# Patient Record
Sex: FEMALE | Race: NATIVE HAWAIIAN OR OTHER PACIFIC ISLANDER | Employment: UNEMPLOYED | URBAN - METROPOLITAN AREA
[De-identification: names, ages, dates, MRNs, and addresses within clinical notes are randomized per-mention and may not be internally consistent; named-entity substitution may affect disease eponyms.]

---

## 2023-02-11 ENCOUNTER — TRANSFERRED RECORDS (OUTPATIENT)
Dept: HEALTH INFORMATION MANAGEMENT | Facility: CLINIC | Age: 28
End: 2023-02-11
Payer: COMMERCIAL

## 2023-03-03 ENCOUNTER — TELEPHONE (OUTPATIENT)
Dept: ADDICTION MEDICINE | Facility: CLINIC | Age: 28
End: 2023-03-03

## 2023-03-03 ENCOUNTER — HOSPITAL ENCOUNTER (OUTPATIENT)
Dept: BEHAVIORAL HEALTH | Facility: CLINIC | Age: 28
Discharge: HOME | End: 2023-03-03
Attending: FAMILY MEDICINE
Payer: COMMERCIAL

## 2023-03-03 VITALS
SYSTOLIC BLOOD PRESSURE: 127 MMHG | DIASTOLIC BLOOD PRESSURE: 90 MMHG | TEMPERATURE: 97.7 F | OXYGEN SATURATION: 98 % | HEART RATE: 113 BPM

## 2023-03-03 LAB — SARS-COV-2 RNA RESP QL NAA+PROBE: NEGATIVE

## 2023-03-03 PROCEDURE — U0005 INFEC AGEN DETEC AMPLI PROBE: HCPCS | Performed by: FAMILY MEDICINE

## 2023-03-03 PROCEDURE — H2035 A/D TX PROGRAM, PER HOUR: HCPCS

## 2023-03-03 PROCEDURE — 1002N00001 HC LODGING PLUS FACILITY CHARGE ADULT

## 2023-03-03 RX ORDER — QUETIAPINE FUMARATE 50 MG/1
100 TABLET, FILM COATED ORAL 2 TIMES DAILY
Status: ON HOLD | COMMUNITY
Start: 2023-03-02 | End: 2023-04-10

## 2023-03-03 RX ORDER — GUAIFENESIN 100 MG/5ML
SOLUTION ORAL EVERY 4 HOURS PRN
COMMUNITY
End: 2023-03-16

## 2023-03-03 RX ORDER — QUETIAPINE 300 MG/1
600 TABLET, FILM COATED, EXTENDED RELEASE ORAL
Status: ON HOLD | COMMUNITY
Start: 2023-03-02 | End: 2023-04-10

## 2023-03-03 RX ORDER — LANOLIN ALCOHOL/MO/W.PET/CERES
3 CREAM (GRAM) TOPICAL
COMMUNITY
End: 2023-03-16

## 2023-03-03 RX ORDER — ONDANSETRON 4 MG/1
4 TABLET, ORALLY DISINTEGRATING ORAL
Status: ON HOLD | COMMUNITY
Start: 2022-11-03 | End: 2023-04-10

## 2023-03-03 RX ORDER — ALBUTEROL SULFATE 90 UG/1
AEROSOL, METERED RESPIRATORY (INHALATION)
Status: ON HOLD | COMMUNITY
Start: 2022-09-12 | End: 2023-04-10

## 2023-03-03 RX ORDER — AMOXICILLIN 250 MG
2 CAPSULE ORAL DAILY PRN
COMMUNITY
End: 2023-03-16

## 2023-03-03 RX ORDER — FLUTICASONE PROPIONATE 50 MCG
SPRAY, SUSPENSION (ML) NASAL
COMMUNITY
Start: 2023-02-16

## 2023-03-03 RX ORDER — CETIRIZINE HYDROCHLORIDE 10 MG/1
10 TABLET ORAL
COMMUNITY
Start: 2022-09-16 | End: 2023-09-16

## 2023-03-03 RX ORDER — LORATADINE 10 MG/1
10 TABLET ORAL DAILY PRN
COMMUNITY
End: 2023-03-03

## 2023-03-03 RX ORDER — IBUPROFEN 200 MG
400 TABLET ORAL EVERY 6 HOURS PRN
COMMUNITY
End: 2023-03-16

## 2023-03-03 RX ORDER — ACETAMINOPHEN 325 MG/1
325-650 TABLET ORAL EVERY 4 HOURS PRN
COMMUNITY
End: 2023-03-16

## 2023-03-03 RX ORDER — MAGNESIUM HYDROXIDE/ALUMINUM HYDROXICE/SIMETHICONE 120; 1200; 1200 MG/30ML; MG/30ML; MG/30ML
30 SUSPENSION ORAL EVERY 6 HOURS PRN
COMMUNITY
End: 2023-03-16

## 2023-03-03 ASSESSMENT — COLUMBIA-SUICIDE SEVERITY RATING SCALE - C-SSRS
4. HAVE YOU HAD THESE THOUGHTS AND HAD SOME INTENTION OF ACTING ON THEM?: NO
2. HAVE YOU ACTUALLY HAD ANY THOUGHTS OF KILLING YOURSELF LIFETIME?: NO
3. HAVE YOU BEEN THINKING ABOUT HOW YOU MIGHT KILL YOURSELF?: NO
1. IN THE PAST MONTH, HAVE YOU WISHED YOU WERE DEAD OR WISHED YOU COULD GO TO SLEEP AND NOT WAKE UP?: NO
1. IN THE PAST MONTH, HAVE YOU WISHED YOU WERE DEAD OR WISHED YOU COULD GO TO SLEEP AND NOT WAKE UP?: NO
6. HAVE YOU EVER DONE ANYTHING, STARTED TO DO ANYTHING, OR PREPARED TO DO ANYTHING TO END YOUR LIFE?: NO
2. HAVE YOU ACTUALLY HAD ANY THOUGHTS OF KILLING YOURSELF IN THE PAST MONTH?: NO
5. HAVE YOU STARTED TO WORK OUT OR WORKED OUT THE DETAILS OF HOW TO KILL YOURSELF? DO YOU INTEND TO CARRY OUT THIS PLAN?: NO

## 2023-03-03 ASSESSMENT — ANXIETY QUESTIONNAIRES
7. FEELING AFRAID AS IF SOMETHING AWFUL MIGHT HAPPEN: NOT AT ALL
1. FEELING NERVOUS, ANXIOUS, OR ON EDGE: NOT AT ALL
2. NOT BEING ABLE TO STOP OR CONTROL WORRYING: NOT AT ALL
GAD7 TOTAL SCORE: 2
4. TROUBLE RELAXING: MORE THAN HALF THE DAYS
5. BEING SO RESTLESS THAT IT IS HARD TO SIT STILL: NOT AT ALL
3. WORRYING TOO MUCH ABOUT DIFFERENT THINGS: NOT AT ALL
6. BECOMING EASILY ANNOYED OR IRRITABLE: NOT AT ALL
GAD7 TOTAL SCORE: 2

## 2023-03-03 ASSESSMENT — PATIENT HEALTH QUESTIONNAIRE - PHQ9: SUM OF ALL RESPONSES TO PHQ QUESTIONS 1-9: 2
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
## 2023-03-03 NOTE — PROGRESS NOTES
Progress Note    This patient had a Comprehensive Substance Abuse assessment on 02/11/2023 completed by Rey Gerardo.  This patient was seen for a face to face update of the Comprehensive Substance Abuse assessment on 3/3/2023 by SISSY Chandler.  OUTSIDE: A paper copy of the Comprehensive Substance Abuse assessment will be placed in the patient's paper chart until being scanned into the patient's electronic medical record in Epic under the Media tab.    Alcohol/Drug use since the last CD evaluation (include date of last use):     No additional substances use since the last CD evaluation     Please note any other clinical changes since the last CD evaluation (such as medication changes, additional legal charges, detoxification admissions, overdoses, etc.)     No significant changes since the last CD evaluation- Stayed in [facility] from 2/3/23-3/3/23.        ASAM Dimensions Original scores Current Scores   I.) Intoxication and Withdrawal: 1 0   II.) Biomedical:  1 1   III.) Emotional and Behavioral:  2 2   IV.) Readiness to Change:  2 2   V.) Relapse Potential: 3 3   VI.) Recovery Environmental: 4 4     Please list clinical justifications for the above ASAM score changes since the original comprehensive assessment:     The patient's score on Dimension I changed from a 1 to a 0.  The patient's withdrawal symptoms had been addressed by her physicians while she had been on [facility]..         Current JENNIFFER: Current UA:     0.000     Positive for THC and negative for all other screened drugs.       PHQ-9, SHANTELLE-7   PHQ-9 on 3/3/2023 SHANTELLE-7 on 3/3/2023   The patient's PHQ-9 score was 2 out of 27, indicating minimal depression.   The patient's SHANTELLE-7 score was 2 out of 21, indicating minimal anxiety.       Swain-Suicide Severity Rating Scale Reassessment   Have you ever wished you were dead or that you could go to sleep and not wake up?  Past Month:  No     Have you actually had any  thoughts of killing yourself?  Past Month:  No     Have you been thinking about how you might do this?     Past Month:  No   Lifetime:  No   Have you had these thoughts and had some intention of acting on them?     Past Month:  No   Lifetime:  No   Have you started to work out the details of how to kill yourself?   Past Month:  No   Lifetime:  No   Do you intend to carry out this plan?   No     When you have the thoughts how long do they last?  The patient denied having any suicidal thoughts within the past month.     Are there things - anyone or anything (i.e. family, Muslim, pain of death) that stopped you from wanting to die or acting on thoughts of suicide?  Does not apply       2008  [employer] [facility] [employer]  Used with permission by Kim Concepcion, PhD.       Guide to C-SSRS Risk Ratings   NO IDEATION:  with no active thoughts IDEATION: with a wish to die. IDEATION: with active thoughts. Risk Ratings   If Yes No No 0 - Very Low Risk   If NA Yes No 1 - Low Risk   If NA Yes Yes 2 - Low/moderate risk   IDEATION: associated thoughts of methods without intent or plan INTENT: Intent to follow through on suicide PLAN: Plan to follow through on suicide Risk Ratings cont...   If Yes No No 3 - Moderate Risk   If Yes Yes No 4 - High Risk   If Yes Yes Yes 5 - High Risk   The patient's ADDITIONAL RISK FACTORS and lack of PROTECTIVE FACTORS may increase their overall suicide risk ratings.     Additional Risk Factors:    Significant history of having untreated or poorly treated mental health symptoms     Significant history of untreated or poorly treated chronic pain issues     Tendency to be socially isolated and/or cut off from the support of others     Significant history of trauma and/or abuse issues     History of impulsive or aggressive behaviors   Protective Factors:    An absence of chronic health problems or stable and well treated chronic health issues     Having restricted access to highly  "lethal means of suicide     Risk Status   1. - Low Risk: Evaluation Counselors:  Document in Epic / SBAR to counselor \"Low Risk\".      Treatment Counselors:  Reassess upon admission as applicable, assess weekly in progress notes under Dimension 3 and summarize in Discharge / Treatment summary under Dimension 3.     Additional information to support suicide risk rating: There was no additional information to provide at this time.       "
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
## 2023-03-03 NOTE — TELEPHONE ENCOUNTER
Pt reports anaphylactic response to dairy injestion on multiple occassions, pt has never seen anyone to diagnose a dairy allergy and reports drinking milk at the facility she just transferred from without experiencing an anaphylactic  reaction. No epi pen is listed in her med list in care everywhere, only benadryl and lactose. Writer will request dairy free menu for pt and pt will be instructed to ask PCP at upcoming appt on 3/7/23 for benadryl and epipen.

## 2023-03-03 NOTE — PROGRESS NOTES
Name: Asha Arrieta  Date: 3/3/2023  Medical Record: [id number]    Envelope Number: [id number]    List of Contents (List each item separately in new row):   (1) [employer] Cell Phone   (1) Cell Phone      Admission:  I am responsible for any personal items that are not sent to the safe or pharmacy.  [facility] is not responsible for loss, theft or damage of any property in my possession.      Patient Signature:  ___________________________________________       Date/Time:__________________________    Staff Signature: __________________________________       Date/Time:__________________________    2nd Staff person, if patient is unable/unwilling to sign:      __________________________________________________________       Date/Time: __________________________      Discharge:  [facility] has returned all of my personal belongings:    Patient Signature: ________________________________________     Date/Time: ____________________________________    Staff Signature: ______________________________________     Date/Time:_____________________________________

## 2023-03-03 NOTE — PROGRESS NOTES
Substance Use History Update:           X = Primary Drug Used   Age of First Use Most Recent Pattern of Use and Duration   Need enough information to show pattern (both frequency and amounts) and to show tolerance for each chemical that has a diagnosis   Date of last use and time, if needed   Withdrawal Potential? Requiring special care Method of use  (oral, smoked, snort, IV, etc)      Alcohol     8 [facility]: don't use   2 x in last two years no oral      Marijuana/  Hashish   16 When I got out of [facility] I smoked 8 blunts 1/28/2023 no Smoke, oral      Cocaine/Crack     27 Tried once 1/25/2023 no snort      Meth/  Amphetamines   16 I relapsed 4 x    [facility]: I can make a 0.25 last all week 1/28/2023 no smoke      Heroin     18 4 x used in lifetime 3 years ago no Smoke, snort      Other Opiates/  Synthetics   27 Used a Percocet 1/28/2023 no smoke      Inhalants     17 1 whippet in lifetime Age 17 no inhale      Benzodiazepines     24 Was prescribed Klonopin but no longer prescribed.  Tried Xanax twice in lifetime 3 years ago no oral      Hallucinogens     No use          Barbiturates/  Sedatives/  Hypnotics No use          Over-the-Counter Drugs   No use          Other     No use          Nicotine     17 0.5 ppd 03/03/2023 yes smoke     Hiren Girard MA, [facility], LADC

## 2023-03-03 NOTE — PROGRESS NOTES
Name: Asha Arrieta  Date: 3/3/2023  Medical Record: [id number]    Envelope Number: [id number]    List of Contents (List each item separately in new row):   1 bottle Cefadroxil 500mg caps  1 bottle Quetiapine 300mg tabs    Admission:  I am responsible for any personal items that are not sent to the safe or pharmacy.  [facility] is not responsible for loss, theft or damage of any property in my possession.    Patient Signature:  ___________________________________________       Date/Time:__________________________    Staff Signature: __________________________________       Date/Time:__________________________    2nd Staff person, if patient is unable/unwilling to sign:      __________________________________________________________       Date/Time: __________________________    Discharge:  [facility] has returned all of my personal belongings:    Patient Signature: ________________________________________     Date/Time: ____________________________________    Staff Signature: ______________________________________     Date/Time:_____________________________________

## 2023-03-03 NOTE — PROGRESS NOTES
Initial Services Plan    Before your first treatment group, please do the following    Immediate health & safety concerns: Look for sober housing and a supportive social network.  Look for a support network (such as AA, NA, DBT group, a Gnosticist group, etc.)    Suggestions for client during the time between intake & completion of treatment plan:  Tour your treatment center (unit or outpatient clinic).  Introduce yourself to the treatment group.  Spend time getting to know your peers.  Complete the problem list for your treatment plan.  Start drug and alcohol use history.  Review your patient or client handbook.    Client issues to be addressed in the first treatment sessions:  Identify motivations(s) for coming to treatment, i.e. legal, family, job, self  Identify concerns about coming into treatment, i.e. fear of failing again, sharing a room in treatment  Identify concerns about going to group, i.e. fear of talking in group  Identify outside concerns that may interfere with treatment, i.e. bills not getting paid, homesick for children    Hiren Girard, [facility]  3/3/2023

## 2023-03-04 ENCOUNTER — HOSPITAL ENCOUNTER (OUTPATIENT)
Dept: BEHAVIORAL HEALTH | Facility: CLINIC | Age: 28
Discharge: HOME | End: 2023-03-04
Attending: FAMILY MEDICINE
Payer: COMMERCIAL

## 2023-03-04 PROCEDURE — H2035 A/D TX PROGRAM, PER HOUR: HCPCS | Mod: HQ

## 2023-03-04 PROCEDURE — 1002N00001 HC LODGING PLUS FACILITY CHARGE ADULT

## 2023-03-04 NOTE — GROUP NOTE
Group Therapy Documentation    PATIENT'S NAME: Asha Arrieta  MRN:   [id number]  :   1995  ACCT. NUMBER: [id number]  DATE OF SERVICE: 3/04/23  START TIME:  9:00 AM  END TIME: 11:00 AM  FACILITATOR(S): Jamel Romero LADC; Shoshana Sharma; Maddie Ring LADC  TOPIC: BEH Group Therapy  Number of patients attending the group:  10  Group Length:  2 Hours    Group Therapy Type: Recovery strategies    Summary of Group / Topics Discussed:    Recovery Principles and Relapse prevention      Group Attendance:  Attended group session    Patient's response to the group topic/interactions:  cooperative with task    Patient appeared to be Attentive and Engaged.        Client specific details:  The patient participated in the morning lecture on personalities and relapse.

## 2023-03-04 NOTE — PROGRESS NOTES
Comprehensive Summary Update and Review  Counselor met with patient on 3/4/2023 and reviewed the Comprehensive Assessment. Safety plan complete sent for scanning to pt's EHR.

## 2023-03-04 NOTE — PROGRESS NOTES
"Pt approached writer asking if she can keep her zofran on her person. Writer told her that zofran would need to remain in the med room and she can request it as needed. Pt said \"well I found some in my belongings and took one last night and this am.\"  Writer explained to pt that is not allowed at [facility] and if she finds any more medications in her belongings she needs to bring them to staff immediately. Pt verbalized understanding.   "
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
## 2023-03-04 NOTE — PROGRESS NOTES
"During 12:30 - 2:30 PM group lecture and skills workshop, patient required multiple reminders to keep her mask up. During one redirection, patient pushed back on staff, stating, \"I just drank something and that's why it's down,\" even though patient had had her mask off for approximately 10 minutes with no beverage prior to staff intervention. Patient also refused to participate in group activity despite encouragements and prompts from clinical staff and peers. Patient required redirection to turn around and face the lecturer as she kept her back to the lecturer, coloring instead of engaging in the activity. Patient pushed back on writer when redirected to face forward and complete the activity, stating, \"Look, I already did it, stop bugging me.\" Writer told patient that her disrespectful tone and behavior is not tolerated in this program. Patient did eventually turn around to face the lecturer, but continued to refuse to engage in the activity by not verbally participating or demonstrating any active listening. Writer to continue to monitor patient's behavior and consult with clinical staff regarding program compliance.  "

## 2023-03-04 NOTE — PROGRESS NOTES
"Writer was informed by another patient that patient had \"locked herself in the shower and was refusing to unlock the door.\" Writer informed psych associates on the floor who affirmed they were also aware of the situation. Psych associates also noted that patient spent an hour in the shower the previous evening, pushing back on coming out for programming but patient did not lock herself in at that time. Due to patient's behavior and previous encounter this morning with  nursing staff where she stated that she had medications on her person, writer requested to psych associates that patient submit a urinalysis within the next 24 hours. Writer to update the SBAR and follow up with patient and clinical staff regarding patient behavior.  "

## 2023-03-04 NOTE — GROUP NOTE
Group Therapy Documentation    PATIENT'S NAME: Asha Arrieta  MRN:   [id number]  :   1995  ACCT. NUMBER: [id number]  DATE OF SERVICE: 3/04/23  START TIME: 12:30 PM  END TIME:  2:30 PM  FACILITATOR(S): Maddie Ring LADC; Shoshana Sharma; Jamel Romero LADC  TOPIC: BEH Group Therapy  Number of patients attending the group:  24  Group Length:  2 Hours    Group Therapy Type: Recovery strategies    Summary of Group / Topics Discussed:    Sober coping skills, Disease of addiction, and Relapse prevention    Patients participated in a relapse prevention skills workshop, gaining knowledge on how their behaviors in active use can show up as thought patterns in early recovery. Patients learned how to identify their addictive thought patterns and practiced coping strategies to reframe these thoughts to avoid returning to use of substances. Each patient had an opportunity to process the information, ask questions of the facilitator, and provide constructive feedback to peers who shared.       Group Attendance:  Attended group session    Patient's response to the group topic/interactions:  did not share thoughts verbally and refused to participate.    Patient appeared to be Distracted and Disengaged     Client specific details:   Patient refused to participate in group activity and pushed back on clinical staff when redirected to turn around and face the lecturer. Patient eventually complied with facing forward, but also continued to refuse to participate when prompted and appeared disengaged throughout the session.

## 2023-03-05 ENCOUNTER — HOSPITAL ENCOUNTER (OUTPATIENT)
Dept: BEHAVIORAL HEALTH | Facility: CLINIC | Age: 28
Discharge: HOME | End: 2023-03-05
Attending: FAMILY MEDICINE
Payer: COMMERCIAL

## 2023-03-05 DIAGNOSIS — F19.20 CHEMICAL DEPENDENCY (H): ICD-10-CM

## 2023-03-05 DIAGNOSIS — F17.200 NICOTINE DEPENDENCE: Primary | ICD-10-CM

## 2023-03-05 LAB
FENTANYL UR QL: NORMAL
SARS-COV-2 RNA RESP QL NAA+PROBE: NEGATIVE

## 2023-03-05 PROCEDURE — 1002N00001 HC LODGING PLUS FACILITY CHARGE ADULT

## 2023-03-05 PROCEDURE — 80307 DRUG TEST PRSMV CHEM ANLYZR: CPT

## 2023-03-05 PROCEDURE — H2035 A/D TX PROGRAM, PER HOUR: HCPCS | Mod: HQ

## 2023-03-05 PROCEDURE — U0005 INFEC AGEN DETEC AMPLI PROBE: HCPCS

## 2023-03-05 NOTE — PROGRESS NOTES
Integrative Therapies: Essential Oils    Patient requesting essential oil inhaler to manage (Mood/Mental Health/Physical/Spiritual symptoms).     Discussed appropriate use of essential oil inhalers and instructed patient not to leave labeled product out on unit.     Patient was screened for kidney disease, asthma/reactive airway disease and rashes and wounds or 1st trimester of pregnancy    List Essential Oils requested by pt ashly Fischer decrave    Patient verbalized and demonstrated understanding of how to use essential oil inhaler correctly and will notify LP RN with any concerns or side effects. Patient agrees not to share their essential oil inhaler with other clients.  Continue to support the patient in safely utilizing integrative therapies as able to manage symptoms during treatment.

## 2023-03-06 ENCOUNTER — HOSPITAL ENCOUNTER (OUTPATIENT)
Dept: BEHAVIORAL HEALTH | Facility: CLINIC | Age: 28
Discharge: HOME | End: 2023-03-06
Attending: FAMILY MEDICINE
Payer: COMMERCIAL

## 2023-03-06 DIAGNOSIS — F19.20 CHEMICAL DEPENDENCY (H): ICD-10-CM

## 2023-03-06 PROCEDURE — 1002N00001 HC LODGING PLUS FACILITY CHARGE ADULT

## 2023-03-06 PROCEDURE — H2035 A/D TX PROGRAM, PER HOUR: HCPCS | Mod: HQ

## 2023-03-06 NOTE — GROUP NOTE
Group Therapy Documentation    PATIENT'S NAME: Asha Arrieta  MRN:   [id number]  :   1995  ACCT. NUMBER: [id number]  DATE OF SERVICE: 3/06/23  START TIME:  9:00 AM  END TIME: 11:00 AM  FACILITATOR(S): Lydia Gonzales LADC  TOPIC: BEH Group Therapy  Number of patients attending the group:  10  Group Length:  2 Hours    Group Therapy Type: Recovery strategies and Emotion processing    Summary of Group / Topics Discussed:    Recovery Principles, Sober coping skills, Mindfulness/Relaxation, and Emotions/expression  Patients started the group with a guided mediation and tapping exercise. Patient shared their present goals and future goals for sobriety. Two of the patients presented their assignments to the group and received appropriate feedbacks. Ended the session with the serenity prayer.       Group Attendance:  Attended group session    Patient's response to the group topic/interactions:  cooperative with task    Patient appeared to be Engaged.        Client specific details:  Asha participated during check-in and during the group activity. She shared appropriate feedback.

## 2023-03-06 NOTE — GROUP NOTE
Group Therapy Documentation    PATIENT'S NAME: Asha Arrieta  MRN:   [id number]  :   1995  ACCT. NUMBER: [id number]  DATE OF SERVICE: 3/06/23  START TIME: 12:30 PM  END TIME:  2:30 PM  FACILITATOR(S): Maddie Ring LADC  TOPIC: BEH Group Therapy  Number of patients attending the group:  10  Group Length:  2 Hours    Group Therapy Type: Recovery strategies    Summary of Group / Topics Discussed:    Recovery Principles, Co-occurring illnesses symptom management, and Coping/DBT informed care    Facilitator led group discussion on how core beliefs impact negative thought patterns and can lead to maladaptive behaviors including return to use of substances. Patients learned how to identify their unhealthy core beliefs through their negative self-statements and how their unhealthy beliefs can led to self-sabotaging behaviors. Patients then practiced DBT skills of checking the facts and examining the evidence to reframe their distorted thinking patterns to boost their confidence and self-esteem. Each patient had an opportunity to process and provide constructive feedback to peers who shared.      Group Attendance:  Attended group session    Patient's response to the group topic/interactions:  did not discuss personal experience, did not share thoughts verbally and verbalizations were off topic    Patient appeared to be Distracted and Passively engaged.        Client specific details:  Patient struggled to engage throughout group session. Patient did not participate with the group activity on identifying unhealthy core beliefs. Patient appeared to have trouble tracking the group discussion and topic, but also declined to verbally participate.

## 2023-03-06 NOTE — PROGRESS NOTES
Writer left voicemail message for patient's  Tyesha Early [phone] (HIWOT on file) regarding coordination of patient care.     UPDATE @ 9647: Writer received call back and spoke with  Tyesha Early regarding patient's appropriateness for [facility]. Case manger reported that she was led to believe by [facility] that [facility] addresses mental health in addition to chemical dependency. Writer verified that psychotherapy is offered at [facility] in addition to referrals to psychiatric services, but that we are not a psychiatric hospitalization treatment and that patient's unstable mental health cannot be [facility].     Writer also expressed disappointment and frustration with [facility] minimizing patient's mental health and failure to disclose patient's recent psychiatric hospitalizations. Writer requested that  look for placement for patient as [facility] cannot effectively treat her in her current mental state. Patient is unable to comprehend programming, follow program rules and expectations, and due to her unstable mental health, is unable to obtain any therapeutic benefit from chemical dependency counseling.  reports understanding and stated she is going to consult with clinical staff and follow up with writer regarding next steps.  also notes we can speak with [place] at [phone] who is patient's primary  at [facility]. Writer to update clinical staff.

## 2023-03-06 NOTE — PROGRESS NOTES
"Writer spoke with pt this am after pt mentioned she was taking zofran for \"morning sickness\" Writer noted no recent pregnancy test in pt's chart so writer asked pt about having a test completed. Pt appeared upset and said \"I am pregnant, but not telling anyone, it is nobodies business.\"  Writer discussed the importance of pt's medical team and LP team knowing if she was pregnant as this changed her care plan. Pt became more agreeable to the idea of having a pregnancy test done. Pt told writer that even if she is pregnant she doesn't think the test will show it, pt reports this has happened with previous pregnancies. Plan is for pt to discuss concerns with her PCP in her visit tomorrow     Pt also approached writer asking for a stronger nicotine gum order, pt was informed that she was given the highest strength possible. Pt then asked writer to order cinnamon gum because \" it was stronger\" writer explained that the flavor might be stronger but the mg amt was the same. Pt continued to disagree with writer. Writer explained that a new flavor of gum can be ordered after she is finished with her current supply. Pt agreed with this plan.        "

## 2023-03-07 ENCOUNTER — HOSPITAL ENCOUNTER (OUTPATIENT)
Dept: BEHAVIORAL HEALTH | Facility: CLINIC | Age: 28
Discharge: HOME | End: 2023-03-07
Attending: FAMILY MEDICINE
Payer: COMMERCIAL

## 2023-03-07 PROCEDURE — H2035 A/D TX PROGRAM, PER HOUR: HCPCS | Mod: HQ

## 2023-03-07 PROCEDURE — 1002N00001 HC LODGING PLUS FACILITY CHARGE ADULT

## 2023-03-07 NOTE — GROUP NOTE
Group Therapy Documentation    PATIENT'S NAME: Asha Arrieta  MRN:   [id number]  :   1995  ACCT. NUMBER: [id number]  DATE OF SERVICE: 3/07/23  START TIME: 12:30 PM  END TIME:  2:30 PM  FACILITATOR(S): Maddie Ring LADC  TOPIC: BEH Group Therapy  Number of patients attending the group:  10  Group Length:  2 Hours    Group Therapy Type: Recovery strategies    Summary of Group / Topics Discussed:    Recovery Principles, Spiritual Care, and Balanced lifestyle    Spiritual Group Therapy consisted of Spiritual Care and addressing Principles that are important in recovery, and wellness of self. Patients and facilitators (Jose Elias & SISSY)  reviewed topics related to sense of self, identity, and relations to others within spirituality/relision/nonspiritual concepts.       Group Attendance:  Attended group session    Patient's response to the group topic/interactions:  cooperative with task    Patient appeared to be Distracted and Passively engaged.        Client specific details:  Patient was present but did not verbally participate, appearing distracted and not tracking the discussion.

## 2023-03-07 NOTE — PROGRESS NOTES
"Pt came to  to start her 11am visit with her PCP that was set up as part of her discharge from [facility]. Pt was concerned about when she would eat lunch, writer explained that she could still eat lunch either after or during her appt. Pt repeatedly said she would \"just reschedule\" her appt. Writer reminded her that this was an important appt to address her multiple medical concerns. Pitor explained that staff RN would not be able to address these concerns without her medical providers orders and reccommendations.  Pt refused to continue the conversation and walked out of the RN office. Pitor has called pt's psychiatrist to coordinate an appt for this week and requested time during the meeting for RN and LP counselors to speak with him concerning the mentally health complexities of this pt. Waiting for call back from psychiatry office.   "

## 2023-03-07 NOTE — GROUP NOTE
Psychoeducation Group Documentation    PATIENT'S NAME: Asha Arrieta  MRN:   [id number]  :   1995  ACCT. NUMBER: [id number]  DATE OF SERVICE: 3/07/23  START TIME:  3:00 PM  END TIME:  4:00 PM  FACILITATOR(S): Lydia Gonzales LADC; Shoshana Sharma; Cecilia Salazar LADC  TOPIC: BEH Pyschoeducation  Number of patients attending the group:  12  Group Length:  1 Hours    Skills Group Therapy Type: Daily living/independence skills    Summary of Group / Topics Discussed:    Relationship/social skills  Speaker talked about remembering to be grateful daily and with sobriety. Patients did a group activity/worksheet in relation to the topic.          Group Attendance:  Attended group session    Patient's response to the group topic/interactions:  cooperative with task    Patient appeared to be Engaged.         Client specific details:  Asha was actively listening and participated in group.

## 2023-03-07 NOTE — PROGRESS NOTES
Pt met with LP RN and requested face masks with window which are available on the unit for [facility] staff or patients. LP RN provided pt with three, however as there is a shortage of these type of masks currently- RN  encouraged pt to utilize paper masks. Pt verbalizes understanding. Sakina Ralph, RN

## 2023-03-08 ENCOUNTER — HOSPITAL ENCOUNTER (OUTPATIENT)
Dept: BEHAVIORAL HEALTH | Facility: CLINIC | Age: 28
Discharge: HOME | End: 2023-03-08
Attending: FAMILY MEDICINE
Payer: COMMERCIAL

## 2023-03-08 PROCEDURE — H2035 A/D TX PROGRAM, PER HOUR: HCPCS | Mod: HQ

## 2023-03-08 PROCEDURE — 1002N00001 HC LODGING PLUS FACILITY CHARGE ADULT

## 2023-03-08 NOTE — GROUP NOTE
Group Therapy Documentation    PATIENT'S NAME: Asha Arrieta  MRN:   [id number]  :   1995  ACCT. NUMBER: [id number]  DATE OF SERVICE: 3/08/23  START TIME:  9:45 AM  END TIME: 11:00 AM  FACILITATOR(S): Prosper Mansfield LADC  TOPIC: BEH Group Therapy  Number of patients attending the group:  8  Group Length:  2 Hours    Group Therapy Type: Emotion processing and Health and wellbeing     Summary of Group / Topics Discussed:    Sober coping skills and Emotions/expression      Group Attendance:  Attended group session    Patient's response to the group topic/interactions:  listened actively    Patient appeared to be Engaged.        Client specific details: Walter, checked in to primary group by describing current emotions, thoughts, and spiritual wellbeing. Gave positive feedback to her peer that presented a assignment.Patient appeared uncertain and distracted during group.

## 2023-03-08 NOTE — PROGRESS NOTES
Writer spoke with patient's [facility] case management team regarding coordination of patient transfer.  indicated they have several facilities that could take patient, but it may take 1-2 weeks for the transfer.  requested + nursing staff and writer assist him with releases for these facilities, specifically, Shamika Jones who requests medical information on the patient. Writer to follow up with LP+ nursing staff and patient regarding coordination of ROIs.

## 2023-03-08 NOTE — GROUP NOTE
Group Therapy Documentation    PATIENT'S NAME: Asha Arrieta  MRN:   [id number]  :   1995  ACCT. NUMBER: [id number]  DATE OF SERVICE: 3/08/23  START TIME: 12:30 PM  END TIME:  2:30 PM  FACILITATOR(S): Shoshana Sharma  TOPIC: BEH Group Therapy  Number of patients attending the group:  8    Group Length:  2 Hours    Group Therapy Type: Recovery strategies, Emotion processing, and Daily living/independence skills    Summary of Group / Topics Discussed:    Recovery Principles, Sober coping skills, Relationship/socialization, and Relapse prevention      Group Attendance:  Attended group session    Patient's response to the group topic/interactions:  cooperative with task    Patient appeared to be Actively participating, Attentive and Engaged.        Client specific details:  Patient attended group session and was attentive and participative.

## 2023-03-08 NOTE — GROUP NOTE
Psychoeducation Group Documentation    PATIENT'S NAME: Asha Arrieta  MRN:   [id number]  :   1995  ACCT. NUMBER: [id number]  DATE OF SERVICE: 3/08/23  START TIME:  8:30 AM  END TIME:  9:30 AM  FACILITATOR(S): Nixon De Los Santos LADC; Arturo Lynn LADC  TOPIC: BEH Pyschoeducation  Number of patients attending the group:  9  Group Length:  1 Hours    Skills Group Therapy Type: Daily living/independence skills    Summary of Group / Topics Discussed:    Balanced lifestyle skills and Symptom management skills          Group Attendance:  Attended group session    Patient's response to the group topic/interactions:  cooperative with task and listened actively    Patient appeared to be Attentive.         Client specific details:  Asha gave appropriate feedback..

## 2023-03-09 ENCOUNTER — HOSPITAL ENCOUNTER (OUTPATIENT)
Dept: BEHAVIORAL HEALTH | Facility: CLINIC | Age: 28
Discharge: HOME | End: 2023-03-09
Attending: FAMILY MEDICINE
Payer: COMMERCIAL

## 2023-03-09 DIAGNOSIS — F19.20 CHEMICAL DEPENDENCY (H): ICD-10-CM

## 2023-03-09 LAB — SARS-COV-2 RNA RESP QL NAA+PROBE: NEGATIVE

## 2023-03-09 PROCEDURE — H2035 A/D TX PROGRAM, PER HOUR: HCPCS

## 2023-03-09 PROCEDURE — U0005 INFEC AGEN DETEC AMPLI PROBE: HCPCS

## 2023-03-09 PROCEDURE — H2035 A/D TX PROGRAM, PER HOUR: HCPCS | Mod: HQ

## 2023-03-09 PROCEDURE — 1002N00001 HC LODGING PLUS FACILITY CHARGE ADULT

## 2023-03-09 NOTE — PROGRESS NOTES
"Pt was getting ready to do her video visit and became frustrated that she could not log in. Writer was trying to help by asking questions about how she has logged in in the past (if she has used a phone or computer) pt said to writer \"no shit\" and continued  to swear in frustration and then stopped responding to the conversation that writer was trying to have with pt to help her log in. Pt spoke with her provider and then came to speak with writer. Pt acknowledged that she was frustrated by the situation and appeared calmer. Pt asked writer to call her provider and ask for an order to take her as needed \"aiggiation\" medication \"6 times a day\" she reported that people here are \"fucking stupid and keep asking the same things over and over.\" Writer spoke with RN at pt's clinic and was notified that both her seroquel doses were increased.   "

## 2023-03-09 NOTE — PROGRESS NOTES
"Pt met with LP RN in office to complete routine COVID test. Pt appears irritable and states that her mood is, \"Pissed off.\" Pt states, \"There's a disgusting ugly man here. There's a man coming in my room trying to get me sick.\" Pt refused to discuss further and states, \"I just want to get this test done and get out of here.\" COVID test completed. LP RN also reminded pt of her scheduled virtual appointment with her psychiatrist later today; pt reports that she is aware. Pt then left LP RN office. LP RN updated counselor of this conversation. Per counselor pt has hx of active delusions including perception that people are coming into her room. LP RN will continue to monitor and provide care coordination as needed. Sakina Ralph, BESS  "

## 2023-03-09 NOTE — PROGRESS NOTES
Name: Asha Arrieta  Date: 3/9/2023  Medical Record: [id number]    Envelope Number: [id number]    List of Contents (List each item separately in new row):   Quetiapine Fumarate XR 400MG   Quetiapine 50MG    Admission:  I am responsible for any personal items that are not sent to the safe or pharmacy.  [facility] is not responsible for loss, theft or damage of any property in my possession.  Patient Signature:  ___________________________________________       Date/Time:__________________________    Staff Signature: __________________________________       Date/Time:__________________________    2nd Staff person, if patient is unable/unwilling to sign:      __________________________________________________________       Date/Time: __________________________  Discharge:  [facility] has returned all of my personal belongings:    Patient Signature: ________________________________________     Date/Time: ____________________________________    Staff Signature: ______________________________________     Date/Time:_____________________________________

## 2023-03-09 NOTE — PROGRESS NOTES
"Writer met with patient to review initial treatment plan.      While communicating regarding patients MICD Commitment and maintaining medications, patient appeared to get more agitated and was starting to raise her voice. Patient started making statements that did not make sense and appearing very defensive, \" why do you people keep coming at me about getting on more medications?\" Writer responded, \" I am unsure who you are referring to, I am unaware of anyone asking about medications outside of nursing staff?\" Patient continued avoidence of answering the questions and responding aggressively \"you don't need to know that, who do you think you are?\" Writer asked, how is Seroquel helpful for you? Does it help with the hallucinations you reported?\"   Patient, \" I don't need to talk about what I have or dont have with you or anyone. I don't have any of those things!\"    Writer inquired about patients MH dx, and areas she feels would be help to explore in treatment. \"My anxiety, depression, and anger.\"     Writer clarified being staff, and patients primary counselor, as the need for asking detailed questions, in hopes to help guide her while in treatment.  Patient responded abruptly, \" I don't know you, we have never talked 1:1 before today.\"   Writer again clarified speaking with patient previously, and introduced myself, and clarified a few program policies. Patient just stared at writer, with no response.     Writer then proceeded to address concerns with patients behavior towards staff on a few occasions over the past few days. Writer expressed concern with patients disrespectful behavior towards nursing staff and referring to staff/patients as \" fucking women/these bitches.\"     Patient became defensive , \" why do all these bitches keep trying to talk to me and ask me about my kids, and talking behind my back and saying shit about me.\"     Patient stated she feels triggered by anyone asking about her kids, and it " "continues to happen 3 times a day. Writer stated letting other staff know that her children are a trigger for her, and advised that it was okay for patient to respectfully verbalize not wanting to discuss the topic of children  .   Writer validating feeling triggered by specific topics, and urged patient to verbalize her feelings verses engaging disrespectfully.   Patient shook her head, stood up and was standing near/over writier and stated, \" FINE! I will just go groups and homework, and not talk to anyone and make friends.\"     Writer stated, \" No, that's not what I am trying to convey.  I think being here is a good time to connect with other sober women, and relate and work towards sharing experiences and work on your program, not isolate, and avoid connections. All of these things are your choices.\"     - - - -  - - - -   Throughout conversation patient appeared agitated, swearing and using profanity to describe staff/peers. Patient continues to make comments that do not make sense and appear to be of a delusion or distorted thinking pattern. Patient was unable to track conversation for it to be beneficial, and in attempt to respond back, information was already noticeably perceived and twisted into distortion and paranoia.   Writer explained the information that was initially given to [facility] for patient admission was inaccurate to patients actual current status , and her CM is working to find an appropriate placement for patient, due to her risk ratings and level of intensity needs.   Patients behavior with writer in this session, are cause for concern and awareness.    Patient was advised if her disrespectful behavior continues, she will be discharged from the program.             "

## 2023-03-09 NOTE — GROUP NOTE
Group Therapy Documentation    PATIENT'S NAME: Asha Arrieta  MRN:   [id number]  :   1995  ACCT. NUMBER: [id number]  DATE OF SERVICE: 3/09/23  START TIME:  9:00 AM  END TIME: 11:00 AM  FACILITATOR(S): Lydia Gonzales LADC  TOPIC: BEH Group Therapy  Number of patients attending the group:  7  Group Length:  2 Hours    Group Therapy Type: Recovery strategies    Summary of Group / Topics Discussed:    Patients started group with a guided mediation. Patients then checked in and introduce themselves to  their new peer. Two peers presented their assignments to the group and had a discussion. Patients in the second half of the session read the reading for the day and shared their thoughts. Patients then participated in a worksheet activity and shortly discussed F.A.S.T. skill.      Group Attendance:  Attended group session    Patient's response to the group topic/interactions:  cooperative with task    Patient appeared to be Distracted.        Client specific details:  Patient appeared to be in a distracted mood when coming in to the group room. Patient was not disruptive but was non-participative during the first half of session, latter participated in the group activity worksheet.

## 2023-03-09 NOTE — GROUP NOTE
"Group Therapy Documentation    PATIENT'S NAME: Asha Arrieta  MRN:   [id number]  :   1995  ACCT. NUMBER: [id number]  DATE OF SERVICE: 3/09/23  START TIME: 12:30 PM  END TIME:  2:30 PM  FACILITATOR(S): Maddie Ring LADC  TOPIC: BEH Group Therapy  Number of patients attending the group:  9  Group Length:  2 Hours    Group Therapy Type: Recovery strategies and Emotion processing    Summary of Group / Topics Discussed:    Recovery Principles, Sober coping skills, and Emotions/expression    Facilitator led process group where patients discussed feelings and concerns about maintaining abstinence following treatment at [facility]. Several patients processed emotions of fear and anxiety about relapse. Patients read the Author Unknown piece, \"Personal Bill of Rights\" and discussed what rights they struggle with and how they can empower themselves to make healthy choices in recovery. Each patient had an opportunity to process and provide constructive feedback to peers who shared.      Group Attendance:  Excused from group session    Patient's response to the group topic/interactions:    Patient was absent due to approved psychiatry appointment.      "

## 2023-03-09 NOTE — PROGRESS NOTES
Acknowledgement of Current Treatment Plan - Initial Treatment Plan     INITIAL TREATMENT PLAN:     1. I have participated in creating my treatment plan with my therapist / counselor on __________.     I agree with the plan as it is written in the electronic health record.    Name Signature/Date   Patient     Name of Therapist / Counselor   Signature/Date   Counselor      2. I have completed and reviewed my Safety Plan with my counselor and signed this on _________. I have been given the hard copy of this plan.    Patient signature/date:      _____________________________________________________________________________    3. Last Use Date: __________    Patient signature/date:     _____________________________________________________________________________

## 2023-03-09 NOTE — GROUP NOTE
Psychoeducation Group Documentation    PATIENT'S NAME: Asha Arrieta  MRN:   [id number]  :   1995  ACCT. NUMBER: [id number]  DATE OF SERVICE: 3/09/23  START TIME:  3:00 PM  END TIME:  4:00 PM  FACILITATOR(S): Jamel Romero LADC; Shoshana Sharma; Lydia Gonzales LADC  TOPIC: BEH Pyschoeducation  Number of patients attending the group:  7  Group Length:  1 Hours    Skills Group Therapy Type: Medication education    Summary of Group / Topics Discussed:    Medication management skills          Group Attendance:  Attended group session    Patient's response to the group topic/interactions:  cooperative with task    Patient appeared to be Attentive and Engaged.         Client specific details:  The patient participated in the afternoon skills group on Medications.

## 2023-03-10 ENCOUNTER — HOSPITAL ENCOUNTER (OUTPATIENT)
Dept: BEHAVIORAL HEALTH | Facility: CLINIC | Age: 28
Discharge: HOME | End: 2023-03-10
Attending: FAMILY MEDICINE
Payer: COMMERCIAL

## 2023-03-10 PROCEDURE — H2035 A/D TX PROGRAM, PER HOUR: HCPCS | Mod: HQ

## 2023-03-10 PROCEDURE — 1002N00001 HC LODGING PLUS FACILITY CHARGE ADULT

## 2023-03-10 ASSESSMENT — PATIENT HEALTH QUESTIONNAIRE - PHQ9: SUM OF ALL RESPONSES TO PHQ QUESTIONS 1-9: 2
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
## 2023-03-10 NOTE — GROUP NOTE
Group Therapy Documentation    PATIENT'S NAME: Asha Arrieta  MRN:   [id number]  :   1995  ACCT. NUMBER: [id number]  DATE OF SERVICE: 3/10/23  START TIME:  9:00 AM  END TIME: 11:00 AM  FACILITATOR(S): Lydia Gonzales LADC  TOPIC: BEH Group Therapy  Number of patients attending the group:  8  Group Length:  2 Hours    Group Therapy Type: Recovery strategies and Daily living/independence skills    Summary of Group / Topics Discussed:    Mindfulness/Relaxation, Self-care activities, and Grounding techniques    Patients practiced mindfulness through a guided meditation, checked in with the group and had a discussion on the daily reading. The group went outside and walked around the campus as a change of scenery and be with nature. Patients discussed what they appreciated during the walk. Patients then did an individual activity of writing a letter to themselves- hopes and goals for their sobriety journey; and to be opened after thirty days or when they complete treatment  Group Attendance:  Attended group session    Patient's response to the group topic/interactions:  cooperative with task, expressed reluctance to alter behavior, left the group on several occasions, refused to comply with staff direction and verbalizations were off topic    Patient appeared to be Distracted.        Client specific details:  Asha appeared distracted and often lose focus so facilitators have to bring bring her to attention. Patient was disruptive when she left group to take her medications. Patient steadied as the group goes on. Patient participated in the group activity.

## 2023-03-10 NOTE — GROUP NOTE
Group Therapy Documentation    PATIENT'S NAME: Asha Arrieta  MRN:   [id number]  :   1995  ACCT. NUMBER: [id number]  DATE OF SERVICE: 3/10/23  START TIME: 12:30 PM  END TIME:  2:30 PM  FACILITATOR(S): Lashanda Paz LADC; Maddie Ring LADC  TOPIC: BEH Group Therapy  Number of patients attending the group:  8  Group Length:  2 Hours    Group Therapy Type: Recovery strategies    Summary of Group / Topics Discussed:    Recovery Principles      Patients viewed an addiction/mental health based film. This film addressed: addiction as a disease, relationships and addiction, engagement in AA, and evaluating priorities when getting sober.   Patients engaged in a thorough discussion about the film, and shared personal experiences, and how the film helped them process their own life events.           Group Attendance:  Attended group session    Patient's response to the group topic/interactions:  cooperative with task    Patient appeared to be Actively participating.        Client specific details:  Asha attended PM group therapy. Patient engaged in discussion and participated in sharing feedback to his peers.

## 2023-03-10 NOTE — PROGRESS NOTES
"Late entry 3/9/23 1600  Pt asked to speak with writer privately, pt expressed frustration about the conversation she had just had with her primary counselor (pt was not able to identify Blanka and said to writer \"she is supposedly my primary counselor\"). Pt would apologize and then get upset again repeatedly. Pt reported that she didn't mean to be disrespectful but then would say negative comments about her peers and staff. Pt switched topic of conversation frequently and brought up concerns about people not believing her kids were her kids because they were \"white and famous.\" Pt also started talking about meeting her bio mom. Pt abruptly changed topics and said she needed to go outside to smoke and left the office. Pt and writer spoke later in the evening and pt was apologetic for her behavior and said she hoped the increase in her Seroquel would help her \"aggitation.\" Writer encouraged pt to come talk with nursing if she had concerns or side effects from her med change and that we can help coordinate a conversation with her psychiatric if needed  "

## 2023-03-11 ENCOUNTER — HOSPITAL ENCOUNTER (OUTPATIENT)
Dept: BEHAVIORAL HEALTH | Facility: CLINIC | Age: 28
Discharge: HOME | End: 2023-03-11
Attending: FAMILY MEDICINE
Payer: COMMERCIAL

## 2023-03-11 PROCEDURE — H2035 A/D TX PROGRAM, PER HOUR: HCPCS | Mod: HQ

## 2023-03-11 PROCEDURE — 1002N00001 HC LODGING PLUS FACILITY CHARGE ADULT

## 2023-03-11 NOTE — GROUP NOTE
"Group Therapy Documentation    PATIENT'S NAME: Asha Arrieta  MRN:   [id number]  :   1995  ACCT. NUMBER: [id number]  DATE OF SERVICE: 3/11/23  START TIME:  9:00 AM  END TIME: 11:00 AM  FACILITATOR(S): Lashanda Paz LADC  TOPIC: BEH Group Therapy  Number of patients attending the group:  8  Group Length:  2 Hours    Group Therapy Type: Recovery strategies    Summary of Group / Topics Discussed:    Recovery Principles    Primary Counselor Paxton De Los Santos gave Psychoeducational Lecture on Relapse Prevention Skills/Essential Parts of  [facility].   Lecture included: Breakdown of \"Substance Use Disorder, Reasoning/Effectiveness behind the 12 Steps of AA, Willingness to Surrender, Continuing to Progress.Heal In Recovery, Sponsorship, Cross Addiction, Honesty with Self/Others, and Living Life on Life's Terms in Recovery.\"       Group Attendance:  Attended group session    Patient's response to the group topic/interactions:  cooperative with task    Patient appeared to be Actively participating.        Client specific details:  Asha  attended  Psychoeducational Lecture on Relapse Prevention Skills. Patient participated and remained engaged throughout group.         "

## 2023-03-11 NOTE — GROUP NOTE
Group Therapy Documentation    PATIENT'S NAME: Asha Arrieta  MRN:   [id number]  :   1995  ACCT. NUMBER: [id number]  DATE OF SERVICE: 3/11/23  START TIME: 12:30 PM  END TIME:  2:30 PM  FACILITATOR(S): Nixon De Los Santos LADC; Lashanda Paz LADC; Cecilia Salazar LADC  TOPIC: BEH Group Therapy  Number of patients attending the group:  21  Group Length:  2 Hours    Group Therapy Type: Recovery strategies    Summary of Group / Topics Discussed:    Relationship/socialization and Relapse prevention    Group Attendance:  Attended group session    Patient's response to the group topic/interactions:  cooperative with task and expressed understanding of topic    Patient appeared to be Actively participating, Attentive and Engaged.        Client specific details: Pt listened to a presentation on communication styles/skills and took part in an activity practicing them.

## 2023-03-12 ENCOUNTER — HOSPITAL ENCOUNTER (OUTPATIENT)
Dept: BEHAVIORAL HEALTH | Facility: CLINIC | Age: 28
Discharge: HOME | End: 2023-03-12
Attending: FAMILY MEDICINE
Payer: COMMERCIAL

## 2023-03-12 PROCEDURE — 1002N00001 HC LODGING PLUS FACILITY CHARGE ADULT

## 2023-03-12 PROCEDURE — H2035 A/D TX PROGRAM, PER HOUR: HCPCS | Mod: HQ

## 2023-03-12 NOTE — PROGRESS NOTES
After writer lecture on TB and STD, pt s are given the option to fill out Discussion Sheet    Pt answered the following questions in written form:    Q - What was the most important thing that you learned from this lecture today?    Pt answer:  Tuberculosis, I don t have any and they are trying to give them to me!     Q - What suggestions and changes are you planning on adding to your daily/weekly routine?    Pt answer:  No equality for when multiple people are inside one person especially when they are clean bodies inside unclean bodies     Pt attentive during the lecture.  Pt demonstrates being polite and engaged in LP programming.  Does not appear to be at risk to herself or anyone else. Pt is taking Quetiapine as prescribed below (both PRN and scheduled)    Writer forwarded the above response to pt primary counselors and alerted/ LP weekend staff counselor Cecilia of pt responses.  LP Unit Coordinator notified as well.    Last Assessment/Plan from [place] Psychiatrist Jatin May from [facility] dated 3/9/2023:  Patient has a history of psychosis in the context of possible bipolar versus substance-induced psychosis versus schizoaffective disorder. It seems more likely that the diagnosis is clarifying to schizoaffective disorder given continued delusions and paranoia even when she is not currently using drugs. We will increase quetiapine to 600 mg at bedtime and increase her daytime doses to 100 mg twice daily as needed. Visit diagnosis Schizoaffective disorder bipolar type.       Will continue to support pt appropriately while at LP

## 2023-03-12 NOTE — GROUP NOTE
Psychoeducation Group Documentation    PATIENT'S NAME: Asha Arrieta  MRN:   [id number]  :   1995  ACCT. NUMBER: [id number]  DATE OF SERVICE: 3/12/23  START TIME: 12:30 PM  END TIME:  1:30 PM  FACILITATOR(S): Nixon De Los Santos LADC; Cecilia Saalzar LADC  TOPIC: BEH Pyschoeducation  Number of patients attending the group:  21  Group Length:  1 Hours    Skills Group Therapy Type: Relationship skills development    Summary of Group / Topics Discussed:    Relationship/social skills          Group Attendance:  Attended group session    Patient's response to the group topic/interactions:  cooperative with task    Patient appeared to be Attentive.         Client specific details:  Asha gave appropriate feedback..

## 2023-03-12 NOTE — GROUP NOTE
Psychoeducation Group Documentation    PATIENT'S NAME: Asha Arrieta  MRN:   [id number]  :   1995  ACCT. NUMBER: [id number]  DATE OF SERVICE: 3/12/23  START TIME:  8:40 AM  END TIME: 10:30 AM  FACILITATOR(S): Nixon De Los Santos LADC; Cecilia Salazar LADC; Li Haynes RN  TOPIC: BEH Pyschoeducation  Number of patients attending the group:  21  Group Length:  2 Hours    Skills Group Therapy Type: Healthy behaviors development    Summary of Group / Topics Discussed:    Relationship/social skills and Balanced lifestyle skills          Group Attendance:  Attended group session    Patient's response to the group topic/interactions:  cooperative with task    Patient appeared to be Attentive.         Client specific details:  Asha gave appropriate feedback..

## 2023-03-13 ENCOUNTER — HOSPITAL ENCOUNTER (OUTPATIENT)
Dept: BEHAVIORAL HEALTH | Facility: CLINIC | Age: 28
Discharge: HOME | End: 2023-03-13
Attending: FAMILY MEDICINE
Payer: COMMERCIAL

## 2023-03-13 PROCEDURE — H2035 A/D TX PROGRAM, PER HOUR: HCPCS | Mod: HQ

## 2023-03-13 PROCEDURE — 1002N00001 HC LODGING PLUS FACILITY CHARGE ADULT

## 2023-03-13 NOTE — PROGRESS NOTES
Writer faxed notes from LP nurses and counselor to outside psychiatrist Dr May, wanted to make sure he is aware of pt's delusions and paranoia.  Pt was given written copy of her weekly psychiatry appts

## 2023-03-13 NOTE — PROGRESS NOTES
"Writer spoke with patient's [facility] ([phone]) regarding coordination of patient transfer to a more appropriate treatment facility to manage her mental health. Case manger reports that they have not [facility]. Writer reported patient behavior concerns from the weekend, including refusal to follow masking rules, pushing back on staff when they redirect her to follow program rules, and continued delusional statements of \"people trying to give me tuberculosis/make me sick; b*tches keep asking about all of my kids; people trying to take my kids; people trying to take my baby [fetus]/I am pregnant.\" Writer stated that a transition plan needs to be in place because there is concern patient is going to be discharged for behavior without a transition in place.  stated he would consult with clinical team regarding crisis housing in the [facility] in order to expedite patient's admittance to an IRTS facility. Writer requested a transfer be arranged by the end of this week.  to follow up with writer either today or tomorrow regarding a transition plan. Writer to meet with clinical staff to update and discuss transition planning.  "

## 2023-03-13 NOTE — GROUP NOTE
"Group Therapy Documentation    PATIENT'S NAME: Asha Arrieta  MRN:   [id number]  :   1995  ACCT. NUMBER: [id number]  DATE OF SERVICE: 3/13/23  START TIME:  9:00 AM  END TIME: 11:00 AM  FACILITATOR(S): Maddie Ring LADC  TOPIC: BEH Group Therapy  Number of patients attending the group:  6  Group Length:  2 Hours    Group Therapy Type: Recovery strategies    Summary of Group / Topics Discussed:    Recovery Principles, Sober coping skills, and Self-care activities    Patients checked in to primary group, by describing current mood, stating an affirmation and a gratitude. Several patients processed current challenges they are experiencing. Facilitator led group discussion on the importance of self-care in maintaining abstinence from substances. Patients gained knowledge on the \"3 pillars of self-care:\" nutrition; exercise; sleep. Facilitator also discussed a \"4th pillar:\" recovery. Patient discussed self-care practices that support recovery, including attending sober support meetings, accessing mental health services, and joining sober fellowship and recovery activities. Each patient had an opportunity to process and provide constructive feedback to peers who shared.      Group Attendance:  Attended group session    Patient's response to the group topic/interactions:  cooperative with task    Patient appeared to be Inattentive and Passively engaged.        Client specific details:  Patient appeared to struggle with following the group discussion on self-care, making comments on having multiple children and getting them ready for school in the morning, when EHR indicates she has had only one child and does not have custody of that child. Patient followed redirection well, but when prompted by facilitator, responded with feedback that was not related to group discussion.      "

## 2023-03-13 NOTE — PROGRESS NOTES
"[facility] Weekly Treatment Plan Review      Weekly Treatment Plan Review  Date Span: 3/6/2023 to 3/12/2023  Treatment Plan initiated on: 3/9/2023.    Dimension1: Acute Intoxication/Withdrawal Potential -   Previous Dimension Ratin  Current Dimension Ratin  Date of Last Use: 23 (F15.20) ,23 (F12.10)  Any reports of withdrawal symptoms - No      Dimension 2: Biomedical Conditions & Complications -   Previous Dimension Ratin  Current Dimension Ratin  Medical Concerns:  Yes- did not specify  Current Medications & Medication Changes:  Current Outpatient Medications   Medication     acetaminophen (TYLENOL) 325 MG tablet     albuterol (PROAIR HFA/PROVENTIL HFA/VENTOLIN HFA) 108 (90 Base) MCG/ACT inhaler     alum & mag hydroxide-simethicone (MAALOX) 200-200-20 MG/5ML SUSP suspension     benzocaine-menthol (CEPACOL) 15-3.6 MG lozenge     cetirizine (ZYRTEC) 10 MG tablet     fluticasone (FLONASE) 50 MCG/ACT nasal spray     guaiFENesin 200 MG/10ML LIQD     ibuprofen (ADVIL/MOTRIN) 200 MG tablet     melatonin 3 MG tablet     nicotine (NICORETTE) 4 MG gum     omeprazole (PRILOSEC) 20 MG DR capsule     ondansetron (ZOFRAN ODT) 4 MG ODT tab     QUEtiapine (SEROQUEL) 50 MG tablet     QUEtiapine ER (SEROQUEL XR) 300 MG 24 hr tablet     senna-docusate (SENOKOT-S/PERICOLACE) 8.6-50 MG tablet     No current facility-administered medications for this encounter.     Facility-Administered Medications Ordered in Other Encounters   Medication     Self Administer Medications: Behavioral Services     Medication Prescriber:  Dr. May (see patient chart for more information)  Taking meds as prescribed? Yes  Medication side effects or concerns:  \"might need adderall or methadone\"  Outside medical appointments this week (list provider and reason for visit):  None reported    Narrative: Pt reports medical problems this past week but did not specify. Pt seems fully functioning and seeks medical attention as " "needed. Pt's temperature and oxygen level are measured as needed in accordance with COVID-19 precautions. She attended lecture given by LP nurse on 3/12/2023 on Hep C/Tuberculosis.       Dimension 3: Emotional/Behavioral Conditions & Complications -   Previous Dimension Ratin  Current Dimension Ratin  PHQ2:   PHQ-2 (  [employer]) 3/13/2023 3/10/2023   Q1: Little interest or pleasure in doing things 0 1   Q2: Feeling down, depressed or hopeless 1 1   PHQ-2 Score 1 2      GAD2:   SHANTELLE-2 3/10/2023 3/13/2023   Feeling nervous, anxious, or on edge 1 1   Not being able to stop or control worrying 1 0   SHANTELLE-2 Total Score 2 1     Mental health diagnosis: \"Patient reports historical mental health diagnoses of Schizoaffective Disorder, and Bipolar I, but also verbalized she does not agree with her mental health diagnoses\"   Date of last SIB:  patient reported when she was in middle school (cutting)  Date of  last SI:  denies  Date of last HI: denies  Behavioral Targets:  emotional, behavioral and cognitive stabilization, increase awareness on coping skills/refusal skills to relapse,   Risk factors:  Inconsistent mental health history diagnosis, increase mental health symptoms: impulsivity, paranoia and delusions  Protective factors:  spirituality, safe and stable environment, abstinence from substances, adherence with prescribed medication and structured day  Current MH Assignments:  none reported     Narrative: Current Mental Health symptoms include: depression. See below for suicide risk assessment. Pt does not endorse thoughts of self-harm or suicide ideation at this time. Pt's current CGI is 5/5.  They report that their stress level has increased this week; coping skills to manage stress used were \"music, coloring, movies\". Pt reported that their mood has significantly changed this past week and they are: \"aggitated\"    [place] Suicide Severity Rating Scale (Short Version)  [place] Suicide Severity Rating " "(Short Version) 3/3/2023 3/10/2023   Q1 Wished to be Dead (Past Month) no no   Q2 Suicidal Thoughts (Past Month) no no   Q3 Suicidal Thought Method no no   Q4 Suicidal Intent without Specific Plan no no   Q5 Suicide Intent with Specific Plan no no   Q6 Suicide Behavior (Lifetime) no no   Level of Risk per Screen low risk low risk         Dimension 4: Treatment Acceptance / Resistance -   Previous Dimension Ratin  Current Dimension Ratin  DARIN Diagnosis:  305.20 (F12.10) Cannabis Use Disorder Mild  .  Stimulant Use Disorder:  In a controlled environment, Specify current severity:  Severe  304.40 (F15.20) Severe, Amphetamine type substance  Commitment to tx process/Stage of change- contemplative  DARIN assignments - none reported    Narrative - Pt reports their motivation this week is \"todd\". Pt reports that their aftercare plans include \"not sure\". She report that they have gotten along \"not really but yes also\" with peers and staff this week. She engaged in the following recreational activities: none reported but has been seen coloring and watching tv with same gender peers      Dimension 5: Relapse / Continued Problem Potential -   Previous Dimension Rating:  3  Current Dimension Rating:  3  Relapses this week - None  Urges to use - YES, List \"asking same questions\"  UA results - negative for all on 3/8/2023    Narrative- Pt reports cravings this past week. Pt reports craving was not rated.She reported experiencing triggers to use due to people asking the same questions but used the following coping skill(s): \"tobacco\". Pt participated in the spirituality group, facilitated by Rosalinda Hercules and  counseling staff was present during group. She participated in weekend workshop on relapse prevention and completed all activities.     Dimension 6: Recovery Environment -   Previous Dimension Ratin  Current Dimension Ratin  Family Involvement - Yes  Summarize attendance at family groups and family " sessions - NA  Family supportive of treatment?  Yes  Recreational activities - none reported, but used coping skills: music, coloring and movies    Narrative - Pt is spending free time with same-gender peers and attending at least 3 virtual 12-step meetings weekly while in LP. She participated in weekend workshop on relationships and completed all activities.    Progress made on transition planning goals: initial progress not. Safety plan and client goals completed. Treatment plan completed.    Justification for Continued Treatment at this Level of Care: inefficient comprehensive assessment history from previous treatment, increase signs of mental health symptoms which includes paranoia and delusions and may have exacerbated to dual issues. Continued collaboration with primary care provider/therapist and  from [facility].  Treatment coordination activities this week:  coordination with  and coordination with outside therapist  Need for peer recovery support referral? No    Discharge Planning:  Target Discharge Date/Timeframe:  TBD  Med Mgmt Provider/Appt:  TBD  Ind therapy Provider/Appt:  TBD  Family therapy Provider/Appt:  TBD  Other referrals:  none at this time.    Has vulnerable adult status changed? No    Interdisciplinary Clinical Supervision including: SISSY, Mental health professional, Medical provider, RN and     Are Treatment Plan goals/objectives effective? Yes  *If no, list changes to treatment plan:    Are the current goals meeting client's needs? Yes  *If no, list the changes to treatment plan.    Client Input / Response: Patient responded to completing weekly progress sheet.      *Client agrees with any changes to the treatment plan: N/A  *Client received copy of changes: N/A  *Client is aware of right to access a treatment plan review: Yes     SISSY Lubin on 3/13/2023 at 4:05 PM

## 2023-03-13 NOTE — GROUP NOTE
"Group Therapy Documentation    PATIENT'S NAME: Asha Arrieta  MRN:   [id number]  :   1995  ACCT. NUMBER: [id number]  DATE OF SERVICE: 3/13/23  START TIME: 12:30 PM  END TIME:  2:30 PM  FACILITATOR(S): Lydia Gonzales LADC  TOPIC: BEH Group Therapy  Number of patients attending the group:  6  Group Length:  2 Hours    Group Therapy Type: Recovery strategies    Summary of Group / Topics Discussed:    Recovery Principles  Group started with a motivational speech video and discussed thoughts about it.  Each group member define what motivation is and what are their motivations, discussed as to what stage of change they are in, set their goals for the week, month, year and 5 years. Ended the session with a LOVELY talk title \"puzzle of motivation by Kris Scott\", and serenity prayer.      Group Attendance:  Attended group session    Patient's response to the group topic/interactions:  did not discuss personal experience, did not share thoughts verbally, expressed reluctance to alter behavior, left the group on several occasions and refused to participate.    Patient appeared to be Passively engaged.        Client specific details:  Patient refused to share during discussion. Patient was late in group which has been a trend and may become disruptive to peers. Patient completed worksheet.      "

## 2023-03-14 ENCOUNTER — HOSPITAL ENCOUNTER (OUTPATIENT)
Dept: BEHAVIORAL HEALTH | Facility: CLINIC | Age: 28
Discharge: HOME | End: 2023-03-14
Attending: FAMILY MEDICINE
Payer: COMMERCIAL

## 2023-03-14 PROCEDURE — 1002N00001 HC LODGING PLUS FACILITY CHARGE ADULT

## 2023-03-14 PROCEDURE — H2035 A/D TX PROGRAM, PER HOUR: HCPCS

## 2023-03-14 PROCEDURE — H2035 A/D TX PROGRAM, PER HOUR: HCPCS | Mod: HQ

## 2023-03-14 NOTE — PROGRESS NOTES
Writer emailed signed ROIs and current medication list to patient's [facility] ([email or web address]) for coordination of patient care.

## 2023-03-14 NOTE — GROUP NOTE
Group Therapy Documentation    PATIENT'S NAME: Asha Arrieta  MRN:   [id number]  :   1995  ACCT. NUMBER: [id number]  DATE OF SERVICE: 3/14/23  START TIME: 12:30 PM  END TIME:  2:30 PM  FACILITATOR(S): Maddie Ring LADC  TOPIC: BEH Group Therapy  Number of patients attending the group:  6  Group Length:  2 Hours    Group Therapy Type: Recovery strategies    Summary of Group / Topics Discussed:    Recovery Principles and Spiritual Care    Spiritual Group Therapy consisted of Spiritual Care and addressing Principles that are important in recovery, and wellness of self. Patients and facilitators (Jose Eilas & SISSY)  reviewed topics related to sense of self, identity, and relations to others within spirituality/relision/nonspiritual concepts.       Group Attendance:  Attended group session and Excused from group session    Patient's response to the group topic/interactions:  verbalizations were off topic    Patient appeared to be Distracted and Passively engaged.        Client specific details:  Patient appeared to struggle with tracking discussion, making comments that were not related to the topic. Patient was absent for part of group due to approved psychiatry appointment.
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
## 2023-03-14 NOTE — GROUP NOTE
"Group Therapy Documentation    PATIENT'S NAME: Asha Arrieta  MRN:   [id number]  :   1995  ACCT. NUMBER: [id number]  DATE OF SERVICE: 3/14/23  START TIME:  9:00 AM  END TIME: 11:00 AM  FACILITATOR(S): Lashanda Paz LADC  TOPIC: BEH Group Therapy  Number of patients attending the group: 6  Group Length:  2 Hours    Group Therapy Type: Recovery strategies    Summary of Group / Topics Discussed:    Recovery Principles     Patients completed daily check ins and the question of the day, \" What is one quality in relationships do you want to work on?\"     Patients discussed trust in self and others, communication, honesty, and the best way to communicate feelings/personal needs.     Patients engaged in reading and processing daily meditations.         Group Attendance:  Attended group session and Excused for therapy 9-10AM.     Patient's response to the group topic/interactions:  did not discuss personal experience, did not share thoughts verbally, left the group on several occasions and verbalizations were off topic    Patient appeared to be Actively participating.        Client specific details:  Asha  attended AM small group therapy. Patient struggled with engagement in group. Patient returned from therapy, and was fidgety and was disruptive of peers sharing in group on a few occasions in which group paused. When staff verbalized and asked if she was ok, due to her fidgety behavior. Patient responded, \" I am not doing anything wrong,\" in a paul voice.  Patient appears to not be benefiting from group engagement.       "

## 2023-03-14 NOTE — GROUP NOTE
Psychoeducation Group Documentation    PATIENT'S NAME: Asha Arrieta  MRN:   [id number]  :   1995  ACCT. NUMBER: [id number]  DATE OF SERVICE: 3/14/23  START TIME:  3:00 PM  END TIME:  4:00 PM  FACILITATOR(S): Shoshana Sharma LADC; Cecilia Salazar LADC  TOPIC: BEH Pyschoeducation  Number of patients attending the group: 15  Group Length:  1 Hours    Skills Group Therapy Type: Recovery skills    Summary of Group / Topics Discussed:    Balanced lifestyle skills and Relapse prevention skills    Group Attendance:  Attended group session    Patient's response to the group topic/interactions:  cooperative with task    Patient appeared to be Attentive and Engaged.         Client specific details: Pt watched a performance related to recovery and participated in subsequent discussion.

## 2023-03-15 ENCOUNTER — HOSPITAL ENCOUNTER (INPATIENT)
Facility: CLINIC | Age: 28
LOS: 21 days | Discharge: PSYCHIATRIC OR SUBSTANCE USE FACILITY | End: 2023-04-11
Attending: PSYCHIATRY & NEUROLOGY | Admitting: PSYCHIATRY & NEUROLOGY
Payer: COMMERCIAL

## 2023-03-15 ENCOUNTER — HOSPITAL ENCOUNTER (OUTPATIENT)
Dept: BEHAVIORAL HEALTH | Facility: CLINIC | Age: 28
Discharge: HOME | End: 2023-03-15
Attending: FAMILY MEDICINE
Payer: COMMERCIAL

## 2023-03-15 DIAGNOSIS — F15.29: ICD-10-CM

## 2023-03-15 DIAGNOSIS — F19.20 CHEMICAL DEPENDENCY (H): ICD-10-CM

## 2023-03-15 DIAGNOSIS — F25.9 SCHIZOAFFECTIVE DISORDER, UNSPECIFIED TYPE (H): ICD-10-CM

## 2023-03-15 DIAGNOSIS — Z20.822 CONTACT WITH AND (SUSPECTED) EXPOSURE TO COVID-19: ICD-10-CM

## 2023-03-15 LAB — SARS-COV-2 RNA RESP QL NAA+PROBE: NEGATIVE

## 2023-03-15 PROCEDURE — C9803 HOPD COVID-19 SPEC COLLECT: HCPCS | Performed by: PSYCHIATRY & NEUROLOGY

## 2023-03-15 PROCEDURE — 250N000013 HC RX MED GY IP 250 OP 250 PS 637: Performed by: PSYCHIATRY & NEUROLOGY

## 2023-03-15 PROCEDURE — H2035 A/D TX PROGRAM, PER HOUR: HCPCS | Mod: HQ

## 2023-03-15 PROCEDURE — 250N000013 HC RX MED GY IP 250 OP 250 PS 637: Performed by: EMERGENCY MEDICINE

## 2023-03-15 PROCEDURE — 99285 EMERGENCY DEPT VISIT HI MDM: CPT | Performed by: PSYCHIATRY & NEUROLOGY

## 2023-03-15 PROCEDURE — 99285 EMERGENCY DEPT VISIT HI MDM: CPT | Mod: 25 | Performed by: PSYCHIATRY & NEUROLOGY

## 2023-03-15 PROCEDURE — U0003 INFECTIOUS AGENT DETECTION BY NUCLEIC ACID (DNA OR RNA); SEVERE ACUTE RESPIRATORY SYNDROME CORONAVIRUS 2 (SARS-COV-2) (CORONAVIRUS DISEASE [COVID-19]), AMPLIFIED PROBE TECHNIQUE, MAKING USE OF HIGH THROUGHPUT TECHNOLOGIES AS DESCRIBED BY CMS-2020-01-R: HCPCS | Performed by: PSYCHIATRY & NEUROLOGY

## 2023-03-15 PROCEDURE — 90791 PSYCH DIAGNOSTIC EVALUATION: CPT

## 2023-03-15 RX ORDER — LANOLIN ALCOHOL/MO/W.PET/CERES
3 CREAM (GRAM) TOPICAL
Status: DISCONTINUED | OUTPATIENT
Start: 2023-03-15 | End: 2023-04-11 | Stop reason: HOSPADM

## 2023-03-15 RX ORDER — FLUTICASONE PROPIONATE 50 MCG
1 SPRAY, SUSPENSION (ML) NASAL DAILY
Status: DISCONTINUED | OUTPATIENT
Start: 2023-03-15 | End: 2023-03-22

## 2023-03-15 RX ORDER — BUSPIRONE HYDROCHLORIDE 7.5 MG/1
7.5 TABLET ORAL 3 TIMES DAILY
COMMUNITY
Start: 2023-03-14

## 2023-03-15 RX ORDER — QUETIAPINE 150 MG/1
300 TABLET, FILM COATED, EXTENDED RELEASE ORAL AT BEDTIME
Status: DISCONTINUED | OUTPATIENT
Start: 2023-03-16 | End: 2023-03-21

## 2023-03-15 RX ORDER — BUSPIRONE HYDROCHLORIDE 7.5 MG/1
7.5 TABLET ORAL 3 TIMES DAILY
Status: DISCONTINUED | OUTPATIENT
Start: 2023-03-15 | End: 2023-04-11 | Stop reason: HOSPADM

## 2023-03-15 RX ORDER — QUETIAPINE FUMARATE 100 MG/1
100 TABLET, FILM COATED ORAL 2 TIMES DAILY
Status: DISCONTINUED | OUTPATIENT
Start: 2023-03-15 | End: 2023-03-21

## 2023-03-15 RX ORDER — QUETIAPINE 300 MG/1
600 TABLET, FILM COATED, EXTENDED RELEASE ORAL AT BEDTIME
Status: DISCONTINUED | OUTPATIENT
Start: 2023-03-15 | End: 2023-03-15

## 2023-03-15 RX ORDER — BUSPIRONE HYDROCHLORIDE 7.5 MG/1
7.5 TABLET ORAL ONCE
Status: COMPLETED | OUTPATIENT
Start: 2023-03-15 | End: 2023-03-15

## 2023-03-15 RX ORDER — CETIRIZINE HYDROCHLORIDE 5 MG/1
10 TABLET ORAL DAILY
Status: DISCONTINUED | OUTPATIENT
Start: 2023-03-16 | End: 2023-04-11 | Stop reason: HOSPADM

## 2023-03-15 RX ORDER — IBUPROFEN 600 MG/1
600 TABLET, FILM COATED ORAL 3 TIMES DAILY PRN
Status: DISCONTINUED | OUTPATIENT
Start: 2023-03-15 | End: 2023-04-11 | Stop reason: HOSPADM

## 2023-03-15 RX ADMIN — QUETIAPINE FUMARATE 600 MG: 300 TABLET, EXTENDED RELEASE ORAL at 21:21

## 2023-03-15 RX ADMIN — NICOTINE POLACRILEX 2 MG: 2 GUM, CHEWING BUCCAL at 10:50

## 2023-03-15 RX ADMIN — FLUTICASONE PROPIONATE 1 SPRAY: 50 SPRAY, METERED NASAL at 22:43

## 2023-03-15 RX ADMIN — QUETIAPINE 100 MG: 100 TABLET ORAL at 21:21

## 2023-03-15 RX ADMIN — BUSPIRONE HYDROCHLORIDE 7.5 MG: 7.5 TABLET ORAL at 11:51

## 2023-03-15 RX ADMIN — MELATONIN TAB 3 MG 3 MG: 3 TAB at 21:22

## 2023-03-15 RX ADMIN — BUSPIRONE HYDROCHLORIDE 7.5 MG: 7.5 TABLET ORAL at 22:45

## 2023-03-15 RX ADMIN — IBUPROFEN 600 MG: 600 TABLET ORAL at 21:20

## 2023-03-15 ASSESSMENT — ACTIVITIES OF DAILY LIVING (ADL)
ADLS_ACUITY_SCORE: 35

## 2023-03-15 ASSESSMENT — COLUMBIA-SUICIDE SEVERITY RATING SCALE - C-SSRS
ATTEMPT LIFETIME: NO
1. HAVE YOU WISHED YOU WERE DEAD OR WISHED YOU COULD GO TO SLEEP AND NOT WAKE UP?: NO
2. HAVE YOU ACTUALLY HAD ANY THOUGHTS OF KILLING YOURSELF?: NO

## 2023-03-15 NOTE — DISCHARGE INSTRUCTIONS
Behavioral Discharge Planning and Instructions    Summary: You were admitted on 3/15/2023  due to Psychotic Symptomology.  You were treated by Dr. Katie Abrams and discharged on 4/11/2023 from [facility] to [facility]    Main Diagnosis: Schizoaffective disorder, depressed type vs bipolar type, with acute decompensation in psychosis symptoms and some mood instability (provisional)  Unspecified anxiety     Health Care Follow-up:     You are being transferred to the [facility] at:    [address] [place] [address]  [id number]  Visiting hours:  Weekdays: 4-8 p.m.  Weekends & Holidays: 2-8 p.m.    Attend all scheduled appointments with your outpatient providers. Call at least 24 hours in advance if you need to reschedule an appointment to ensure continued access to your outpatient providers.     Major Treatments, Procedures and Findings:  You were provided with: a psychiatric assessment, assessed for medical stability, medication evaluation and/or management, group therapy, milieu management, and medical interventions    Symptoms to Report: feeling more aggressive, increased confusion, losing more sleep, mood getting worse, or thoughts of suicide    Early warning signs can include: increased depression or anxiety sleep disturbances increased thoughts or behaviors of suicide or self-harm  increased unusual thinking, such as paranoia or hearing voices    Safety and Wellness:  Take all medicines as directed.  Make no changes unless your doctor suggests them.      Follow treatment recommendations.  Refrain from alcohol and non-prescribed drugs.  Ask your support system to help you reduce your access to items that could harm yourself or others. If there is a concern for safety, call 911.    Resources:   Crisis Intervention: 844.583.8068 or 087-789-6405 (TTY: 207.597.5577).  Call anytime for help.  National [place] on Mental Illness (www.[place].shell.org): 434.625.3320  "or 082-256-7141.  [place] Association for Children's Mental Health (www.mac.org): 558.221.6546.  Alcoholics Anonymous (www.alcoholics-anonymous.org): Check your phone book for your local chapter.  Suicide Awareness Voices of Education (SAVE) (www.save.org): 469-428-LLVU (9355)  National Suicide Prevention Line (www.mentalhealthmn.org): 482-569-GTBV (7388)  [facility] Crisis (COPE) Response - Adult 580 579-5658  Text 4 Life: txt \"LIFE\" to 94148 for immediate support and crisis intervention  Crisis text line: Text \"[place]\" to 937056. Free, confidential, 24/7.    General Medication Instructions:   See your medication sheet(s) for instructions.   Take all medicines as directed.  Make no changes unless your doctor suggests them.   Go to all your doctor visits.  Be sure to have all your required lab tests. This way, your medicines can be refilled on time.  Do not use any drugs not prescribed by your doctor.  Avoid alcohol.    Advance Directives:   Scanned document on file with [facility]? No scanned doc  Is document scanned? No. Copy Requested.  Honoring Choices Your Rights Handout: Informed and given  Was more information offered? Pt declined    The Treatment team has appreciated the opportunity to work with you. If you have any questions or concerns about your recent admission, you can contact the unit which can receive your call 24 hours a day, 7 days a week. They will be able to get in touch with a Provider if needed. The unit number is [phone].  "

## 2023-03-15 NOTE — PROGRESS NOTES
"Writer left voicemail messages for both [facility] case workers, Sanford and Tyesha, regarding patient concerns after care conference with patient (see Epic note). At this time, patient reported she \"feels safe with some things [here], but not with the rules and staff here.\" Patient declined to give further details about why she does not feel entirely safe at [facility] despite prompting by primary counselors. Patient reported concerns that other patients on the unit were \"trying to give me STDs and tuberculosis,\" \"my children and grandchildren were in detox with me and I now see them inside the other people here,\" and \"people here are being grown for their skin to help clean the insides of other people.\"    Update: @1108 Writer spoke with Sanford regarding concerns and need to discharge patient from the program. Sanford reported that he is currently working on revoking patient's stay of commitment, placing her on full commitment. Sanford reported that \"we can't come get her for at least a couple of days.\" Sanford further verbalized that they have an IRTS facility for patient, but additional information and coordination needs to happen and patient most likely cannot transfer to the facility until early next week. Sanford also stated that by revoking patient's commitment she can stay in a psychiatric hold until they can come up to transfer her to the [facility] in the next day or two. Writer provided Sanford with the [facility] Charge nurse phone number ([phone]).     Writer followed up with + nursing staff and clinical staff regarding situation. Writer to continue updating staff regarding coordination of patient transfer.  "

## 2023-03-15 NOTE — ED NOTES
(Lashanda) from [facility] that there is process in works that patient's commitment may be revoked. Pt cannot return to [facility].

## 2023-03-15 NOTE — CONSULTS
Diagnostic Evaluation Consultation  Crisis Assessment    Patient was assessed: In Person  Patient location: [facility]  Was a release of information signed: Yes, primary care, psychiatrist,and .      Referral Data and Chief Complaint  Patient is a 27 year old female presenting to the [facility] ED for the following concerns: [facility] inability to meet patient's needs.      Informed Consent and Assessment Methods     Patient is her own guardian. Writer met with patient and explained the crisis assessment process, including applicable information disclosures and limits to confidentiality, assessed understanding of the process, and obtained consent to proceed with the assessment. Patient was observed to be able to participate in the assessment as evidenced by verbal consent. Assessment methods included conducting a formal interview with patient, review of medical records, collaboration with medical staff, and obtaining relevant collateral information from family and community providers when available.    Over the course of this crisis assessment provided reassurance, offered validation, engaged patient in problem solving and disposition planning, worked with patient on safety and aftercare planning, assisted in processing patient's thoughts and feeling relating to presenting concerns, and provided psychoeducation. Patient's response to interventions was fully engaged.     Summary of Patient Situation     Patient reports ongoing conflict with the [facility] staff regarding her level of participation.  Patient admits she did not read a purple information packet upon her arrival, but has also felt that when she does speak up in groups her opinions are not valued.  Patient reports she had a doctor's appointment this morning which she forgot to tell staff about.  Patient left group to take the doctor's appointment virtually.  Patient reports the [facility] staff said she did not  have an appointment because it was not showing up in [facility], but this appointment was outside of the [place] system.  Patient reports during this verbal argument with staff, she dropped her beverage and colored pencils. Patient felt that she could have accidentally hurt herself with the colored pencils all over the ground.  Patient reports telling them she did not feel safe because of this, but reports she did not spill these pencils on purpose.  Patient reports other clients helped her  the pencils which made her feel better.  Patient reports the [facility] staff nonetheless felt she needed to be evaluated in our emergency department due to feeling unsafe.  Patient reports she wants to complete [facility], but they have been trying to kick her out since she arrived.  Patient  reports frustration that staff allegedly told her they would come back to get her but are now refusing to accept her back.    Significant Clinical and Psychosocial History    Patient is currently under provisional discharge from civil commitment in [facility]. Patient was mot recently at [facility], followed by [facility]. Patient then transferred to our [facility] where she has been in treatment for the past 2 weeks. Patient has about 2 weeks left in the program and would like to complete it. Patient would otherwise be homeless and unemployed. Patient has no history of inpatient mental health admissions visible in medical record. Patient reports having therapists Micheal Ring and Lashanda Paz at [facility]. Patient has psychiatry and primary care in [place] with providers listed in current care team below.     Collateral Information    Per [facility] therapist Delphine, patient has been inappropriate for their program from the beginning because they are not a mental health treatment program, only substance use.  Patient is reportedly out of their scope and ability to treat.  Patient is  "reportedly constantly interrupting group, disruptive, loud, and aggressive.  Patient has made paranoid comments such as someone is sneaking into her room to give her tuberculosis. Patient has also made comments since her arrival including:\"my children and grandchildren were in detox with me and I now see them inside the other people here,\" and \"people here are being grown for their skin to help clean the insides of other people.\" Patient's therapist reports patient made a comment that she felt unsafe today, so they brought her to the emergency department in order to seek alternative level of care such as \"BEC\".  Patient's therapist reports the  is looking for an  IRTS.      spoke with patient's [facility] [facility]  who reports they are looking for an IRTS placement, but this will not be secured overnight.  If patient is not allowed back at [facility], they would like her to board in our emergency department as she is vulnerable in the community due to her mental health and substance use.  Patient's  reports plan to revoke her provisional discharge from civil commitment.     Risk Assessment  [place] Suicide Severity Rating Scale Full Clinical Version:  Suicidal Ideation  1. Wish to be Dead (Lifetime): (P) No  2. Non-Specific Active Suicidal Thoughts (Lifetime): (P) No     Suicidal Behavior  Actual Attempt (Lifetime): (P) No  Has subject engaged in non-suicidal self-injurious behavior? (Lifetime): (P) No  C-SSRS Risk (Lifetime/Recent)  Calculated C-SSRS Risk Score (Lifetime/Recent): (P) No Risk Indicated    Validity of evaluation is not impacted by presenting factors during interview.   Comments regarding subjective versus objective responses to [place] tool: congruent.  Environmental or Psychosocial Events: unstable housing, homelessness and ongoing abuse of substances  Chronic Risk Factors: serious, persistent mental illness   Warning Signs: increasing " substance use or abuse  Protective Factors: good treatment engagement, able to access care without barriers and help seeking  Interpretation of Risk Scoring, Risk Mitigation Interventions and Safety Plan:  Patient denies any current or historical suicidal ideation. Patient denies any prior suicide attempts.       Does the patient have thoughts of harming others? No     Is the patient engaging in sexually inappropriate behavior?  No       Current Substance Abuse     Is there recent substance abuse? Patient reports history of abusing methamphetamine and marijuana.     Was a urine drug screen or blood alcohol level obtained: No       Mental Status Exam     Affect: Appropriate   Appearance: Appropriate    Attention Span/Concentration: Attentive  Eye Contact: Engaged   Fund of Knowledge: Appropriate    Language /Speech Content: Fluent   Language /Speech Volume: Normal    Language /Speech Rate/Productions: Normal    Recent Memory: Intact   Remote Memory: Intact   Mood: Irritable    Orientation to Person: Yes    Orientation to Place: Yes   Orientation to Time of Day: Yes    Orientation to Date: Yes    Situation (Do they understand why they are here?): Yes    Psychomotor Behavior: Normal    Thought Content: Paranoia   Thought Form: Intact      History of commitment: Yes currently through [facility].       Medication    Psychotropic medications:   No current facility-administered medications for this encounter.     Current Outpatient Medications   Medication     acetaminophen (TYLENOL) 325 MG tablet     albuterol (PROAIR HFA/PROVENTIL HFA/VENTOLIN HFA) 108 (90 Base) MCG/ACT inhaler     alum & mag hydroxide-simethicone (MAALOX) 200-200-20 MG/5ML SUSP suspension     benzocaine-menthol (CEPACOL) 15-3.6 MG lozenge     busPIRone (BUSPAR) 7.5 MG tablet     cetirizine (ZYRTEC) 10 MG tablet     fluticasone (FLONASE) 50 MCG/ACT nasal spray     guaiFENesin 200 MG/10ML LIQD     ibuprofen (ADVIL/MOTRIN) 200 MG tablet     melatonin 3  MG tablet     nicotine (NICORETTE) 4 MG gum     omeprazole (PRILOSEC) 20 MG DR capsule     ondansetron (ZOFRAN ODT) 4 MG ODT tab     QUEtiapine (SEROQUEL) 50 MG tablet     QUEtiapine ER (SEROQUEL XR) 300 MG 24 hr tablet     senna-docusate (SENOKOT-S/PERICOLACE) 8.6-50 MG tablet     Facility-Administered Medications Ordered in Other Encounters   Medication     Self Administer Medications: Behavioral Services     Medication changes made in the last two weeks: No       Current Care Team    Primary Care Provider: Humaira Greer MD  [address] [place] [id number] [phone] (Work)  [phone] (Fax)   Psychiatrist: Jatin May MD  [address] [place] [id number] [phone] (Work)  [phone] (Fax)   Therapist: Micheal Ring and Lashanda Paz at [facility]  [phone]  : [facility]  [phone]  Email: [email or web address]        Diagnosis    1 Schizoaffective disorder, Bipolar type F25.0       2 Unspecified stimulant-related disorder; 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  Patient was calm and cooperative in our emergency department.  Patient engaged fully in the assessment process.  Patient is able to concisely articulate the events preceding her emergency department encounter today.  Patient reports the [facility] staff thought she was leaving group without reason, when patient reports she had a doctor's appointment.   confirmed with outside primary care clinic that patient did in fact have a virtual appointment today, this was not a delusion.  Patient reports this caused a verbal conflict with [facility] staff, resulting in her dropping a beverage and colored pencils.  Patient reports this made her feel unsafe.  Patient denied any SI, HI, or SIB.  Patient has made various comments  indicating a level of underlying paranoia to [facility] staff since her arrival in the program 2 weeks ago. Patient endorses compliance with medication regimen. Patient nonetheless does not appear to be responding to internal stimuli.  Patient had organized thought process.  Patient will be placed in observation as she is not allowed to be returned to [facility], but her  plans to revoke her provisional discharge in hopes of seeking IRTS placement.  Disposition    Recommended disposition: Residential Treatment: IRTS or [facility]       Reviewed case and recommendations with attending provider. Attending Name: Dr. Jose D Pineda       Attending concurs with disposition: Yes       Patient and/or validated legal guardian concurs with disposition: Yes       Final disposition: [facility] refuses to accept patient back on account of they cannot treat mental health. Patient's  is working on IRTS placement and revoking patient's provisional discharge. Patient will be placed on OBS as she does not need to be admitted, is only awaiting placement.    Outpatient Details (if applicable):   Aftercare plan and appointments placed in the AVS and provided to patient: Yes    Was lethal means counseling provided as a part of aftercare planning? No, no suicidal or homicidal risk at this time.       Assessment Details    Patient interview started at: 11:15am and completed at: 12:15pm.     Total duration spent on the patient case in minutes: 1.0 hrs      CPT code(s) utilized: 56986 - Psychotherapy for Crisis - 60 (30-74*) min       SHILA Weinstein, Psychotherapist Trainee  [facility] - Triage & Transition Services  Callback: [phone]      Aftercare Plan    If I am feeling unsafe or I am in a crisis, I will:   Contact my established care providers   Call the National Suicide Prevention Lifeline: 988  Go to the nearest emergency room   Call 978     Warning signs that I or other people might notice when a  crisis is developing for me: any substance use, hearing or seeing things that other people can't    Things I am able to do on my own to cope or help me feel better: Grounding Techniques:    Try to notice where you are, your surroundings including the people, the sounds like the TV or radio.    Concentrate on your breathing. Take a deep cleansing breath from your diaphragm. Count the breaths as you exhale. Make sure you breath slowly.    Hold something that you find comforting, for some it may be a stuffed animal or a blanket. Notice how it feels in your hands. Is it hard or soft?    During a non-crisis time make a list of positive affirmations. Print them out and keep them handy for times of intense anxiety. At those times, read them aloud.  Try the [employer] game:    Name 5 things you can see in the room with you    Name 4 things you can feel ( chair on my back  or  feet on floor )     Name 3 things you can hear right now ( people talking  or  tv )     Name 2 things you can smell right now (or, 2 things you like the smell of)     Name 1 good thing about yourself  Create A Safe Place    Image a safe place -- it can be a real or imaginary place:     What do you see -- especially colors?     What sounds do you hear?     What sensations do you feel?     What smells do you smell?     What people or animals would you want in your safe place?     Imagine a protective bubble, wall or boundary around your safe place.     Imagine a door or gate with a guard at your safe place.     Image a lock and key to your safe place and only you can unlock it.    You can draw or make a collage that represents your safe place.     Choose a souvenir of your safe place -- a color, an object, a song.     Keep your image of your safe place so you can come back to it when you need to.    Things I can use or do for distraction: Reduce Extreme Emotion  QUICKLY:  Changing Your Body Chemistry      T:  Change your body Temperature to change your  autonomic nervous system   ? Use Ice Water to calm yourself down FAST   ? Put your face in a bowl of ice water (this is the best way; have the person keep his/her face in ice water for 30-45 seconds - initial research is showing that the longer s/he can hold her/his face in the water, the better the response), or   ? Splash ice water on your face, or hold an ice pack on your face      I:  Intensely exercise to calm down a body revved up by emotion   ? Examples: running, walking fast, jumping, playing basketball, weight lifting, swimming, calisthenics, etc.   ? Engage in exercises that DO NOT include violent behaviors. Exercises that utilize violent behaviors tend to function as  behavioral rehearsal,  and rather than calming the person down, may actually  rev  the person up more, increasing the likelihood of violence, and lessening the likelihood that they will  burn off  energy     P:  Progressively relax your muscles   ? Starting with your hands, moving to your forearms, upper arms, shoulders, neck, forehead, eyes, cheeks and lips, tongue and teeth, chest, upper back, stomach, buttocks, thighs, calves, ankles, feet   ? Tense (10 seconds,   of the way), then relax each muscle (all the way)   ? Notice the tension   ? Notice the difference when relaxed (by tensing first, and then relaxing, you are able to get a more thorough relaxation than by simply relaxing)     P: Paced breathing to relax   ? The standard technique is to begin with counting the number of steps one takes for a typical inhale, then counting the steps one takes for a typical exhale, and then lengthening the amount of steps for the exhalation by one or two steps.  OR  ? Repeat this pattern for 1-2 minutes  ? Inhale for four (4) seconds   ? Exhale for six (6) to eight (8) seconds   ? Research demonstrated that one can change one's overall level of anxiety by doing this exercise for even a few minutes per day     Changes I can make to support my mental  "health and wellness: follow up with  on IRTS placement options     People in my life that I can ask for help: , therapsits, medication management, and primary care providers     Your county has a mental health crisis team you can call 24/7: Crisis Response for [facility]  Crisis Response of [facility] provides qualified counselors to answer any problem 24 hours a day, 365 days a year by phone.    Services are no cost, confidential and immediately accessible.    Counselors help the caller reach a place of emotional and physical well-being. They point the caller to existing social resources that provide long-term support. Phone counselors provide over-the-phone assessments, safety plans, and support. Our phone counselors can connect you to local teams to meet where you are and provide additional support.    A mental health crisis or emergency is any situation in which a person s actions, feelings, and behaviors can hurt themselves or others. Without timely mental health intervention, the crisis could put them at risk of being unable to care for themselves or function in the community in a healthy manner.    If you need help, contact 4-846-EPVCRM8 or text HOME to 093599.    Other things that are important when I'm in crisis: continue taking medication as prescribed.     Additional resources and information:     Crisis Lines  Crisis Text Line  Text 795350  You will be connected with a trained live crisis counselor to provide support.    Por francisanol, texto  NELLY a 942589 o texto a 442-AYUDAME en WhatsApp    [employer] [place] [facility] (LGBTQ Youth Crisis Line)  6.182.596.4435  text START to 008-429      Community Resources  Fast Tracker  Linking people to mental health and substance use disorder resources  fasttrackermn.org     [place] Mental Health Warm Line  Peer to peer support  Monday thru Saturday, 12 pm to 10 pm  878.220.5043 or 1.300.319.3121  Text \"Support\" to " 28499    National [place] on Mental Illness (DIONICIO)  698.928.8596 or 1.888.DIONICIO.HELPS      Mental Health Apps  My3  https://myTotangopp.org/    VirtualHopeBox  https://Cask/apps/virtual-hope-box/      Additional Information  Today you were seen by a licensed mental health professional through Triage and Transition services, [facility] (P)  for a crisis assessment in the Emergency Department at [facility].  It is recommended that you follow up with your established providers (psychiatrist, mental health therapist, and/or primary care doctor - as relevant) as soon as possible. Coordinators from [facility] will be calling you in the next 24-48 hours to ensure that you have the resources you need.  You can also contact [facility] coordinators directly at [phone]. You may have been scheduled for or offered an appointment with a mental health provider. [facility] maintains an extensive network of licensed behavioral health providers to connect patients with the services they need.  We do not charge providers a fee to participate in our referral network.  We match patients with providers based on a patient's specific needs, insurance coverage, and location.  Our first effort will be to refer you to a provider within your care system, and will utilize providers outside your care system as needed.     in Persian/oriented to person, place, time and situation

## 2023-03-15 NOTE — GROUP NOTE
"Group Therapy Documentation    PATIENT'S NAME: Asha Arrieta  MRN:   [id number]  :   1995  ACCT. NUMBER: [id number]  DATE OF SERVICE: 3/15/23  START TIME:  9:45 AM  END TIME: 11:30 AM  FACILITATOR(S): Lydia Gonzales LADC  TOPIC: BEH Group Therapy  Number of patients attending the group: 4  Group Length:  1.25 Hrs    Group Therapy Type: Recovery strategies    Summary of Group / Topics Discussed:    Recovery Principles  Patients started group with check-in: name, feelings, assignments and question of the day \"what brings them samuel?\". One of their peer presented to the group her drug use history assignment, discussed and gave feedbacks.  Ended session with what their takeaways are, and serenity prayer.      Group Attendance:  Excused from group session    Patient's response to the group topic/interactions:  excused from group    Patient appeared to be: excused from group.        Client specific details:  Patient excused from group due to ED admittance.      "
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
## 2023-03-15 NOTE — ED PROVIDER NOTES
"    [facility] EMERGENCY DEPARTMENT ([facility])     March 15, 2023     History     Chief Complaint   Patient presents with     Psychiatric Evaluation     [facility] would like pt to be \"assessed for BEC\"     HPI  Asha Arrieta is a 27 year old female with a past medical history significant for bipolar type schizoaffective disorder, opioid abuse, methamphetamine abuse who presents to the Emergency Department for mental health evaluation. Patient was seen by DEC . Per , patient is here from [facility] as they state they treat for substance use and do not want to treat the patient for mental health. Patient states she had an appointment today and was meeting with her PCP on her phone. [facility] did not know the patient had an appointment today could not see it on their schedule and thought she was having delusional thinking when she said she had an appointment. They said that the patient was not participating in programming, the patient said she was participating but wanted to go to her appointment then come back. Situation escalated and patient's colored pencils were dumped on the floor. Patient said she felt unsafe, so she was sent here.    Per , patient does appear to be making some paranoid comments, including saying that she is worried someone is coming in her room at night. However, she is not acutely psychotic, not hallucinating, and is very organized. Patient is not suicidal or homicidal.    Per [facility] records, patient saw her psychiatrist yesterday and there were concerns for ongoing paranoia, and patient's lack of involvement in her treatment program. Patient's psychosis was initially felt to be substance-induced or exacerbated by substance use, but now likely is an emerging primary psych illness consistent with schizoaffective disorder. Patient is prescribed quetiapine  mg HS, and 100 mg BID prn anxiety and paranoia.    Past Medical History  No past medical history " on file.  No past surgical history on file.  acetaminophen (TYLENOL) 325 MG tablet  albuterol (PROAIR HFA/PROVENTIL HFA/VENTOLIN HFA) 108 (90 Base) MCG/ACT inhaler  alum & mag hydroxide-simethicone (MAALOX) 200-200-20 MG/5ML SUSP suspension  benzocaine-menthol (CEPACOL) 15-3.6 MG lozenge  busPIRone (BUSPAR) 7.5 MG tablet  cetirizine (ZYRTEC) 10 MG tablet  fluticasone (FLONASE) 50 MCG/ACT nasal spray  guaiFENesin 200 MG/10ML LIQD  ibuprofen (ADVIL/MOTRIN) 200 MG tablet  melatonin 3 MG tablet  nicotine (NICORETTE) 4 MG gum  omeprazole (PRILOSEC) 20 MG DR capsule  ondansetron (ZOFRAN ODT) 4 MG ODT tab  QUEtiapine (SEROQUEL) 50 MG tablet  QUEtiapine ER (SEROQUEL XR) 300 MG 24 hr tablet  senna-docusate (SENOKOT-S/PERICOLACE) 8.6-50 MG tablet      Allergies   Allergen Reactions     Haloperidol Other (See Comments)     Shakes      Lac Bovis Anaphylaxis     Throat swells up      Morphine Hives     Dust Mite Extract      Watery eyes and runny nose     Pollen Extract      Watery eyes and stuffy nose     Zyprexa [Olanzapine]      Skin rash, wants to talk to her MD about it     Family History  No family history on file.  Social History       Past medical history, past surgical history, medications, allergies, family history, and social history were reviewed with the patient. No additional pertinent items.      A medically appropriate review of systems was performed with pertinent positives and negatives noted in the HPI, and all other systems negative.    Physical Exam   BP: (!) 140/76  Pulse: 110  Temp: 98.1  F (36.7  C)  Resp: 20  Height: 152.4 cm (5')  Weight: 88.5 kg (195 lb)  SpO2: 100 %  Physical Exam  Vitals and nursing note reviewed.   HENT:      Head: Normocephalic.   Eyes:      Pupils: Pupils are equal, round, and reactive to light.   Pulmonary:      Effort: Pulmonary effort is normal.   Musculoskeletal:         General: Normal range of motion.      Cervical back: Normal range of motion.   Neurological:      General:  No focal deficit present.      Mental Status: She is alert.   Psychiatric:         Attention and Perception: Attention and perception normal. She does not perceive auditory or visual hallucinations.         Mood and Affect: Mood normal.         Speech: Speech normal.         Behavior: Behavior normal. Behavior is not agitated, aggressive, hyperactive or combative. Behavior is cooperative.         Thought Content: Thought content is paranoid. Thought content is not delusional. Thought content does not include homicidal or suicidal ideation.         Cognition and Memory: Cognition and memory normal.         Judgment: Judgment normal.           ED Course, Procedures, & Data      Procedures       ED Course Selections: A consult was attained from the  DEC service. The case was discussed with Juice Barbosa from that service. The consulting service's recommendations were provided at 1 PM. 10 minutes spent discussing case, care and disposition.    10 minutes spent reviewing prior records including patient's recent e-visit with her established care provider and notes in [facility].     -----  Observation Addendum  With this Addendum, this ED Provider Note may also serve as an Observation H&P    Observation Initiation Date: Mar 15, 2023    Patient presenting with paranoia and not participating in her treatment.    A DEC assessment was completed, and the case was discussed with the . The  recommended discharge. See separate DEC note from today's date for details on the assessment.    During the initial care period, the patient did not require medications for agitation, and did not require restraints/seclusion for patient and/or provider safety.     The patient's outpatient medications were not reconciled and ordered.     The patient was found to have a psychiatric condition that would benefit from an observation stay in the emergency department for further psychiatric stabilization and/or coordination of a  safe disposition. The observation plan includes serial assessments of psychiatric condition, potential administration of medications if indicated, further disposition pending the patient's psychiatric course during the monitoring period.   -----           Results for orders placed or performed during the hospital encounter of 03/15/23   Asymptomatic COVID-19 Virus (Coronavirus) by PCR Nose     Status: Normal    Specimen: Nose; Swab   Result Value Ref Range    SARS CoV2 PCR Negative Negative    Narrative    Testing was performed using the Xpert Xpress SARS-CoV-2 Assay on the Cepheid Gene-Xpert Instrument Systems. Additional information about this Emergency Use Authorization (EUA) assay can be found via the Lab Guide. This test should be ordered for the detection of SARS-CoV-2 in individuals who meet SARS-CoV-2 clinical and/or epidemiological criteria as well as from individuals without symptoms or other reasons to suspect COVID-19. Test performance for asymptomatic patients has only been established in anterior nasal swab specimens. This test is for in vitro diagnostic use under the FDA EUA for laboratories certified under CLIA to perform high complexity testing. This test has not been FDA cleared or approved. A negative result does not rule out the presence of PCR inhibitors in the specimen or target RNA concentration below the limit of detection for the assay. The possibility of a false negative should be considered if the patient's recent exposure or clinical presentation suggests COVID-19. This test was validated by the [facility]. This laboratory is certified under the Clinical Laboratory Improvement Amendments (CLIA) as qualified to perform high complexity laboratory testing.       Medications   nicotine (NICORETTE) gum 2 mg (2 mg Buccal $Given 3/15/23 1050)   busPIRone (BUSPAR) tablet 7.5 mg (7.5 mg Oral $Given 3/15/23 1151)     Labs Ordered and Resulted from Time of ED  Arrival to Time of ED Departure   COVID-19 VIRUS (CORONAVIRUS) BY PCR - Normal       Result Value    SARS CoV2 PCR Negative       No orders to display          Critical care was not performed.     Medical Decision Making  The patient's presentation was of high complexity (a chronic illness severe exacerbation, progression, or side effect of treatment).    The patient's evaluation involved:  an assessment requiring an independent historian (see separate area of note for details)  review of external note(s) from 3+ sources (see separate area of note for details)    The patient's management necessitated moderate risk (limitations due to social determinants of health (see separate area of note for details)) and high risk (a decision regarding hospitalization).      Assessment & Plan    Patient is here for a mental health assessment. Patient has history of chemical dependence and psychosis. She was placed on a stayed commitment and was court-ordered to complete a 28 day residential MI/CD treatment program. Patient reports having completed 2 weeks of her program at [facility]. She has been free of THC for that length of time. She has been exhibiting paranoia and accuses staff of being mean to her. She had an appointment to see her provider today virtually and she felt staff was stopping her. There was something about dropped colored pencils and she ended up being brought to [facility].    Patient has been discharged from [facility]. Her  is planning on revoking her stayed commitment. He does not want her released and is looking into an IRTS placement.    Patient does not exhibit acute safety concern - she denies threatening anyone or herself. She jennings snot exhibit grave disability regarding independent living or care for self. She does not need hospitalization as a result. However, she has no disposition plan presently and her  is planning on a revocation.     Patient will be kept in the ED until she  has a revocation in place for admission to inpatient or placement in an IRTS.    I have reviewed the nursing notes. I have reviewed the findings, diagnosis, plan and need for follow up with the patient.    New Prescriptions    No medications on file       Final diagnoses:   Schizoaffective disorder, unspecified type (H)   Chemical dependency (H)     I, Annabel Zaragoza, am serving as a trained medical scribe to document services personally performed by Jose D Pineda MD, based on the provider's statements to me.   Jose D DONNELLY MD, was physically present and have reviewed and verified the accuracy of this note documented by Annabel Zaragoza.    Jose D Pineda MD  [facility] EMERGENCY DEPARTMENT  3/15/2023     Jose D Pineda MD  03/15/23 0840

## 2023-03-15 NOTE — ED NOTES
Patient has been overall cooperative this shift. Patient is rather rough when speaking to staff, and can be loud, but is redirectable. Patient has been in dayroom and communicates with peers and shows no signs of distress at this time. Will continue to observe patient at this time for safety.

## 2023-03-15 NOTE — PROGRESS NOTES
"Patient was escorted to ED by primary counselors for an Assessment for MH BEC services.   Patient continued to be defensive and use aggressive tone. Patient continued behaviors as she was being checked into the ED Department. Stating, \" I don't know why they are shoving me here, I didn't do anything wrong, and they cant kick me out.\"  Patient was advised due to her starting she doesn't feel safe, and would not share further details on why she's feeling this way.   Staff wanted to have patient assessed for personal safety, and mental health status concerns.     SISSY Zhang             "

## 2023-03-15 NOTE — ED NOTES
[place] with [facility] calling that they are looking to purse revoking her provisional discharge. He can be reached at   Email: [email or web address]

## 2023-03-15 NOTE — ED TRIAGE NOTES
"Pt is coming form lodging, was meeting with her PCP on her phone. Pt already met with her psychiatrist this morning. [facility] didn't know about her appointment so the counselor and staff thought pt was being disrespectful when she got up to go to her appointment and her colored pencils dropped on the floor.     Pt reports she initially told staff she didn't feel safe there. Pt reports ever since she got to lodging there has been \"something going on downstairs and I haven't had sex with nobody\".     Pt reports there are 2 staff there that are rude to her. Pt reports she feels safe there, reports someone was going to get her some new clothes because she is gaining weight there.       "

## 2023-03-15 NOTE — ED NOTES
Pt calm, cooperative with RN assessment. Pt reports that her being here is miscommunication with providers at [facility]. She states that she accidentally knocked over her colored pencils and they assumed she threw them. Pt reports anxiety, stating that she is motivated in completing her 28 day program that she has 2 weeks left. Pt requesting her PRN anxiety medication, voicing frustration that [facility] would not provide her with her PRN prior to bringing her to the ED.     Nicotine gum provided, per pt request. MD notified for PRN anxiety medication order.

## 2023-03-15 NOTE — GROUP NOTE
Psychoeducation Group Documentation    PATIENT'S NAME: Asha Arrieta  MRN:   [id number]  :   1995  ACCT. NUMBER: [id number]  DATE OF SERVICE: 3/15/23  START TIME:  8:30 AM  END TIME:  9:30 AM  FACILITATOR(S): Jamel Romero LADC; Shoshana Sharma LADC  TOPIC: BEH Pyschoeducation  Number of patients attending the group:  5  Group Length:  1 Hours    Skills Group Therapy Type: Recovery skills    Summary of Group / Topics Discussed:    Relationship/social skills          Group Attendance:  Attended group session    Patient's response to the group topic/interactions:  cooperative with task    Patient appeared to be Attentive and Engaged.         Client specific details:  The patient participated in the morning lecture on the Stages of Forgiveness.

## 2023-03-15 NOTE — PROGRESS NOTES
Pitor and Li Haynes RN went down to ED and spoke with Anahi Barry (Charge Nurse) and verbalized that patient will not be returning to [facility].     Writer shared recent communication with CM and paperwork has been filed for revocation, and emphasized the importance of patient being given stable crisis resources, due to her mental health and having no where to go, and no family in the area.      and Li witnessed charge nurse call and pass along information to BEC Assesssor (Litzy).    Writer expressed if any further questions regardign patient to give a call *[phone].      SISSY Zhang

## 2023-03-15 NOTE — GROUP NOTE
"Group Therapy Documentation    PATIENT'S NAME: Asha Arrieta  MRN:   [id number]  :   1995  ACCT. NUMBER: [id number]  DATE OF SERVICE: 3/15/23  START TIME: 12:30 PM  END TIME:  2:30 PM  FACILITATOR(S): Maddie Ring LADC  TOPIC: BEH Group Therapy  Number of patients attending the group:  4  Group Length:  2 Hours    Group Therapy Type: Recovery strategies    Summary of Group / Topics Discussed:    Balanced lifestyle, Emotions/expression, and Self-care activities    Facilitator led group discussion on self-esteem, confidence, and recovery. Patients explored what and how their idea of being beautiful and successful came from through the \"Body Image Autobiography\" assignment. Patients gained insights into how social media contributes to distorted perceptions of beauty and how these distortions impact self-esteem and healthy core beliefs. Patients learned how lack of self-worth and confidence can lead to unhealthy self-soothing and coping behaviors including substance use. Patients then discussed ways to counter distorted perceptions and build confidence through values and using affirmations. Each patient had an opportunity to process the information and provide constructive feedback to facilitator and peers who shared.      Group Attendance:  Excused from group session    Patient's response to the group topic/interactions:    Patient was absent from group due to evaluation in the ED.      "

## 2023-03-16 ENCOUNTER — TELEPHONE (OUTPATIENT)
Dept: BEHAVIORAL HEALTH | Facility: CLINIC | Age: 28
End: 2023-03-16
Payer: COMMERCIAL

## 2023-03-16 LAB
ALBUMIN SERPL BCG-MCNC: 4 G/DL (ref 3.5–5.2)
ALP SERPL-CCNC: 140 U/L (ref 35–104)
ALT SERPL W P-5'-P-CCNC: 36 U/L (ref 10–35)
ANION GAP SERPL CALCULATED.3IONS-SCNC: 11 MMOL/L (ref 7–15)
AST SERPL W P-5'-P-CCNC: 23 U/L (ref 10–35)
BASOPHILS # BLD AUTO: 0.1 10E3/UL (ref 0–0.2)
BASOPHILS NFR BLD AUTO: 1 %
BILIRUB SERPL-MCNC: 0.2 MG/DL
BUN SERPL-MCNC: 9.1 MG/DL (ref 6–20)
CALCIUM SERPL-MCNC: 9.8 MG/DL (ref 8.6–10)
CHLORIDE SERPL-SCNC: 99 MMOL/L (ref 98–107)
CREAT SERPL-MCNC: 0.7 MG/DL (ref 0.51–0.95)
DEPRECATED HCO3 PLAS-SCNC: 26 MMOL/L (ref 22–29)
EOSINOPHIL # BLD AUTO: 0.2 10E3/UL (ref 0–0.7)
EOSINOPHIL NFR BLD AUTO: 2 %
ERYTHROCYTE [DISTWIDTH] IN BLOOD BY AUTOMATED COUNT: 14.5 % (ref 10–15)
GFR SERPL CREATININE-BSD FRML MDRD: >90 ML/MIN/1.73M2
GLUCOSE SERPL-MCNC: 251 MG/DL (ref 70–99)
HCT VFR BLD AUTO: 38.3 % (ref 35–47)
HGB BLD-MCNC: 11.9 G/DL (ref 11.7–15.7)
IMM GRANULOCYTES # BLD: 0 10E3/UL
IMM GRANULOCYTES NFR BLD: 0 %
LYMPHOCYTES # BLD AUTO: 2.2 10E3/UL (ref 0.8–5.3)
LYMPHOCYTES NFR BLD AUTO: 22 %
MCH RBC QN AUTO: 25.4 PG (ref 26.5–33)
MCHC RBC AUTO-ENTMCNC: 31.1 G/DL (ref 31.5–36.5)
MCV RBC AUTO: 82 FL (ref 78–100)
MONOCYTES # BLD AUTO: 0.5 10E3/UL (ref 0–1.3)
MONOCYTES NFR BLD AUTO: 5 %
NEUTROPHILS # BLD AUTO: 6.9 10E3/UL (ref 1.6–8.3)
NEUTROPHILS NFR BLD AUTO: 70 %
NRBC # BLD AUTO: 0 10E3/UL
NRBC BLD AUTO-RTO: 0 /100
PLATELET # BLD AUTO: 260 10E3/UL (ref 150–450)
POTASSIUM SERPL-SCNC: 4.4 MMOL/L (ref 3.4–5.3)
PROT SERPL-MCNC: 7 G/DL (ref 6.4–8.3)
RBC # BLD AUTO: 4.68 10E6/UL (ref 3.8–5.2)
SODIUM SERPL-SCNC: 136 MMOL/L (ref 136–145)
WBC # BLD AUTO: 9.9 10E3/UL (ref 4–11)

## 2023-03-16 PROCEDURE — 85025 COMPLETE CBC W/AUTO DIFF WBC: CPT | Performed by: FAMILY MEDICINE

## 2023-03-16 PROCEDURE — 36415 COLL VENOUS BLD VENIPUNCTURE: CPT | Performed by: FAMILY MEDICINE

## 2023-03-16 PROCEDURE — 80053 COMPREHEN METABOLIC PANEL: CPT | Performed by: FAMILY MEDICINE

## 2023-03-16 PROCEDURE — 250N000013 HC RX MED GY IP 250 OP 250 PS 637: Performed by: PSYCHIATRY & NEUROLOGY

## 2023-03-16 RX ADMIN — QUETIAPINE FUMARATE 300 MG: 300 TABLET, EXTENDED RELEASE ORAL at 21:07

## 2023-03-16 RX ADMIN — BUSPIRONE HYDROCHLORIDE 7.5 MG: 7.5 TABLET ORAL at 21:10

## 2023-03-16 RX ADMIN — IBUPROFEN 600 MG: 600 TABLET ORAL at 21:07

## 2023-03-16 RX ADMIN — BUSPIRONE HYDROCHLORIDE 7.5 MG: 7.5 TABLET ORAL at 14:20

## 2023-03-16 RX ADMIN — QUETIAPINE 100 MG: 100 TABLET ORAL at 08:58

## 2023-03-16 RX ADMIN — MELATONIN TAB 3 MG 3 MG: 3 TAB at 21:07

## 2023-03-16 RX ADMIN — CETIRIZINE HYDROCHLORIDE 10 MG: 10 TABLET, FILM COATED ORAL at 11:31

## 2023-03-16 RX ADMIN — BUSPIRONE HYDROCHLORIDE 7.5 MG: 7.5 TABLET ORAL at 08:59

## 2023-03-16 RX ADMIN — FLUTICASONE PROPIONATE 1 SPRAY: 50 SPRAY, METERED NASAL at 08:58

## 2023-03-16 ASSESSMENT — COLUMBIA-SUICIDE SEVERITY RATING SCALE - C-SSRS
2. HAVE YOU ACTUALLY HAD ANY THOUGHTS OF KILLING YOURSELF?: NO
TOTAL  NUMBER OF ABORTED OR SELF INTERRUPTED ATTEMPTS SINCE LAST CONTACT: NO
SUICIDE, SINCE LAST CONTACT: NO
TOTAL  NUMBER OF INTERRUPTED ATTEMPTS SINCE LAST CONTACT: NO
ATTEMPT SINCE LAST CONTACT: NO
6. HAVE YOU EVER DONE ANYTHING, STARTED TO DO ANYTHING, OR PREPARED TO DO ANYTHING TO END YOUR LIFE?: NO
1. SINCE LAST CONTACT, HAVE YOU WISHED YOU WERE DEAD OR WISHED YOU COULD GO TO SLEEP AND NOT WAKE UP?: NO

## 2023-03-16 ASSESSMENT — ACTIVITIES OF DAILY LIVING (ADL)
ADLS_ACUITY_SCORE: 35

## 2023-03-16 NOTE — ED NOTES
Patient slept restfully throughout the night. No sleep/behavioral concerns observed while asleep. Patient is asleep in lounge at this moment  but will be going to BEC 2 when awake. Will continue to monitor for safety.

## 2023-03-16 NOTE — ED NOTES
"[facility] Crisis Reassessment      Asha Arrieta was reassessed at the request of treatment team for the following reasons: assess for appropriate recommendations. Pt was first seen on 3/15 by Litzy Barbosa; see the initial assessment note for details.      Patient Presentation    Initial ED presentation details: Copied from Initial Assessment:  \"Patient reports ongoing conflict with the [facility] staff regarding her level of participation.  Patient admits she did not read a purple information packet upon her arrival, but has also felt that when she does speak up in groups her opinions are not valued.  Patient reports she had a doctor's appointment this morning which she forgot to tell staff about.  Patient left group to take the doctor's appointment virtually.  Patient reports the [facility] staff said she did not have an appointment because it was not showing up in [facility], but this appointment was outside of the [employer] [facility] [employer] system.  Patient reports during this verbal argument with staff, she dropped her beverage and colored pencils. Patient felt that she could have accidentally hurt herself with the colored pencils all over the ground.  Patient reports telling them she did not feel safe because of this, but reports she did not spill these pencils on purpose.  Patient reports other clients helped her  the pencils which made her feel better.  Patient reports the [facility] staff nonetheless felt she needed to be evaluated in our emergency department due to feeling unsafe.  Patient reports she wants to complete [facility], but they have been trying to kick her out since she arrived.  Patient  reports frustration that staff allegedly told her they would come back to get her but are now refusing to accept her back.\"    Current patient presentation: Patient is presenting as more symptomatic today. She presents as delusional and agitated with writer. States that at her treatment program, they were talking about " "cannibalism and were stealing her teeth. Reports that her children were at her treatment center wearing the skin of others and they needed to be harvested to cure tuberculosis. Also stated that one of her pills \"almost killed me\". Talks significantly about her children and how sexual partners have been stealing her \"fetuses\" and feels her \"Fetuses\" are after her and one fetus is responsible for her commitment. \"How are these fetuses doing all this shit\". Reports her treatment team is \"trying to turn my kids against me\". She became agitated when writer asked how many children she has stating \"why does everyone ask me that\". She eventually says \"3\" and then goes on to stay that men have taken kids while having sex with her and \"I didn't know that was possible\". She also discussed at length that her  is her son as \"I had sex with his dad and his dad is a racist bastard\". She lacks insight stating \"there is nothing wrong with my mental health\".     Changes observed since initial assessment: She appears more symptomatic with strong delusions. She is tangential and hyperverbal in interaction. She is redirectable after multiple attempts.      Risk of Harm    [place] Suicide Severity Rating Scale Full Clinical Version:3/15  Suicidal Ideation  1. Wish to be Dead (Lifetime): No  2. Non-Specific Active Suicidal Thoughts (Lifetime): No     Suicidal Behavior  Actual Attempt (Lifetime): No  Has subject engaged in non-suicidal self-injurious behavior? (Lifetime): No  C-SSRS Risk (Lifetime/Recent)  Calculated C-SSRS Risk Score (Lifetime/Recent): No Risk Indicated    [place] Suicide Severity Rating Scale Since Last Contact: 3/16  Suicidal Ideation (Since Last Contact)  1. Wish to be Dead (Since Last Contact): No  2. Non-Specific Active Suicidal Thoughts (Since Last Contact): No  Suicidal Behavior (Since Last Contact)  Actual Attempt (Since Last Contact): No  Has subject engaged in non-suicidal self-injurious behavior? " (Since Last Contact): No  Interrupted Attempts (Since Last Contact): No  Aborted or Self-Interrupted Attempt (Since Last Contact): No  Preparatory Acts or Behavior (Since Last Contact): No  Suicide (Since Last Contact): No     C-SSRS Risk (Since Last Contact)  Calculated C-SSRS Risk Score (Since Last Contact): No Risk Indicated    Validity of evaluation is not impacted by presenting factors during interview.   Comments regarding subjective versus objective responses to [place] tool: congruent.  Environmental or Psychosocial Events: unstable housing, homelessness and ongoing abuse of substances  Chronic Risk Factors: serious, persistent mental illness   Warning Signs: increasing substance use or abuse  Protective Factors: good treatment engagement, able to access care without barriers and help seeking  Interpretation of Risk Scoring, Risk Mitigation Interventions and Safety Plan:  Patient denies any current or historical suicidal ideation. Patient denies any prior suicide attempts.    Does the patient have thoughts of harming others? No    Mental Status Exam   Affect: Appropriate   Appearance: Appropriate    Attention Span/Concentration: Attentive?    Eye Contact: Intense   Fund of Knowledge: Appropriate    Language /Speech Content: Fluent   Language /Speech Volume: Loud    Language /Speech Rate/Productions: Hyperverbal    Recent Memory: Intact   Remote Memory: Intact   Mood: Irritable    Orientation to Person: Yes    Orientation to Place: Yes   Orientation to Time of Day: Yes    Orientation to Date: Yes    Situation (Do they understand why they are here?): No    Psychomotor Behavior: Agitated    Thought Content: Delusions   Thought Form: Tangential       Additional Collateral Information   Spoke with Patient's . He confirmed that they are revoking her PD and returning to [facility] is not an option. States that Patient does have delusions at her baseline but these are elevated that what he has seen at  her baseline. At her baseline, she is redirectable and can focus on conversation and this is not occurring. Reports that prior to writer calling him, Patient was cussing him out on the phone for 15 minutes. Reports she was presenting at paranoid on the phone and became more paranoid that longer the phone call went on. She historically has taken a long period of time to stabilize and does not feel she has been stable for the last few months. Discussed current delusions which are highlighted in assessment above and initial assessment. He also reported that she has been talking about being pregnant with alien babies. He feeling inpatient mental health would be appropriate and they are looking at IRTs once she is stable. Sanford reported that Patient can present as stable for short periods of time but if you talk with her for long periods her symptomology becomes for present.  : Sanford Marinelli  [phone]  Email: [email or web address]    Spoke with Jimena from [facility]. She described Patient has unpredictable and agitated. She would become verbally aggressive at times with no instances of physical aggression. Jimena has been in contact with Patient's outpatient psychiatrist who reported that baseline has been difficult to determine due to Patient being sporadic with treatment engagement and medication. Jimena reported that Patient can present as stable for short periods of time but the longer an interaction goes on, the more symptomology comes out.   Jimena can be reached at [phone] if further information is needed    Therapeutic Intervention  The following therapeutic methodologies were employed when working with the patient: Establishing rapport, Active listening and Assess dimensions of crisis. Patient response to intervention: disengaged.    Diagnosis:   1          Schizoaffective disorder, Bipolar type            F25.0                             2          Unspecified  stimulant-related disorder; Unspecified amphetamine or other stimulant-related disorder      F15.99         Clinical Substantiation of Recommendations  It is the recommendation of this clinician that pt admit to IP MH for safety and stabilization. Pt displays the following risk factors that support IP admission: she is currently displaying delusions, agitation and lack of insight into her symptomology. . Pt is unable to engage in safety planning to mitigate risk level in a non-secure setting. Lower levels of care have not been successful in mitigating risk. Due to this IP is the least restrictive option of care for pt. Pt should remain in IP until deemed safe to return to the community and engage in OP MH supports. Pt will need assistance establishing OP MH services prior to discharge.      Plan:    Disposition  Recommended disposition: Inpatient Mental Health    Admission to Inpatient Level of Care is indicated due to:     1. Patient risk of severity of behavioral health disorder is appropriate to proposed level of care as indicated by:               Imminent Risk of Harm: Command auditory hallucinations or paranoid delusions contributing to risk of suicide or self harm and Command auditory hallucinations or paranoid delusions contributing to risk of homicide or serious harm to another  And/or:  Behavioral health disorder is present and appropriate for inpatient care with both of the following:     Severe psychiatric, behavioral or other comorbid conditions are appropriate for management at inpatient mental health as indicated by at least one of the following:   ? Hallucinations; delusions; positive symptoms and Impaired impulse control, judgement, or insight    Severe dysfunction in daily living is present as indicated by at least one of the following:   ? Complete inability to maintain any appropriate aspect of personal responsibility in any adult roles     2. Inpatient mental health services are necessary to  meet patient needs and at least one of the following:  Specific condition related to admission diagnosis is present and judged likely to deteriorate in absence of treatment at proposed level of care     3. Situation and expectations are appropriate for inpatient care, as indicated by one of the following:   Need for physical restraint, seclusion, or other involuntary treatment intervention is present, Around-the-clock medical and nursing care to address symptoms and initiate intervention is required and Biopsychosocial stresses potentially contributing to clinical presentation (co morbidities) have been assessed and are absent or manageable at proposed level of care    Reviewed case and recommendations with attending provider. Attending Name: Dr. Chris Powell      Attending concurs with disposition: Yes      Patient and/or verified legal guardian concurs with disposition: Yes      Final disposition: Inpatient mental health .   Patient is on a court hold with a revoked provisional discharge through [facility]. PW is on file.    Patient cannot return to [facility]  Patient is on a court hold with a revoked provisional discharge through [facility].    Assessment Details  Total duration spent on the patient case in minutes: 1.50 hrs     CPT code(s) utilized: 77615 - Psychotherapy (with patient) - 30 (16-37*) min       Corinne Romitti, [facility], [facility]  Callback: [phone]

## 2023-03-16 NOTE — ED NOTES
Patient is observed interacting with peers in the lounge , playing cards and conversing. Patient is loud, nonchalant, blunt, rude with speech but redirectable. Patient denies all psych symptoms at this moment. She is alert and oriented x4. Safety precautions in place. Will continue to monitor

## 2023-03-16 NOTE — PROGRESS NOTES
[facility]  Adult Substance Use Disorder Program  Discharge Summary         PATIENT  Asha Arrieta   DATE OF BIRTH 1995   MRN [id number]   EVALUATION COUNSELOR [facility]   TREATMENT COUNSELOR SISSY Zhang & SISSY Hayes   REFERRAL SOURCE [facility]   ADMIT DATE 2/3/23   DATE OF LAST SESSION 3/14/23   DISCHARGE DATE 3/15/23   DISCHARGE STATUS Discharged At Staff Request     SUBSTANCE USE DISORDER DIAGNOSIS:  Methamphetamine Disorder- Severe F15.20/304.40  Cannabis Use Disorder - Severe F12.20/304.30    LAST USE DATE: 2/3/23    READMITTANCE INFORMATION: If patient wishes to readmit to [facility], after 30 days from discharge. Patient will need a full staff consult and review of mental health status prior to admission.     SERVICES PROVIDED:  Our services included assessment, treatment planning and education regarding chemical dependency, mental illness, relationships, and relapse prevention.  The patient also participated in individual therapy, group therapy, recovery oriented workshops, spiritual care counseling, recovery skills training and aftercare planning.    PRESENTING INFORMATION:  Patient attended [facility], and received a Substance Use Assessment. Patient met criteria for Moderate Substance Abuse based off the information provided. Patients paperwork was faxed to  admission, and reviewed. Pt was scheduled for admission on 2/3/23. Upon admission, patients substance use risk was increased to Severe based off the information provided by admission counselor SISSY Jiménez.       ISSUES ADDRESS IN TREATMENT:    DIMENSION 1 - ACUTE INTOXICATION/WITHDRAWAL POTENTIAL  ADMISSION RISK RATIN  DISCHARGE RISK RATIN    Patient entered into [facility] on 2/3/23. Patient reported her substances of use as Alcohol, Methamphetamine, Cannabis, and  "Cocaine. Patient reported last date of use as 23, due to entering a detox.stablizing facility. Throughout treatment patient denied any withdrawal symptoms that would interfere with full participation in treatment programming.  Patient reported prior to stopping use, her use was consistently frequent, and met Severe Use Criteria.     DIMENSION 2 - BIOMEDICAL COMPLICATIONS AND CONDITIONS  ADMISSION RISK RATIN  DISCHARGE RISK RATIN  Upon admissions patient denied any medical concerns that would interfere with full participation in treatment programming. Patient reported having a PCP. Patient reported a dx of non seasonal allergies, and recent concerns with her skin being sensitive/irritated. Patient reported plans to follow up with PCP regarding concerns. Patient was able to access medical services while in treatment.     DIMENSION 3 - EMOTIONAL, BEHAVIORAL, COGNITIVE CONDITIONS AND COMPLICATIONS  ADMISSION RISK RATING: 3  DISCHARGE RISK RATING: 3  Upon admission, patient reported feelings of depression, but denied any and all other dx related to MH. Per patients chart/collarteral provided upon intake, patient has dx of Schizoaffective Disorder and Bipolar 1.  Patient verbally denied previous dx. Patient was verbally witnessed expressing distorted/paranoid  thinking patterns from admission. Patient verbalized to staff and patients on multiple occasions concerning delusions, \" people here are being harvested for their skin\", \"someone comes in my room at night to give my TB/STD's\", and \"I have to be careful I am carrying all these people inside me, that's why I had to stop taking all my medications.\"  Patient met with staff therapist while in treatment. Staff therapist also expressed concerns regarding patients mental health being unstable, and patient needing a higher level of care for her current mental health symptoms and medication assessment. Patient was able to access her psychiatrist while in " "treatment and had a standing weekly appointment, due to staff concerns with her behavior.  Patients statements were different towards [facility] staff, verses to her Psychiatrist. Patient lacks awareness of her own distorted/delusional thinking. Patient is unable to track discussions with peers, or with staff regarding her personal care needs. Patient declined providing specific information about her life overall, \"you don't need to know, its not your business.\" When asked about her family, and if she has any children, patient responded with a different number of children and information each time. Patient verbalized many times that she was pregnant, but denied the need for a pregnancy test stating, \" I will remove them all on my own.\" Staff felt as though patients account of things, was not reliable in hopes of being able to assist patient in addressing her mental health.  Due to patient not being regulated and engaged in treatment, she did not complete treatment plans assignments or benefit, due to LP not being an appropriate setting for her. Patient completed a C-SSRS and she rated \" Low Risk..\" Prior to discharge, patient expressed feeling unsafe on LP unit. Patient was escorted to ED for safety assessment.      DIMENSION 4 - READINESS FOR CHANGE  ADMISSION RISK RATING: 3  DISCHARGE RISK RATING: 3  Upon admission, patient reported her motivation to be sober was, \"  They told me to pick a place to go, so I am here.\"  Patient was unable to identify that she had a problem with substance use. Patient would physically attend groups, but not engage or participate and receive therapeutic benefit. Patient became more disruptive as days went on, going in and out of group several times, loudly interrupting a peer in group to insert random statements, and would shuffle through her belongings, to the point that group would have to pause from the disruption.  When patient was prompted for daily check ins in group, she would either " "not respond, respond with da disoriented statement, or become defensive as to why she has to engage/participate in groups.   Upon patients last day, primary counselors tattered to address recent behavioral/MH concerns with patient. Patient was unable to converse appropriately, verbally getting louder, yelling, becoming defensive, aggressive and would not allow counselors to communicate. Patient expressed feeling unsafe, and was taken to ED for assessment. Through consult with Counseling Staff, RN, and management; patient was assessed to not return to [facility], and be discharged to the Emergency Department.     DIMENSION 5 - RELAPSE, CONTINUED USE AND CONTINUED PROBLEM POTENTIAL   ADMISSION RISK RATING: 3  DISCHARGE RISK RATIN  Patient reported LP being her first substance abuse program admission. Patient reported previous admissions to IRTS facilities and group homes, but nothing ever addressed substance use. Patient lacked knowledge of addiction and was unable to express what it looks like to her, in her life. Patient expressed her sober time has been due to being in a facility and not able to \"do her own thing.\" Patient lacks coping skills, and has not obtained the necessary prevention skills to prevent further use. Patient is at high risk of further use, without intervention.  Patients lack of stability of her mental health is a major barrier for her in hopes to retain, gain, and implement a plan or skills to keep her sober.     DIMENSION 6 - RECOVERY ENVIRONMENT  ADMISSION RISK RATIN  DISCHARGE RISK RATIN  Upon admission, patient reported being homeless. Patient reported no sober network or sober connections. Patient reported having no family contact or support. Staff was informed patient was on committment and [place] Agreement, after being in treatment for at least a week. Patient reported no probation or child protective services involvement at this time. Patient was discharged to ER, with clinical " instruction to help patient connect with CM, and offer her crisis resources as needed in conjunction with communication with patients CM team.     LIVING ARRANGEMENTS AT DISCHARGE: Patient was discharged to [facility] Emergency Dept (BEC Unit)     PROGNOSIS:  Prognosis for this patient is UNFAVORABLE at this time.     Discharge Recommendations:  -Abstain from all mood-altering chemicals unless prescribed by a licensed medical provider, and take all medications as prescribed.  -Comply with Commitment Recommendations  -Remain medication compliant  -Comply with Recommendations from [facility] Commitment Case Management.      Completed By: SISSY Zhang       CC: [facility] Case Management      This information has been disclosed to you from records protected by Federal confidentiality rules (42 CFR part 2). The Federal rules prohibit you from making any further disclosure of this information unless further disclosure is expressly permitted by the written consent of the person to whom it pertains or as otherwise permitted by 42 CFR part 2. A general authorization for the release of medical or other information is NOT sufficient for this purpose. The Federal rules restrict any use of the information to criminally investigate or prosecute any alcohol or drug abuse patient.

## 2023-03-16 NOTE — PROGRESS NOTES
Patient was irritated/agitated begining the shift. She was speaking her  concerning an issues that arise over there. She was yelling and loud very upset on the phone. Writer was able to redirect her and it was successful. She rated anxiety 5/10, depression 5/10, Patient denied pain, SI/HI/SIB. She contracted for safety. She spent most of the shift in the milieu resting. She 100 percent of her all tabitha meals. She refused her scheduled  Seroquel 100 mg at 1400. She has remain quiet and calm for the rest of the shift. No major concerns during the morning shift. Will continue to monitor for behaviors.

## 2023-03-16 NOTE — TELEPHONE ENCOUNTER
S: [facility]  DEC  Corinne calling at 11:12 AM  about a 27 year old/Female presenting with delusions     B: Pt arrived via [facility] Presenting problem, stressors: Patient placed on observation last evening, symptoms are now worse. Patient is endorsing delusions about being pregnant with aliens, seeing her children as aliens at her treatment facility. Patient believes she needed to harvest her children's skin to cure tuberculosis. Patient is paranoid in interactions and is verbally agitated. Patient is having sexual delusions that others are stealing her fetuses when she has sex, and she is seeing her kids wherever she goes. Patient believes her treatment center staff are attempting to steal her teeth and are discussing cannibalism.      Pt affect in ED: agitated  Pt Dx: Schizoaffective Disorder bipolar type, unspecified stimulant disorder  Previous IPMH hx? Yes, 2022 for 5 months with care facilities     Pt denies SI   Hx of suicide attempt? No  Pt denies SIB  Pt denies HI   Pt endorses visual hallucination  of her children     Hx of aggression/violence, sexual offences, legal concerns, or Epic care plan? describe: none  Current concerns for aggression this visit? Yes: verbally escalated   Does pt have a history of Civil Commitment? Patient on commitment with [facility] and revocation of pr  Is Pt their own guardian? Yes    Pt is prescribed medication. Is patient medication compliant? Yes  Pt endorses OP services: Psychiatrist and   CD concerns: Actively using/consuming methamphetamine abuse  Acute or chronic medical concerns: none  Does Pt present with specific needs, assistive devices, or exclusionary criteria? None    Pt is ambulatory  Pt is able to perform ADLs independently    A: Pt to be reviewed for [facility] admission. Pt is on a court hold in [facility]   Preferred placement: [facility], no TRF    COVID:Negative  Utox: Not ordered, intake to request lab    CMP: Not ordered,  intake to request lab  CBC: Not ordered, intake to request lab  HCG: Not ordered, intake to request lab     R: Patient cleared and ready for behavioral bed placement: Yes  Pt placed on IPMH worklist? Yes

## 2023-03-17 ENCOUNTER — TELEPHONE (OUTPATIENT)
Dept: BEHAVIORAL HEALTH | Facility: CLINIC | Age: 28
End: 2023-03-17
Payer: COMMERCIAL

## 2023-03-17 PROCEDURE — 250N000013 HC RX MED GY IP 250 OP 250 PS 637: Performed by: PSYCHIATRY & NEUROLOGY

## 2023-03-17 RX ADMIN — QUETIAPINE 100 MG: 100 TABLET ORAL at 15:53

## 2023-03-17 RX ADMIN — FLUTICASONE PROPIONATE 1 SPRAY: 50 SPRAY, METERED NASAL at 08:39

## 2023-03-17 RX ADMIN — MELATONIN TAB 3 MG 3 MG: 3 TAB at 22:07

## 2023-03-17 RX ADMIN — QUETIAPINE 100 MG: 100 TABLET ORAL at 08:39

## 2023-03-17 RX ADMIN — QUETIAPINE FUMARATE 300 MG: 300 TABLET, EXTENDED RELEASE ORAL at 21:56

## 2023-03-17 RX ADMIN — IBUPROFEN 600 MG: 600 TABLET ORAL at 22:06

## 2023-03-17 RX ADMIN — BUSPIRONE HYDROCHLORIDE 7.5 MG: 7.5 TABLET ORAL at 21:58

## 2023-03-17 RX ADMIN — CETIRIZINE HYDROCHLORIDE 10 MG: 10 TABLET, FILM COATED ORAL at 08:40

## 2023-03-17 RX ADMIN — BUSPIRONE HYDROCHLORIDE 7.5 MG: 7.5 TABLET ORAL at 08:40

## 2023-03-17 ASSESSMENT — ACTIVITIES OF DAILY LIVING (ADL)
ADLS_ACUITY_SCORE: 35

## 2023-03-17 NOTE — PROGRESS NOTES
Patient was cooperative this morning. She took her morning medications. She denied pain and other psych symptoms. Patient contracted for safety. She refused her scheduled 1400 medications. Patient was served with court papers this afternoon. She was irritated/agitqated. She was able to redirect her herself. She was engaged in the milieu with peers. Will continue to monitor for behaviors.

## 2023-03-17 NOTE — TELEPHONE ENCOUNTER
R: The patient is currently in the [facility] awaiting placement. Patient is on a 72HH and intent to revoke provisional discharge has been filed.     Intake evening Bed Search (Done at 4:07 PM) Facilities that have not updated their [facility] access site in the last 12 hours have been contacted for bed availability.   [facility] is posting 0 beds.   [facility] is posting 0 beds.  [facility] is posting 0 beds.  [facility] is posting 0 beds.  [facility] is posting 0 beds.  [facility] is posting 0 beds.  [facility] is posting 0 beds.  [facility] is posting 0 beds. Low acuity.    [facility] is posting 0 bed. Mixed unit 12+. Low acuity only.   [facility] is posting 0 beds. No aggression.   [facility] is posting 0 beds.   [facility] is posting 2 beds. Low acuity only. 4:04 PM Intake called and spoke to Nixon, facility at capacity.   [facility] is posting 0 beds. Low acuity only.   [facility] is posting 0 beds. 0 acute, 0 med psych, 0 mood disorder- very low acuity.   [facility] is posting 1 bed. Low acuity only. 72 hr hold required. Patient is not appropriate for open bed: commitment is exclusionary for facility  [facility] is posting 4 beds. Low acuity. 4:03 PM Intake called and spoke to Nixon, facility closed due to covid.    [facility] is posting 3 beds. Vol only, No Hx of aggression, violence, or assault. No sexual offenders. No 72 hr holds. Patient is not appropriate for open bed: patient is not voluntary, on a 72HH  [facility] is posting 3 beds. (Must have the cognitive ability to do programming. No aggressive or violent behavior or recent HX in the last 2 yrs. MH must be primary). Patient is not appropriate for open bed: patient is not appropriate for mixed unit due to sexually preoccupied delusions.   [facility] is posting 0 beds. Low acuity only. Violence and aggression capped.     [facility] is posting 0 beds. Low acuity, Neg Covid.   [facility] is posting 2 beds. Covid neg. Vol only. Combined adolescent and adult unit. No aggressive or violent behavior. No registered sex offenders. Patient is not appropriate for open bed: patient is not voluntary, on a 72HH  [facility] is posting 10 beds. No Covid needed. Call for details. Patient is not appropriate for open bed: patient is on a 72HH and [place] court ordered hold, must be placed in [place], facility in [place], [place].   [facility] is posting 2 beds. Mixed Unit (13+). Low acuity only. Patient is not appropriate for open bed: patient is not appropriate for mixed unit due to sexually preoccupied delusions.     [facility] [place] location at capacity. The patient remains on the waitlist. Intake continues to identify appropriate bed placement.

## 2023-03-17 NOTE — TELEPHONE ENCOUNTER
R: Provisional discharge revocation paperwork in [employer] 4:59PM 3/16/23 in hold/commitment paperwork folder

## 2023-03-17 NOTE — PROGRESS NOTES
Writer called and left message for patients psychiatrist regarding recent discharge from LP program.   Writer left information for a call back if needed.     SISSY Zhang

## 2023-03-17 NOTE — ED PROVIDER NOTES
[facility] ED Mental Health Handoff Note:       Brief HPI:  This is a 27 year old female signed out to me by Dr. Powell.  See initial ED Provider note for full details of the presentation. Interval history is pertinent for Extended Care DEC involvement due to Ed boarding. Patient's revoked commitment paperwork is available and she now is under a court hold for admission.    Home meds reviewed and ordered/administered: Yes    Medically stable for inpatient mental health admission: Yes.    Evaluated by mental health: Yes. The recommendation is for inpatient mental health treatment. Bed search in process    Safety concerns: At the time I received sign out, there were no safety concerns.    Hold Status:  Active Orders   N/A       Exam:   Patient Vitals for the past 24 hrs:   BP Temp Temp src Pulse Resp SpO2   03/17/23 0944 137/74 -- -- 111 16 98 %   03/16/23 2110 (!) 142/85 97.7  F (36.5  C) Oral 102 -- 99 %       ED Course:    Medications   QUEtiapine (SEROquel) tablet 100 mg (100 mg Oral $Given 3/17/23 0839)   fluticasone (FLONASE) 50 MCG/ACT spray 1 spray (1 spray Both Nostrils $Given 3/17/23 0839)   ibuprofen (ADVIL/MOTRIN) tablet 600 mg (600 mg Oral $Given 3/16/23 2107)   melatonin tablet 3 mg (3 mg Oral $Given 3/16/23 2107)   QUEtiapine ER (SEROquel XR) 24 hr tablet 300 mg (300 mg Oral $Given 3/16/23 2107)   busPIRone (BUSPAR) tablet 7.5 mg (7.5 mg Oral Not Given 3/17/23 1343)   cetirizine (zyrTEC) tablet 10 mg (10 mg Oral $Given 3/17/23 0840)   nicotine (NICORETTE) gum 2 mg (2 mg Buccal $Given 3/15/23 1050)   busPIRone (BUSPAR) tablet 7.5 mg (7.5 mg Oral $Given 3/15/23 1151)            There were no significant events during my shift.      Impression:    ICD-10-CM    1. Schizoaffective disorder, unspecified type (H)  F25.9       2. Chemical dependency (H)  F19.20           Plan:    1. Awaiting inpatient mental health admission/transfer.      RESULTS:   Results for orders placed or performed during  the hospital encounter of 03/15/23 (from the past 24 hour(s))   CBC with platelets differential     Status: Abnormal    Collection Time: 03/16/23  6:12 PM    Narrative    The following orders were created for panel order CBC with platelets differential.  Procedure                               Abnormality         Status                     ---------                               -----------         ------                     CBC with platelets and d...[[id number]]  Abnormal            Final result                 Please view results for these tests on the individual orders.   Comprehensive metabolic panel     Status: Abnormal    Collection Time: 03/16/23  6:12 PM   Result Value Ref Range    Sodium 136 136 - 145 mmol/L    Potassium 4.4 3.4 - 5.3 mmol/L    Chloride 99 98 - 107 mmol/L    Carbon Dioxide (CO2) 26 22 - 29 mmol/L    Anion Gap 11 7 - 15 mmol/L    Urea Nitrogen 9.1 6.0 - 20.0 mg/dL    Creatinine 0.70 0.51 - 0.95 mg/dL    Calcium 9.8 8.6 - 10.0 mg/dL    Glucose 251 (H) 70 - 99 mg/dL    Alkaline Phosphatase 140 (H) 35 - 104 U/L    AST 23 10 - 35 U/L    ALT 36 (H) 10 - 35 U/L    Protein Total 7.0 6.4 - 8.3 g/dL    Albumin 4.0 3.5 - 5.2 g/dL    Bilirubin Total 0.2 <=1.2 mg/dL    GFR Estimate >90 >60 mL/min/1.73m2   CBC with platelets and differential     Status: Abnormal    Collection Time: 03/16/23  6:12 PM   Result Value Ref Range    WBC Count 9.9 4.0 - 11.0 10e3/uL    RBC Count 4.68 3.80 - 5.20 10e6/uL    Hemoglobin 11.9 11.7 - 15.7 g/dL    Hematocrit 38.3 35.0 - 47.0 %    MCV 82 78 - 100 fL    MCH 25.4 (L) 26.5 - 33.0 pg    MCHC 31.1 (L) 31.5 - 36.5 g/dL    RDW 14.5 10.0 - 15.0 %    Platelet Count 260 150 - 450 10e3/uL    % Neutrophils 70 %    % Lymphocytes 22 %    % Monocytes 5 %    % Eosinophils 2 %    % Basophils 1 %    % Immature Granulocytes 0 %    NRBCs per 100 WBC 0 <1 /100    Absolute Neutrophils 6.9 1.6 - 8.3 10e3/uL    Absolute Lymphocytes 2.2 0.8 - 5.3 10e3/uL    Absolute Monocytes 0.5 0.0 - 1.3  10e3/uL    Absolute Eosinophils 0.2 0.0 - 0.7 10e3/uL    Absolute Basophils 0.1 0.0 - 0.2 10e3/uL    Absolute Immature Granulocytes 0.0 <=0.4 10e3/uL    Absolute NRBCs 0.0 10e3/uL             MD Edwin Lee Dung Hoang, MD  03/17/23 9702

## 2023-03-17 NOTE — ED NOTES
Triage & Transition Services, Extended Care    Client Name: Asha Arrieta    Date: March 17, 2023  Service Type:  Group Therapy  Session Start Time:  1110    Session End Time: 1145  Session Length: 35  Site Location: [facility]  Attendees: Patient and other group members  Facilitator: Writer     Topic:   Love Language    Intervention:    Group process: support, challenge, affirm, psycho-education.     Response:  Patient did participate in group. Behavior in group was appropriate. Patient shared minimally.       Nancy Al LMFT

## 2023-03-17 NOTE — TELEPHONE ENCOUNTER
Updated Bed Search @10:00 PM   Per chart review, intake can look in the state for placement     [facility] has 0 appropriate beds available. Phone: [phone]  [facility] posting 0 available beds. Phone: [phone]  [facility] posting 0 available beds. Phone: [phone]  [facility] posting 0 available beds. Phone: [phone]  [facility] posting 0 available beds. Phone: [phone]  [facility] posting 0 available beds. Phone:[phone]  [facility] posting 0 available beds. Phone: [phone]. Negative covid required.     [facility] posting 0 available beds. Phone: [phone] Negative covid required. Low acuity only.   [facility] posting 0 available beds. Phone: [phone]. Need negative covid. Mixed unit 12+, low acuity  [facility] posting 0 available beds. Phone: [phone]. No aggression. Negative covid required.   [facility] posting 0 available beds. Phone: [phone]  [facility] posting 0 available beds. Phone: [phone]. Covid negative.   [place] posting 0 available beds. Phone: [phone]. No current aggression. Negative covid required.   [facility] posting 0 available beds. Phone:[phone]  [facility] posting 2 available beds. Phone:[phone]  [facility] posting 0 available beds. Phone: [phone]    [facility] posting 3 available beds. Phone: [phone]. Voluntary patients only. Negative covid required.   [facility] posting 3 available beds. Phone: [phone]. No aggression, neg covid, low acuity, no CD  [facility] posting 0 available beds. Phone: [phone]  [facility] posting 0 available beds. Phone: [phone]. Low acuity only. Negative covid required.   [facility] posting 2 available beds. Phone: [phone]. Covid negative, no CD, no aggression, NO 72HH  [facility] posting 1 available beds. Phone: [phone]  [facility] posting 2 available beds. Phone: [phone]    ([place], [place]) [facility] posting 10 available beds.  Phone: [phone]    Due to Pt acuity, not able to review for above available beds.   Pt remains on work list pending appropriate bed placement.

## 2023-03-17 NOTE — ED NOTES
Patient was observed interacting with peers and watching movie  in the lounge at the start of the shift . Patient continues to be tense, loud, demanding, blunt   and condescending during assessment. Patient refused to communicate with this writer and requested to sleep after taking her medications. Patient is resting on the bean bag in the milieu. Will continue to monitor for safety and try again later.

## 2023-03-17 NOTE — TELEPHONE ENCOUNTER
0507 Bed Search Update:    [facility]- 3/16 at 2027 Website updated to reflect there are no beds available.  [facility], [facility], [facility])-Website updated 3/17 at 0302 to reflect there are no beds available.  Check back after 1000  [facility]1 unit reporting they are at capacity.  Check back in AM.  [facility]-Website updated 3/17 at 0001 to reflect there are no beds available.  [facility]-Committed pts only.  Long wait list  [facility]2 Voicemail updated to reflect there are no beds   [facility] ([place], [place], [place], [place], [place], [place])- Committed pts only.  Long wait list  [facility]- Website updated 3/16 at 2338 to reflect there are no beds available.  Meets exclusionary criteria.  Low acuity only.  Mixed unit adol/adult/say  [facility] ([place], [place], [place])-Website updated 3/16 and 3/17 at 0316 to reflect they are at capacity.  [facility]- Meets exclusionary criteria.  Voluntary/[place] only. No hx violence or sexual assault.  [facility]-Meets exclusionary criteria.  No recent hx violence/aggression. Must be able to program in groups.  [facility]- Website updated 3/17 at 0024 to reflect there are no beds available.  Meets exclusionary criteria. Low acuity only.  [facility]- 0325 unit staff reporting facility is on divert.  [facility]-Low acuity beds and mixed milieu (adol/adults).  Pt and/or guardian must arrange their own transportation upon discharge.     Remains on wait list.

## 2023-03-17 NOTE — ED PROVIDER NOTES
[facility] ED Mental Health Handoff Note:       Brief HPI:  This is a 27 year old female signed out to me.  See initial ED Provider note for full details of the presentation. Interval history is pertinent for continued signs of psychosis and delusional thought disorder with realization that she has a revoked provisional discharge at this time..    Home meds reviewed and ordered/administered: Yes    Medically stable for inpatient mental health admission: Yes.    Evaluated by mental health: Yes. The recommendation is for inpatient mental health treatment. Bed search in process    Safety concerns: At the time I received sign out, there were no safety concerns.    Hold Status:  Active Orders   N/A           Exam:   Patient Vitals for the past 24 hrs:   BP Temp Temp src Pulse SpO2   03/16/23 2110 (!) 142/85 97.7  F (36.5  C) Oral 102 99 %           ED Course:    Medications   QUEtiapine (SEROquel) tablet 100 mg (100 mg Oral Not Given 3/16/23 1453)   fluticasone (FLONASE) 50 MCG/ACT spray 1 spray (1 spray Both Nostrils $Given 3/16/23 0858)   ibuprofen (ADVIL/MOTRIN) tablet 600 mg (600 mg Oral $Given 3/16/23 2107)   melatonin tablet 3 mg (3 mg Oral $Given 3/16/23 2107)   QUEtiapine ER (SEROquel XR) 24 hr tablet 300 mg (300 mg Oral $Given 3/16/23 2107)   busPIRone (BUSPAR) tablet 7.5 mg (7.5 mg Oral $Given 3/16/23 2110)   cetirizine (zyrTEC) tablet 10 mg (10 mg Oral $Given 3/16/23 1131)   nicotine (NICORETTE) gum 2 mg (2 mg Buccal $Given 3/15/23 1050)   busPIRone (BUSPAR) tablet 7.5 mg (7.5 mg Oral $Given 3/15/23 1151)            There were no significant events during my shift.    Patient was signed out to the oncoming provider, Dr. Raines      Impression:    ICD-10-CM    1. Schizoaffective disorder, unspecified type (H)  F25.9       2. Chemical dependency (H)  F19.20           Plan:    1. Awaiting mental health evaluation/recommendations.      RESULTS:   Results for orders placed or performed during the hospital  encounter of 03/15/23 (from the past 24 hour(s))   CBC with platelets differential     Status: Abnormal    Collection Time: 03/16/23  6:12 PM    Narrative    The following orders were created for panel order CBC with platelets differential.  Procedure                               Abnormality         Status                     ---------                               -----------         ------                     CBC with platelets and d...[[id number]]  Abnormal            Final result                 Please view results for these tests on the individual orders.   Comprehensive metabolic panel     Status: Abnormal    Collection Time: 03/16/23  6:12 PM   Result Value Ref Range    Sodium 136 136 - 145 mmol/L    Potassium 4.4 3.4 - 5.3 mmol/L    Chloride 99 98 - 107 mmol/L    Carbon Dioxide (CO2) 26 22 - 29 mmol/L    Anion Gap 11 7 - 15 mmol/L    Urea Nitrogen 9.1 6.0 - 20.0 mg/dL    Creatinine 0.70 0.51 - 0.95 mg/dL    Calcium 9.8 8.6 - 10.0 mg/dL    Glucose 251 (H) 70 - 99 mg/dL    Alkaline Phosphatase 140 (H) 35 - 104 U/L    AST 23 10 - 35 U/L    ALT 36 (H) 10 - 35 U/L    Protein Total 7.0 6.4 - 8.3 g/dL    Albumin 4.0 3.5 - 5.2 g/dL    Bilirubin Total 0.2 <=1.2 mg/dL    GFR Estimate >90 >60 mL/min/1.73m2   CBC with platelets and differential     Status: Abnormal    Collection Time: 03/16/23  6:12 PM   Result Value Ref Range    WBC Count 9.9 4.0 - 11.0 10e3/uL    RBC Count 4.68 3.80 - 5.20 10e6/uL    Hemoglobin 11.9 11.7 - 15.7 g/dL    Hematocrit 38.3 35.0 - 47.0 %    MCV 82 78 - 100 fL    MCH 25.4 (L) 26.5 - 33.0 pg    MCHC 31.1 (L) 31.5 - 36.5 g/dL    RDW 14.5 10.0 - 15.0 %    Platelet Count 260 150 - 450 10e3/uL    % Neutrophils 70 %    % Lymphocytes 22 %    % Monocytes 5 %    % Eosinophils 2 %    % Basophils 1 %    % Immature Granulocytes 0 %    NRBCs per 100 WBC 0 <1 /100    Absolute Neutrophils 6.9 1.6 - 8.3 10e3/uL    Absolute Lymphocytes 2.2 0.8 - 5.3 10e3/uL    Absolute Monocytes 0.5 0.0 - 1.3 10e3/uL     Absolute Eosinophils 0.2 0.0 - 0.7 10e3/uL    Absolute Basophils 0.1 0.0 - 0.2 10e3/uL    Absolute Immature Granulocytes 0.0 <=0.4 10e3/uL    Absolute NRBCs 0.0 10e3/uL       Patient has a revoked provisional discharge.      MD Sherry Vee Eric Girard, MD  03/16/23 0526

## 2023-03-17 NOTE — ED NOTES
Writer received report from previous shift and took over patient care. Routine rounds performed. Writer introduced self to patient. Patient is currently sitting in the lounge area calm and cooperative. Affect is flat No concerns at this time. Staff will continue to monitor.

## 2023-03-17 NOTE — ED NOTES
Patient slept restfully throughout the night in the bean bag. No sleep/behavioral concerns while asleep. Patient is still asleep at this moment in the lounge. Will continue to monitor for safety.

## 2023-03-17 NOTE — ADDENDUM NOTE
Encounter addended by: Lashanda Paz LADC on: 3/17/2023 8:17 AM   Actions taken: Clinical Note Signed

## 2023-03-17 NOTE — ED NOTES
Triage & Transition Services, Extended Care     Asha Arrieta  March 17, 2023    Asha is followed related to Long wait time for admission: 51+ hours in ED. Please see initial DEC Crisis Assessment completed for complete assessment information. Medical record is reviewed.  While patient is in the ED, care team is working towards Learn and Demonstrate at [facility] [employer] Focused on Crisis Stabilization.     Conducted check in with Patient. She presented similar to yesterday. Delusional and agitated. Continues to endorse thoughts of her children being at her previous treatment center and her  being her son. Her mood was intense with writer accompanied by intense eye contact.  She was verbally and physically appropriate.     Met with Patient on a second occasions as she had questions regarding her court paperwork she was served regarding her PD being revoked. Reports she wants to appeal this decision. Encouraged her call the 's office or her  to inquire about steps to do so. She did not like writer's response as she did want to call either of these numbers. Reported she wanted to talk with her 's supervisor. Writer informed Patient that writer did not have this information but she could call her  and request this information. She then ended interaction by walking away from writer.     Plan:  Inpatient Mental Health:     Plan for Care reviewed with Assigned Medical Provider? Yes. Provider, Dr. Mai, response: agreeable.     Extended Care will follow and meet with patient/family/care team as able or requested.     Corinne Romitti, [facility], Extended Care   [phone]

## 2023-03-18 ENCOUNTER — TELEPHONE (OUTPATIENT)
Dept: BEHAVIORAL HEALTH | Facility: CLINIC | Age: 28
End: 2023-03-18
Payer: COMMERCIAL

## 2023-03-18 PROCEDURE — 250N000013 HC RX MED GY IP 250 OP 250 PS 637: Performed by: PSYCHIATRY & NEUROLOGY

## 2023-03-18 RX ADMIN — BUSPIRONE HYDROCHLORIDE 7.5 MG: 7.5 TABLET ORAL at 21:26

## 2023-03-18 RX ADMIN — QUETIAPINE 100 MG: 100 TABLET ORAL at 09:41

## 2023-03-18 RX ADMIN — QUETIAPINE FUMARATE 300 MG: 300 TABLET, EXTENDED RELEASE ORAL at 21:26

## 2023-03-18 RX ADMIN — QUETIAPINE 100 MG: 100 TABLET ORAL at 15:33

## 2023-03-18 RX ADMIN — BUSPIRONE HYDROCHLORIDE 7.5 MG: 7.5 TABLET ORAL at 13:22

## 2023-03-18 RX ADMIN — BUSPIRONE HYDROCHLORIDE 7.5 MG: 7.5 TABLET ORAL at 09:41

## 2023-03-18 ASSESSMENT — ACTIVITIES OF DAILY LIVING (ADL)
ADLS_ACUITY_SCORE: 35

## 2023-03-18 NOTE — ED NOTES
Patient has been sleeping since she went to bed. No sign of respiratory distress or discomfort  noted. Staff will continue to monitor for safety.

## 2023-03-18 NOTE — PROGRESS NOTES
"  Triage & Transition Services, Extended Care     Care Coordination Progress Note    Patient: Asha goes by \"Asha,\" uses she/her pronouns  Date of Service: March 18, 2023  Site of Service: [facility]    Individuals Present: Asha & Onelia Arreola    Session start: 4:15P  Session end: 4:20P  Session duration in minutes: 5min  Patient was seen in-person.     Asha is being followed by Extended Care. Please see full DEC Assessment done by Litzy Barbosa on 03/15/2023 for further detail.     Details of Therapeutic Interaction:   Pt was watching a movie when writer approached. Writer introduced self and role. Pt stated that they were upset that her socks were misplaced during her transfer to the ED from [facility]. Pt stated that she is unaware of when it may have been misplaced. Pt did not make any eye contact during this visit. Pt had somewhat delayed responses. Pt was minimally engaged and often shrugged her shoulders in response. Pt eventually stated \"I'm fine\".     Therapeutic Goals Addressed During Session:   Build Rapport    Plan:  Recommended by [facility] for Inpatient Mental Health: Patient is on a court hold with a revoked provisional discharge through [facility]. PW is on file.    Onelia Arreola 3/18/2023 4:45 PM  Extended Care Coordinator- [phone]            "

## 2023-03-18 NOTE — TELEPHONE ENCOUNTER
Preventive Health Recommendations:       Male Ages 65 and over    Yearly exam:             See your health care provider every year in order to  o   Review health changes.   o   Discuss preventive care.    o   Review your medicines if your doctor has prescribed any.    Talk with your health care provider about whether you should have a test to screen for prostate cancer (PSA).    Every 3 years, have a diabetes test (fasting glucose). If you are at risk for diabetes, you should have this test more often.    Every 5 years, have a cholesterol test. Have this test more often if you are at risk for high cholesterol or heart disease.     Every 10 years, have a colonoscopy. Or, have a yearly FIT test (stool test). These exams will check for colon cancer.    Talk to with your health care provider about screening for Abdominal Aortic Aneurysm if you have a family history of AAA or have a history of smoking.  Shots:     Get a flu shot each year.     Get a tetanus shot every 10 years.     Talk to your doctor about your pneumonia vaccines. There are now two you should receive - Pneumovax (PPSV 23) and Prevnar (PCV 13).    Talk to your doctor about a shingles vaccine.     Talk to your doctor about the hepatitis B vaccine.  Nutrition:     Eat at least 5 servings of fruits and vegetables each day.     Eat whole-grain bread, whole-wheat pasta and brown rice instead of white grains and rice.     Talk to your doctor about Calcium and Vitamin D.   Lifestyle    Exercise for at least 150 minutes a week (30 minutes a day, 5 days a week). This will help you control your weight and prevent disease.     Limit alcohol to one drink per day.     No smoking.     Wear sunscreen to prevent skin cancer.     See your dentist every six months for an exam and cleaning.     See your eye doctor every 1 to 2 years to screen for conditions such as glaucoma, macular degeneration and cataracts.   [facility] Access Inpatient Bed Call Log 3/18/2023 9:04am       Intake has called facilities that have not updated their bed status within the last 12 hours.        Adults:         [facility] is posting 0 beds.      [facility] is posting 0 [phone]      [place] is posting 0 beds. [phone]     [facility] is posting 0 beds. [phone] - 9:10am per Kim capped until Monday.     [facility] is posting 0 beds. [phone]     [facility] is posting 0 beds. [phone]     [facility] is posting 0 beds. [phone]     [facility] is posting 0 beds. [phone]     [facility] is posting 0 beds. [phone]         [facility] is posting 0 beds. Mixed unit 12+. Low acuity only.  [phone] - 9:14am Per Marta at capacity.     [facility] is positing 0 beds. No aggression.  [phone]     [facility] is posting 0 beds. [phone]     [facility] is posting 1 beds. Low acuity only. [phone]     [facility] is posting 0 beds. [phone]     [facility] is posting 0 beds. Low acuity. [phone]     [facility] is posting 0 bed. 72 hr hold preferred. [phone] - 3:56pm call for review     [place] is posting 7  beds. [phone]      [facility] is posting 3 beds. Vol only, No Hx of aggression, violence, or assault. No sexual offenders. No 72 hr holds. [phone] - 9:17am Per Seda call back in an hour.         [facility] is posting 3 beds. (Must have the cognitive ability to do programming. No aggressive or violent behavior or recent HX in the last 2 yrs. MH must be primary.) [phone]      is posting 0 beds. Low acuity only. Violence and aggression capped. [phone]     [facility] is posting 0 beds. Low acuity, Neg Covid. [phone]       [facility] is posting 2 beds. Covid neg. Vol only. Combined adolescent and adult unit. No aggressive or  violent behavior. No registered sex offenders. [phone]  - 9:20am Intake called, no answer.     [place] posting 01beds. Negative covid.      [facility] is posting 10 beds. Call for details. [phone] @3:57pm Beds available     [facility] is posting 2 beds. [phone] - 9:23am Intake called. Left a voicemail.   Pt remains on work list pending appropriate bed availability.

## 2023-03-18 NOTE — ED NOTES
Patient slept all through the night with no sign respiratory distress or discomfort noted or observed. Patient is still sleeping at this time. Staff will continue to monitor for safety.

## 2023-03-18 NOTE — TELEPHONE ENCOUNTER
0229 Bed Search Update:    [facility]- 3/17 at 2359 Website updated to reflect there are no beds available.  [facility] [place] [place], [place], [facility])-Website updated 3/17 at 2122 to reflect there are no beds available.  Check back after 1000  [facility]-0007 unit reporting they are at capacity.  Check back in AM.  [facility]-Website updated 3/18 at 0004 to reflect there are no beds available.  [facility]-Committed pts only.  Long wait list  [facility]-Website updated 3/17 at 2200 to reflect reflect there are no beds   [facility] ([place], [place], [place], [place], [place], [place])- Committed pts only.  Long wait list  [facility]-Meets exclusionary criteria.  Low acuity only.  Mixed unit adol/adult/say  [facility] ([place], [place], [place])-Meets exclusionary criteria for available beds.  [facility]- Meets exclusionary criteria.  [place] only. No hx violence or sexual assault.  [facility]-Meets exclusionary criteria.  No recent hx violence/aggression. Must be able to program in groups.  [facility]- Meets exclusionary criteria. No beds available.  Low acuity only.  [facility]- 0215 unit staff reporting facility is on divert.  [facility]-Low acuity beds and mixed milieu (adol/adults).  Pt and/or guardian must arrange their own transportation upon discharge.     Remains on wait list.

## 2023-03-18 NOTE — PROGRESS NOTES
Patient was and cooperative. She was medication compliant. She contracted for safety. Patient rated anxiety 2/10, depression 3/10, denied auditory and visual hallucinations. She ate 100 percent of her meals. She spent most of the shift in the milieu. Mood is flat. No inappropriate behaviors noted during the shift. Will continue to monitor for behaviors.

## 2023-03-18 NOTE — ED NOTES
"Patient spent the entire evening shift in the [facility] area watching TV. She had little interaction with peers. Patient has a flat. Patient told writer she was upset earlier during the day shift  but that she was okay for the rest of the evening shift. Patient denies SI/HI/SIB and denies hallucinations. When patient  was asked if she is safe her and if she is edwin for safety her response was \" I am safe\". She was not clear  as to whether she is edwin for safety or not. Patient requested for prn Ibuprofen for shoulder pain with a rating of 8/10.  Staff will continue to monitor.  "

## 2023-03-18 NOTE — ED NOTES
[facility] ED Mental Health Handoff Note:       Brief HPI:  This is a 27 year old female signed out to me by Dr. Pena.  See initial ED Provider note for full details of the presentation. Asha Arrieta is a 27 year old female with a history of schizoaffective disorder who was discharged from [facility], I believe for aggressive behavior.  She is awaiting inpatient mental health bed as a revoked commitment    Home meds reviewed and ordered/administered: Yes    Medically stable for inpatient mental health admission: Yes.    Evaluated by mental health: Yes. The recommendation is for inpatient mental health treatment. Bed search in process    Safety concerns: At the time I received sign out, there were no safety concerns.    Hold Status:  Active Orders   N/A            Exam:   Patient Vitals for the past 24 hrs:   BP Temp Temp src Pulse Resp SpO2   03/17/23 2202 114/79 98.3  F (36.8  C) Oral 106 18 99 %           ED Course:    Medications   QUEtiapine (SEROquel) tablet 100 mg (100 mg Oral $Given 3/18/23 0941)   fluticasone (FLONASE) 50 MCG/ACT spray 1 spray (1 spray Both Nostrils Not Given 3/18/23 0942)   ibuprofen (ADVIL/MOTRIN) tablet 600 mg (600 mg Oral $Given 3/17/23 2206)   melatonin tablet 3 mg (3 mg Oral $Given 3/17/23 2207)   QUEtiapine ER (SEROquel XR) 24 hr tablet 300 mg (300 mg Oral $Given 3/17/23 2156)   busPIRone (BUSPAR) tablet 7.5 mg (7.5 mg Oral $Given 3/18/23 1322)   cetirizine (zyrTEC) tablet 10 mg (10 mg Oral Not Given 3/18/23 0942)   nicotine (NICORETTE) gum 2 mg (2 mg Buccal $Given 3/15/23 1050)   busPIRone (BUSPAR) tablet 7.5 mg (7.5 mg Oral $Given 3/15/23 1151)            There were no significant events during my shift.    Patient was signed out to the oncoming provider, Dr. Stone      Impression:    ICD-10-CM    1. Schizoaffective disorder, unspecified type (H)  F25.9       2. Chemical dependency (H)  F19.20           Plan:    1. Awaiting inpatient mental health  admission/transfer.      RESULTS:   No results found for this visit on 03/15/23 (from the past 24 hour(s)).          MD Sindi Leija, Kellee GUARDADO MD  03/18/23 1555

## 2023-03-18 NOTE — TELEPHONE ENCOUNTER
[facility] Access Inpatient Bed Call Log 3/17/2023 9:15PM   Intake has called facilities that have not updated their bed status within the last 12 hours.      Adults:     [facility] is posting 0 beds.    [facility] is posting 0 [phone]    [place] is posting 0 beds. [phone]   [facility] is posting 0 beds. [phone]    [facility] is posting 0 beds. [phone]   [facility] is posting 0 beds. [phone]   [facility] is posting 0 beds. [phone]   [facility] is posting 0 beds. [phone]   [facility] is posting 0 beds. [phone]     [facility] is posting 0 beds. Mixed unit 12+. Low acuity only.  [phone]   [facility] is positing 0 beds. No aggression.  [phone]   [facility] is posting 0 beds. [phone]   [facility] is posting 2 beds. Low acuity only. [phone]   [facility] is posting 0 beds. [phone]   [facility] is posting 0 beds. Low acuity. [phone]   [facility] is posting 1 bed. 72 hr hold preferred. [phone] 3:56pm call for review   [place] is posting 4 beds. [phone]    [facility] is posting 3 beds. Vol only, No Hx of aggression, violence, or assault. No sexual offenders. No 72 hr holds. [phone]     [facility] is posting 3 beds. (Must have the cognitive ability to do programming. No aggressive or violent behavior or recent HX in the last 2 yrs.  must be primary.) [phone]   [facility] is posting 0 beds. Low acuity only. Violence and aggression capped. [phone]   [facility] is posting 0 beds. Low acuity, Neg Covid. [phone]     [facility] is posting 2 beds. Covid neg. Vol only. Combined adolescent and adult unit. No aggressive or violent behavior. No registered sex offenders. [phone] 8:18a Per Nurys @ [facility].    [place] posting 01beds. Negative covid.    [place] is  posting 10 beds. Call for details. [phone] @3:57pm Beds available   [facility] is posting 2 beds. [phone]   Pt remains on work list pending appropriate bed availability.

## 2023-03-18 NOTE — TELEPHONE ENCOUNTER
[facility] Access Inpatient Bed Call Log 3/17/2023 9:15PM       Intake has called facilities that have not updated their bed status within the last 12 hours.        Adults:       [facility] is posting 0 beds.    [facility] is posting 0 [phone]    [place] is posting 0 beds. [phone]   [facility] is posting 0 beds. [phone]    [facility] is posting 0 beds. [phone]   [facility] is posting 0 beds. [phone]   [facility] is posting 0 beds. [phone]   [facility] is posting 0 beds. [phone]   [facility] is posting 0 beds. [phone]       [facility] is posting 0 beds. Mixed unit 12+. Low acuity only.  [phone]   [facility] is positing 0 beds. No aggression.  [phone]   [facility] is posting 0 beds. [phone]   [facility] is posting 2 beds. Low acuity only. [phone]   [facility] is posting 0 beds. [phone]   [facility] is posting 0 beds. Low acuity. [phone]   [facility] is posting 1 bed. 72 hr hold preferred. [phone] 3:56pm call for review  [place] is posting 4 beds. [phone]    [facility] is posting 3 beds. Vol only, No Hx of aggression, violence, or assault. No sexual offenders. No 72 hr holds. [phone]       [facility] is posting 3 beds. (Must have the cognitive ability to do programming. No aggressive or violent behavior or recent HX in the last 2 yrs.  must be primary.) [phone]   [facility] is posting 0 beds. Low acuity only. Violence and aggression capped. [phone]   [facility] is posting 0 beds. Low acuity, Neg Covid. [phone]     [facility] is posting 2 beds. Covid neg. Vol only. Combined adolescent and adult unit. No aggressive or violent behavior. No registered sex offenders. [phone] 8:18a Per Nurys @ [facility]. [place] posting 01beds. Negative covid.    [facility].  [place] is posting 10 beds. Call for details. [phone] @3:57pm Beds available   [facility] is posting 2 beds. [phone]     Pt remains on work list pending appropriate bed availability.

## 2023-03-18 NOTE — TELEPHONE ENCOUNTER
[place]: [place]  Access Inpatient Bed Call Log 3/18/23 6:15PM     Adults:      [place] is at capacity.  [facility] is posting 0 beds.    [facility] is posting 0 beds. Negative covid.     [facility] is posting 0 beds.[facility] are posting 0 beds.    [facility] has 0 beds.Extensive wait List for committed patients.    [facility] is posting 0 bed.       [facility] is posting 0 beds-LOW acuity ONLY. Mixed unit 12+. Negative covid  [facility] has 0 bed. No aggression.  Negative Covid.    [facility] is posting 0 beds.  Negative covid.     [facility] 2 beds in [place], and 7 beds  in [place]  Low acuity only.  Call [phone] is posting 1 beds. Low acuity. 72 HH hold preferred. - [phone]  [facility]  3 Beds -     Negative covid. Vol only, No history of aggression, violence, or assault. No sexual offenders. No 72 HH holds.   [phone]    [facility] is posting 4 Negative covid. (Must have the cognitive ability to do programming. No aggressive or violent behavior or recent HX in the last 2 yrs. MH must be primary.) Always low acuity.  [phone]  [facility] has 0 beds. Negative Covid. Low acuity only. Violence and aggression capped.    [phone]  [facility] is posting possible 0 beds. Low acuity, Negative Covid.   [facility] is posting 2 beds. Covid Negative. Vol only. Combined adolescent and adult unit. No aggressive or violent behavior. No registered sex offenders.   [phone]  [place] posting 2 beds. Negative covid..   At capacity.  [facility] posting 2 beds. Negative covid. (No lines, drains, or tubes, oxygen, CPAP, IV, etc.).   [facility] is posting 2 beds. No covid test required.  [phone]    Pt remains on work list pending appropriate bed availability.

## 2023-03-19 ENCOUNTER — TELEPHONE (OUTPATIENT)
Dept: BEHAVIORAL HEALTH | Facility: CLINIC | Age: 28
End: 2023-03-19
Payer: COMMERCIAL

## 2023-03-19 PROCEDURE — 250N000013 HC RX MED GY IP 250 OP 250 PS 637: Performed by: PSYCHIATRY & NEUROLOGY

## 2023-03-19 RX ADMIN — BUSPIRONE HYDROCHLORIDE 7.5 MG: 7.5 TABLET ORAL at 22:02

## 2023-03-19 RX ADMIN — BUSPIRONE HYDROCHLORIDE 7.5 MG: 7.5 TABLET ORAL at 09:34

## 2023-03-19 RX ADMIN — QUETIAPINE FUMARATE 300 MG: 300 TABLET, EXTENDED RELEASE ORAL at 22:03

## 2023-03-19 RX ADMIN — QUETIAPINE 100 MG: 100 TABLET ORAL at 18:15

## 2023-03-19 RX ADMIN — QUETIAPINE 100 MG: 100 TABLET ORAL at 09:33

## 2023-03-19 RX ADMIN — CETIRIZINE HYDROCHLORIDE 10 MG: 10 TABLET, FILM COATED ORAL at 09:33

## 2023-03-19 ASSESSMENT — ACTIVITIES OF DAILY LIVING (ADL)
ADLS_ACUITY_SCORE: 35

## 2023-03-19 NOTE — TELEPHONE ENCOUNTER
[facility] Access Inpatient Bed Call Log 3/19/2023 7:29 AM     Intake has called facilities that have not updated their bed status within the last 12 hours.       Adults:        [facility] is posting 0 beds.      [facility] is posting 0 [phone]      [place] is posting 0 beds. [phone]     [facility] is posting 0 beds. [phone]     [facility] is posting 0 beds. [phone]     [facility] is posting 0 beds. [phone]     [facility] is posting 0 beds. [phone]     [facility] is posting 0 beds. [phone]     [facility] is posting 0 beds. [phone]          [facility] is posting 0 beds. Mixed unit 12+. Low acuity only.  [phone]     [facility] is positing 0 beds. No aggression.  [phone]     [facility] is posting 0 beds. [phone]     [facility] is posting 2 beds. Low acuity only. [phone]     [facility] is posting 0 beds. [phone]     [facility] is posting 0 beds. Low acuity. [phone]     [facility] is posting 1 beds. 72 hr hold preferred. [phone]     [place] is posting 3 beds. [phone]     [facility] is posting 5 beds. Vol only, No Hx of aggression, violence, or assault. No sexual offenders. No 72 hr holds. [phone]          [facility] is posting 4 beds. (Must have the cognitive ability to do programming. No aggressive or violent behavior or recent HX in the last 2 yrs. MH must be primary.) [phone]     [facility] is posting 0 beds. Low acuity only. Violence and aggression capped. [phone]     [facility] is posting 0 beds. Low acuity, Neg Covid. [phone] 7:33a Per Odalys, 1 very low acuity F bed available.       [facility] is posting 4 beds. Covid neg. Vol only. Combined adolescent and adult unit. No aggressive or violent behavior. No registered sex offenders. [phone]     [place] posting 0 beds. Negative covid.     [facility] is posting 2 beds. Call for details. [phone] 7:36a Per Liya, beds available.     [facility] is posting 3 beds. [phone]     Pt remains on work list pending appropriate bed availability.

## 2023-03-19 NOTE — ED NOTES
Patient has been up on the unit watching TV/movies before dinner. She had a shower this shift. She has been in her room most of the evening watching TV in her room. She denies feelings of SI, HI and no hallucinations. She was pleasant at the start of the shift but has been crabby this evening.

## 2023-03-19 NOTE — ED NOTES
Patient slept all through the night. Shows no sign of respiratory distress or discomfort. Staff will continue to monitor.

## 2023-03-19 NOTE — ED NOTES
Patient is isolative to her room  calm and cooperative. Patient just had dinner.  Pulse is elevated, all other vital signs stable. Patient denies pain. Denies SI /HI /SIB. No auditory or visual hallucinations reported. Pt has a safety plan. Staff will continue to monitor.

## 2023-03-19 NOTE — ED NOTES
[facility] ED Mental Health Handoff Note:       Brief HPI:  This is a 27 year old female signed out to me by Dr. Pena.  See initial ED Provider note for full details of the presentation. Asha Arrieta is a 27 year old female with a history of schizoaffective disorder who had been discharged from [facility] for aggressive behavior.  She is awaiting inpatient mental health bed as a revoked stay of commitment    Home meds reviewed and ordered/administered: Yes    Medically stable for inpatient mental health admission: Yes.    Evaluated by mental health: Yes. The recommendation is for inpatient mental health treatment. Bed search in process    Safety concerns: At the time I received sign out, there were no safety concerns.    Hold Status:  Active Orders   N/A            Exam:   Patient Vitals for the past 24 hrs:   BP Temp Temp src Pulse Resp SpO2   03/18/23 1658 130/77 98.3  F (36.8  C) Oral 108 16 98 %           ED Course:    Medications   QUEtiapine (SEROquel) tablet 100 mg (100 mg Oral $Given 3/18/23 1533)   fluticasone (FLONASE) 50 MCG/ACT spray 1 spray (1 spray Both Nostrils Not Given 3/18/23 0942)   ibuprofen (ADVIL/MOTRIN) tablet 600 mg (600 mg Oral $Given 3/17/23 2206)   melatonin tablet 3 mg (3 mg Oral $Given 3/17/23 2207)   QUEtiapine ER (SEROquel XR) 24 hr tablet 300 mg (300 mg Oral $Given 3/18/23 2126)   busPIRone (BUSPAR) tablet 7.5 mg (7.5 mg Oral $Given 3/18/23 2126)   cetirizine (zyrTEC) tablet 10 mg (10 mg Oral Not Given 3/18/23 0942)   nicotine (NICORETTE) gum 2 mg (2 mg Buccal $Given 3/15/23 1050)   busPIRone (BUSPAR) tablet 7.5 mg (7.5 mg Oral $Given 3/15/23 1151)            There were no significant events during my shift.    Patient was signed out to the oncoming provider, Dr. Perez      Impression:    ICD-10-CM    1. Schizoaffective disorder, unspecified type (H)  F25.9       2. Chemical dependency (H)  F19.20           Plan:    1. Awaiting inpatient mental health  admission/transfer.      RESULTS:   No results found for this visit on 03/15/23 (from the past 24 hour(s)).          MD Sindi Leija, Kellee GUARDADO MD  03/19/23 1551

## 2023-03-19 NOTE — ED NOTES
Patient was calm this morning shift. She denied pain and other psych symptoms. Patient denied auditory and visual hallucination. Patient contracted for safety. Patient contracted for safety, She spent most of the shift in her room resting. She was medication compliant. Patient stated she is tired of been in the hospital and she wanted to go home. Writer reassured  her  that she should continue to be medication compliant and cooperate with her care team. No inappropriate behaviors noted during the shift. Will continue to monitor for behaviors.

## 2023-03-19 NOTE — ED NOTES
Writer received report from previous shift and took over patient care. Writer introduced self to patient. Patient is calm and relaxed in her room. Has a flat affect. Patient did request for a bath sometimes during the shift. Staff will continue to monitor.

## 2023-03-19 NOTE — ED NOTES
Writer received report from previous shift and took over patient care.  Patient was still awake at the start of the shift, calm and cooperative. Patient is currently sleeping at this time. Staff will continue to monitor for safety.

## 2023-03-19 NOTE — TELEPHONE ENCOUNTER
0234 Bed Search Update:    [facility]- 3/18 at 2335 Website updated to reflect there are no beds available.  [facility], [place], [facility])-Website updated 3/18 at 2148 to reflect there are no beds available.  Check back after 1000  [facility]-0122 unit reporting they are at capacity.  Check back in AM.  [facility]-Website updated 3/19 at 0014 to reflect there are no beds available.  [facility]-Committed pts only.  Long wait list  [facility]-Website updated 3/17 at 2200 to reflect reflect there are no beds   [facility] ([place], [place], [place], [place], [place], [place])- Committed pts only.  Long wait list  [facility]-Website updated 3/18 at 2335 to reflect there are no beds.  Meets exclusionary criteria.  Low acuity only.  Mixed unit adol/adult/say  [facility] ([place], [place], [place])-Meets exclusionary criteria for available beds.  [facility]. [facility]- Meets exclusionary criteria.  Voluntary/[place] only. No hx violence or sexual assault.  [facility]-Meets exclusionary criteria.  No recent hx violence/aggression. Must be able to program in groups.  [facility] Arora- Meets exclusionary criteria. No beds available.  Low acuity only.  [facility]- 0219 unit staff reporting facility is on divert.  [facility]-Low acuity beds and mixed milieu (adol/adults).  Pt and/or guardian must arrange their own transportation upon discharge.     Remains on wait list.

## 2023-03-20 ENCOUNTER — TELEPHONE (OUTPATIENT)
Dept: BEHAVIORAL HEALTH | Facility: CLINIC | Age: 28
End: 2023-03-20
Payer: COMMERCIAL

## 2023-03-20 PROCEDURE — 250N000013 HC RX MED GY IP 250 OP 250 PS 637: Performed by: PSYCHIATRY & NEUROLOGY

## 2023-03-20 RX ADMIN — BUSPIRONE HYDROCHLORIDE 7.5 MG: 7.5 TABLET ORAL at 08:55

## 2023-03-20 RX ADMIN — QUETIAPINE 100 MG: 100 TABLET ORAL at 14:50

## 2023-03-20 RX ADMIN — QUETIAPINE FUMARATE 300 MG: 300 TABLET, EXTENDED RELEASE ORAL at 21:16

## 2023-03-20 RX ADMIN — BUSPIRONE HYDROCHLORIDE 7.5 MG: 7.5 TABLET ORAL at 14:50

## 2023-03-20 RX ADMIN — BUSPIRONE HYDROCHLORIDE 7.5 MG: 7.5 TABLET ORAL at 21:16

## 2023-03-20 RX ADMIN — CETIRIZINE HYDROCHLORIDE 10 MG: 10 TABLET, FILM COATED ORAL at 08:55

## 2023-03-20 RX ADMIN — QUETIAPINE 100 MG: 100 TABLET ORAL at 08:55

## 2023-03-20 ASSESSMENT — ACTIVITIES OF DAILY LIVING (ADL)
ADLS_ACUITY_SCORE: 35

## 2023-03-20 NOTE — ED PROVIDER NOTES
[facility] ED Mental Health Handoff Note:       Brief HPI:  This is a 27 year old female signed out to me by the previous ED proivder.  See initial ED Provider note for full details of the presentation. Interval history is pertinent for Extended Care DEC involvement due to ED boarding. No changes today..    Home meds reviewed and ordered/administered: Yes    Medically stable for inpatient mental health admission: Yes.    Evaluated by mental health: Yes. The recommendation is for inpatient mental health treatment. Bed search in process    Safety concerns: At the time I received sign out, there were no safety concerns.    Hold Status:  Active Orders   N/A       Exam:   Patient Vitals for the past 24 hrs:   BP Temp Temp src Pulse Resp SpO2   03/19/23 1724 123/85 98.6  F (37  C) Oral 106 18 100 %         ED Course:    Medications   QUEtiapine (SEROquel) tablet 100 mg (100 mg Oral $Given 3/20/23 0855)   fluticasone (FLONASE) 50 MCG/ACT spray 1 spray (1 spray Both Nostrils Not Given 3/19/23 0939)   ibuprofen (ADVIL/MOTRIN) tablet 600 mg (600 mg Oral $Given 3/17/23 2206)   melatonin tablet 3 mg (3 mg Oral $Given 3/17/23 2207)   QUEtiapine ER (SEROquel XR) 24 hr tablet 300 mg (300 mg Oral $Given 3/19/23 2203)   busPIRone (BUSPAR) tablet 7.5 mg (7.5 mg Oral $Given 3/20/23 0855)   cetirizine (zyrTEC) tablet 10 mg (10 mg Oral $Given 3/20/23 0855)   nicotine (NICORETTE) gum 2 mg (2 mg Buccal $Given 3/15/23 1050)   busPIRone (BUSPAR) tablet 7.5 mg (7.5 mg Oral $Given 3/15/23 1151)            There were no significant events during my shift.      Impression:    ICD-10-CM    1. Schizoaffective disorder, unspecified type (H)  F25.9       2. Chemical dependency (H)  F19.20           Plan:    1. Awaiting inpatient mental health admission/transfer.      RESULTS:   No results found for this visit on 03/15/23 (from the past 24 hour(s)).          MD Edwin Lee Dung Hoang, MD  03/20/23 4824     DISPLAY PLAN FREE TEXT DISPLAY PLAN FREE TEXT DISPLAY PLAN FREE TEXT DISPLAY PLAN FREE TEXT DISPLAY PLAN FREE TEXT

## 2023-03-20 NOTE — ED NOTES
Patient stayed awake most of the night. Writer offered patient melatonin to help her sleep but patient refused. Patient told writer she slept into the morning of yesterday and woke up late so she just wants to watch television. Patient did not appear restless but was irritable and angry when writer asked why she could not sleep. Patient refused to answer any  other assessment question. She is just getting to sleep this morning. Staff will continue to monitor.

## 2023-03-20 NOTE — ED NOTES
"Triage & Transition Services, Extended Care     Asha Arrieta  March 20, 2023    Asha is followed related to Long wait time for admission: 120+ hours in the ED and provisional discharge has been revoked and patient is on a court hold through the [facility].  Please see initial DEC Crisis Assessment  for complete assessment information. Medical record is reviewed.  Additional notes include:    : Sanford Marinelli  [phone]  Email: [email or web address]    Writer and patient met in-person for about 10 minutes. Patient presented irritable, guarded and expressed frustration with being in the hospital. Patient reports she is here because of \"shitty people\". Patient reports she is not receiving the correct medication however, was unable to elaborate further. Patient shared that she was at [facility] and that she was suppose to only be in the emergency room for a few hours but no one came back for her. When asked if her ideal situation was to return to [facility] plus patient grew irritable and stated \"there is no if, ands, or buts\". When asked about mental health patient stated \"im an adult\".       Plan:  Inpatient Mental Health: for safety and stabilization.   Pt displays the following risk factors that support IP admission: she is currently displaying delusions, agitation and lack of insight into her symptomology. . Pt is unable to engage in safety planning to mitigate risk level in a non-secure setting. Lower levels of care have not been successful in mitigating risk. Due to this IP is the least restrictive option of care for pt. Pt should remain in IP until deemed safe to return to the community and engage in OP MH supports. Pt will need assistance establishing OP MH services prior to discharge.    Plan for Care reviewed with Assigned Medical Provider? Yes. Provider, Dr. Pineda, response: acknowledged     Extended Care will follow and meet with patient/family/care team as able or " requested.     Kami Kan, [facility], [facility]   [phone]

## 2023-03-20 NOTE — TELEPHONE ENCOUNTER
[place]: [place]  Access Inpatient Bed Call Log 3/19/2023  4:45PM     Adults:      [facility] is at capacity.  [facility] is posting 0 beds.    [facility] is posting 0 beds. Negative covid.     [facility] is posting 0 beds.  [facility] are posting 0 beds.     [facility] has 0 beds.Extensive wait List for committed patients.    [facility] is posting 0 bed.        [facility] is posting 0 beds-LOW acuity ONLY. Mixed unit 12+. Negative covid  [facility] has 0 bed. No aggression.  Negative Covid.    [facility] is posting 0 beds.  Negative covid.     [facility] 1 beds in [place], 3 beds in [place]. Low acuity only.  Negative covid. -   [phone]  [facility] is posting 0 beds. Low acuity. 72 HH hold preferred.    5 Beds -     Negative covid. Vol only, No history of aggression, violence, or assault. No sexual offenders. No 72 HH holds.   [phone]    [facility] is posting 4 beds.  Negative covid. (Must have the cognitive ability to do programming. No aggressive or violent behavior or recent HX in the last 2 yrs. MH must be primary.) Always low acuity.  [phone]  [facility] has 0 beds. Negative Covid. Low acuity only. Violence and aggression capped.    [phone]  [facility] is [facility] possible 2 beds. Low acuity, Negative Covid.  [phone]  [facility] is posting 4 beds. Covid Negative. Vol only. Combined adolescent and adult unit. No aggressive or violent behavior. No registered sex offenders.   [phone]  [facility] posting 0 beds. Negative covid..   At capacity.  [facility] posting 3 beds. Negative covid. (No lines, drains, or tubes, oxygen, CPAP, IV, etc.).   [facility] is posting 2 beds. No covid test required.  [phone]    Pt remains on work list pending appropriate bed availability.

## 2023-03-20 NOTE — ED NOTES
Patient came out of her room later this evening and played cards with peer and was laughing so hard. Patient appears happy. Patient is back in her room and still awake. No behavior concerns noted. Staff will continue to monitor.

## 2023-03-20 NOTE — TELEPHONE ENCOUNTER
[place]: [place]  Access Inpatient Bed Call Log 7:56 am (statewide):    Intake has called facilities that have not updated their bed status within the last 12 hours.          [facility] is posting 0 beds.      [facility] is posting 0 beds. Per call to Jorge at 7:58 am, they are at cap.    [facility] is posting 0 beds. [phone]     [facility] is posting 0 beds. [phone] - per call at 7:59 am to Lona, they are at cap but we can call later today to check again    [facility] is posting 0 beds. [phone]     [facility] is posting 0 beds. [phone]     [facility] is posting 0 beds. [phone]     [facility] is posting 0 beds. [phone]     [facility] is posting 0 beds. [phone]           [facility] is posting 2 beds. Mixed unit 12+. Low acuity only.  [phone]; pt not appropriate    [facility] is positing 0 beds. No aggression.  [phone]     [facility] is posting 0 beds. [phone]     [facility] is posting 1 beds. Low acuity only. [phone]  Pt not appropriate    [facility] is posting 0 beds. [phone]     [facility] is posting 0 beds. Low acuity. [phone]     [facility] is posting 1 bed. 72 hr hold preferred. Low acuity. [phone]  Pt not appropriate    [facility] is posting 0 beds. [phone]      [facility] is posting 6 beds. Vol only, No Hx of aggression, violence, or assault. No sexual offenders. No 72 hr holds. [phone] -   Pt meets exclusionary criteria      [facility] is posting 3 beds. (Must have the cognitive ability to do programming. No aggressive or violent behavior or recent HX in the last 2 yrs. MH must be primary.) Low acuity. [phone]  Pt not appropriate    [facility] is posting 0 beds. Low acuity only. Violence and aggression capped. [phone]    [facility] is posting 1 beds. Low acuity, Neg Covid. [phone]  Pt not  appropriate    [facility] is posting 4 beds. Covid neg. Vol only. Combined adolescent and adult unit. No aggressive or violent behavior. No registered sex offenders. [phone]  -  Per call to Kevin at 8:34 am, they have 3 beds. Pt meets exclusionary criteria.     [place], [place] posting 0 beds. Negative covid.           [facility] is posting 3 beds. [phone]   Per call to Brittany at 8:41 am, they have 2 beds, male or female. Mixed unit with adol's and adults. Pt must have transport back after discharge. Per call to Dafne at 9:11 am, must be low acuity. Pt not appropriate.    Pt to wait until a bed is avail.

## 2023-03-20 NOTE — TELEPHONE ENCOUNTER
0329 Bed Search Update:    [facility]- 3/19 at 2326 Website updated to reflect there are no beds available.  [facility] ([facility], [facility] [place], [place], [facility])-Website updated 3/19 at 2205 to reflect there are no beds available.  Check back after 1000  [facility]0150 unit reporting they are at capacity.  Check back in AM.  [facility]-Website updated 3/20 at 0005 to reflect there are no beds available.  [facility] [place]-Committed pts only.  Long wait list  [facility]-Website updated 3/20 at 0102 to reflect reflect there are no beds   [facility] ([place], [place], [place], [place], [place], [place])- Committed pts only.  Long wait list  [facility]-Website updated 3/19 at 2344 to reflect there are no beds.  Meets exclusionary criteria.  Low acuity only.  Mixed unit adol/adult/say  [facility] ([place], [place], [place])-Meets exclusionary criteria for available beds.  [facility]- Meets exclusionary criteria.  Voluntary/[place] only. No hx violence or sexual assault.  [facility]-Meets exclusionary criteria.  No recent hx violence/aggression. Must be able to program in groups.  [facility]- Meets exclusionary criteria. No beds available.  Low acuity only.  [facility]- 0319 unit staff reporting facility is on divert.  [facility]-Low acuity beds and mixed milieu (adol/adults).  Pt and/or guardian must arrange their own transportation upon discharge.     Remains on wait list.

## 2023-03-20 NOTE — ED NOTES
Patient was calm and cooperative during the shift. She spent the morning shift in the milieu playing cards with writer and staff. She hillary, happy. She denied all psych symptoms. Patient contracted for safety. Patient denied pain. She denied SI/HI, SIB. No inappropriate behaviors noted during the shift. Will continue to monitor for behaviors.

## 2023-03-21 ENCOUNTER — TELEPHONE (OUTPATIENT)
Dept: BEHAVIORAL HEALTH | Facility: CLINIC | Age: 28
End: 2023-03-21
Payer: COMMERCIAL

## 2023-03-21 LAB
AMPHETAMINES UR QL SCN: NORMAL
BARBITURATES UR QL SCN: NORMAL
BENZODIAZ UR QL SCN: NORMAL
BZE UR QL SCN: NORMAL
CANNABINOIDS UR QL SCN: NORMAL
HCG UR QL: NEGATIVE
OPIATES UR QL SCN: NORMAL

## 2023-03-21 PROCEDURE — 124N000002 HC R&B MH UMMC

## 2023-03-21 PROCEDURE — 250N000013 HC RX MED GY IP 250 OP 250 PS 637: Performed by: PSYCHIATRY & NEUROLOGY

## 2023-03-21 PROCEDURE — 80307 DRUG TEST PRSMV CHEM ANLYZR: CPT | Performed by: FAMILY MEDICINE

## 2023-03-21 PROCEDURE — 99222 1ST HOSP IP/OBS MODERATE 55: CPT | Mod: AI | Performed by: PSYCHIATRY & NEUROLOGY

## 2023-03-21 PROCEDURE — 81025 URINE PREGNANCY TEST: CPT | Performed by: FAMILY MEDICINE

## 2023-03-21 RX ORDER — QUETIAPINE FUMARATE 100 MG/1
100 TABLET, FILM COATED ORAL 2 TIMES DAILY PRN
Status: DISCONTINUED | OUTPATIENT
Start: 2023-03-21 | End: 2023-04-11 | Stop reason: HOSPADM

## 2023-03-21 RX ORDER — MAGNESIUM HYDROXIDE/ALUMINUM HYDROXICE/SIMETHICONE 120; 1200; 1200 MG/30ML; MG/30ML; MG/30ML
30 SUSPENSION ORAL EVERY 4 HOURS PRN
Status: DISCONTINUED | OUTPATIENT
Start: 2023-03-21 | End: 2023-04-11 | Stop reason: HOSPADM

## 2023-03-21 RX ORDER — CHLORPROMAZINE HYDROCHLORIDE 10 MG/1
10 TABLET, FILM COATED ORAL 3 TIMES DAILY PRN
Status: DISCONTINUED | OUTPATIENT
Start: 2023-03-21 | End: 2023-04-11 | Stop reason: HOSPADM

## 2023-03-21 RX ORDER — ALBUTEROL SULFATE 90 UG/1
1-2 AEROSOL, METERED RESPIRATORY (INHALATION) EVERY 4 HOURS PRN
Status: DISCONTINUED | OUTPATIENT
Start: 2023-03-21 | End: 2023-04-11 | Stop reason: HOSPADM

## 2023-03-21 RX ORDER — DIPHENHYDRAMINE HCL 50 MG
50 CAPSULE ORAL 3 TIMES DAILY PRN
Status: DISCONTINUED | OUTPATIENT
Start: 2023-03-21 | End: 2023-04-11 | Stop reason: HOSPADM

## 2023-03-21 RX ORDER — AMOXICILLIN 250 MG
1 CAPSULE ORAL 2 TIMES DAILY PRN
Status: DISCONTINUED | OUTPATIENT
Start: 2023-03-21 | End: 2023-04-11 | Stop reason: HOSPADM

## 2023-03-21 RX ORDER — CHLORPROMAZINE HYDROCHLORIDE 25 MG/ML
25 INJECTION INTRAMUSCULAR 3 TIMES DAILY PRN
Status: DISCONTINUED | OUTPATIENT
Start: 2023-03-21 | End: 2023-04-11 | Stop reason: HOSPADM

## 2023-03-21 RX ORDER — HYDROXYZINE HYDROCHLORIDE 25 MG/1
25 TABLET, FILM COATED ORAL EVERY 4 HOURS PRN
Status: DISCONTINUED | OUTPATIENT
Start: 2023-03-21 | End: 2023-04-11 | Stop reason: HOSPADM

## 2023-03-21 RX ORDER — ONDANSETRON 4 MG/1
4 TABLET, ORALLY DISINTEGRATING ORAL EVERY 6 HOURS PRN
Status: DISCONTINUED | OUTPATIENT
Start: 2023-03-21 | End: 2023-04-11 | Stop reason: HOSPADM

## 2023-03-21 RX ORDER — DIPHENHYDRAMINE HYDROCHLORIDE 50 MG/ML
50 INJECTION INTRAMUSCULAR; INTRAVENOUS 3 TIMES DAILY PRN
Status: DISCONTINUED | OUTPATIENT
Start: 2023-03-21 | End: 2023-04-11 | Stop reason: HOSPADM

## 2023-03-21 RX ORDER — QUETIAPINE 200 MG/1
600 TABLET, FILM COATED, EXTENDED RELEASE ORAL AT BEDTIME
Status: DISCONTINUED | OUTPATIENT
Start: 2023-03-21 | End: 2023-03-27

## 2023-03-21 RX ORDER — ACETAMINOPHEN 325 MG/1
650 TABLET ORAL EVERY 4 HOURS PRN
Status: DISCONTINUED | OUTPATIENT
Start: 2023-03-21 | End: 2023-04-11 | Stop reason: HOSPADM

## 2023-03-21 RX ORDER — TRAZODONE HYDROCHLORIDE 50 MG/1
50 TABLET, FILM COATED ORAL
Status: DISCONTINUED | OUTPATIENT
Start: 2023-03-21 | End: 2023-03-24

## 2023-03-21 RX ADMIN — NICOTINE POLACRILEX 4 MG: 4 GUM, CHEWING ORAL at 19:16

## 2023-03-21 RX ADMIN — QUETIAPINE 100 MG: 100 TABLET ORAL at 14:37

## 2023-03-21 RX ADMIN — QUETIAPINE FUMARATE 600 MG: 200 TABLET, EXTENDED RELEASE ORAL at 20:00

## 2023-03-21 RX ADMIN — MELATONIN TAB 3 MG 3 MG: 3 TAB at 20:02

## 2023-03-21 RX ADMIN — CETIRIZINE HYDROCHLORIDE 10 MG: 10 TABLET, FILM COATED ORAL at 08:37

## 2023-03-21 RX ADMIN — BUSPIRONE HYDROCHLORIDE 7.5 MG: 7.5 TABLET ORAL at 08:37

## 2023-03-21 RX ADMIN — QUETIAPINE 100 MG: 100 TABLET ORAL at 08:37

## 2023-03-21 RX ADMIN — BUSPIRONE HYDROCHLORIDE 7.5 MG: 7.5 TABLET ORAL at 13:29

## 2023-03-21 RX ADMIN — NICOTINE POLACRILEX 4 MG: 4 GUM, CHEWING ORAL at 18:10

## 2023-03-21 ASSESSMENT — ACTIVITIES OF DAILY LIVING (ADL)
ADLS_ACUITY_SCORE: 45
WALKING_OR_CLIMBING_STAIRS_DIFFICULTY: NO
CONCENTRATING,_REMEMBERING_OR_MAKING_DECISIONS_DIFFICULTY: NO
ADLS_ACUITY_SCORE: 35
ADLS_ACUITY_SCORE: 35
FALL_HISTORY_WITHIN_LAST_SIX_MONTHS: NO
ADLS_ACUITY_SCORE: 35
DOING_ERRANDS_INDEPENDENTLY_DIFFICULTY: NO
CHANGE_IN_FUNCTIONAL_STATUS_SINCE_ONSET_OF_CURRENT_ILLNESS/INJURY: NO
ADLS_ACUITY_SCORE: 35
ADLS_ACUITY_SCORE: 35
ADLS_ACUITY_SCORE: 28
ADLS_ACUITY_SCORE: 28
ORAL_HYGIENE: INDEPENDENT
DIFFICULTY_EATING/SWALLOWING: NO
DRESSING/BATHING_DIFFICULTY: NO
WEAR_GLASSES_OR_BLIND: NO
ADLS_ACUITY_SCORE: 28
HYGIENE/GROOMING: INDEPENDENT
LAUNDRY: WITH SUPERVISION
ADLS_ACUITY_SCORE: 35
TOILETING_ISSUES: NO
ADLS_ACUITY_SCORE: 35
ADLS_ACUITY_SCORE: 35
DRESS: INDEPENDENT

## 2023-03-21 NOTE — ED PROVIDER NOTES
[facility] ED Mental Health Handoff Note:       Brief HPI:  This is a 27 year old female signed out to me.  See initial ED Provider note for full details of the presentation. Patient is on court hold.  Accepted to station 10.     Home meds reviewed and ordered/administered: Yes    Medically stable for inpatient mental health admission: Yes.    Evaluated by mental health: Yes. The recommendation is for inpatient mental health treatment. Bed search in process    Safety concerns: At the time I received sign out, there were no safety concerns.    Hold Status:  Active Orders   N/A           Exam:   Patient Vitals for the past 24 hrs:   BP Temp Temp src Pulse Resp SpO2   03/20/23 1616 113/81 98.2  F (36.8  C) Oral 101 16 99 %           ED Course:    Medications   QUEtiapine (SEROquel) tablet 100 mg (100 mg Oral $Given 3/21/23 0837)   fluticasone (FLONASE) 50 MCG/ACT spray 1 spray (1 spray Both Nostrils Not Given 3/21/23 0839)   ibuprofen (ADVIL/MOTRIN) tablet 600 mg (600 mg Oral $Given 3/17/23 2206)   melatonin tablet 3 mg (3 mg Oral $Given 3/17/23 2207)   QUEtiapine ER (SEROquel XR) 24 hr tablet 300 mg (300 mg Oral $Given 3/20/23 2116)   busPIRone (BUSPAR) tablet 7.5 mg (7.5 mg Oral $Given 3/21/23 0837)   cetirizine (zyrTEC) tablet 10 mg (10 mg Oral $Given 3/21/23 0837)   nicotine (NICORETTE) gum 2 mg (2 mg Buccal $Given 3/15/23 1050)   busPIRone (BUSPAR) tablet 7.5 mg (7.5 mg Oral $Given 3/15/23 1151)            There were no significant events during my shift.    Patient was signed out to the oncoming provider.      Impression:    ICD-10-CM    1. Schizoaffective disorder, unspecified type (H)  F25.9       2. Chemical dependency (H)  F19.20           Plan:    1. Patient accepted to station 10.       RESULTS:   No results found for this visit on 03/15/23 (from the past 24 hour(s)).          MD Joseph Monaco Cara, MD  03/21/23 3237

## 2023-03-21 NOTE — TELEPHONE ENCOUNTER
No appropriate beds are currently available within the  system. Bed search update @ 4AM:        [facility]: @ cap per website  [facility]: @ cap per website  [facility]: @ cap per website. Per Reena @ 04:03 they do not have beds [facility]  [facility] @ [facility] per [facility]  [facility]: @ cap per website  [facility]: @ cap per website  [facility]: @ cap per website  [facility]: Posting 2 beds (Mixed unit/Low acuity only). Per Hilda @ 04:24 they have beds available for very low acuity - they are a mixed unit  [facility]: @ cap per website  [facility]: @ cap per website  [facility]: @ cap per website  [facility]: Does not review after 10PM.    [facility]: @ cap per website  [facility]: @ cap per website  [facility] [place]: @ cap per website  [facility]: Posting 6 beds (No hx of aggression. Voluntary admissions only). Meets exclusionary d/t involuntary status  [facility]: Posting 4 beds (Low acuity only. Must have the cognitive ability to do programming. No aggressive or violent behavior or recent HX in the last 2 yrs. MH must be primary).  [facility] @ cap per website  [facility]: Posting 1 bed (Low acuity, Neg Covid). Per Alyssia @ 04:47 they are full  [facility]: Posting 4 beds (Covid neg. Voluntary only. Mixed unit. No aggressive or violent behavior. No registered sex offenders). Meets exclusionary d/t involuntary status  [facility]: Posting 3 beds (Mixed unit). Per Joselin @ 04:48, she will have her resource RN call Intake back re: bed availability     Pt remains on work list until appropriate placement is available

## 2023-03-21 NOTE — ED NOTES
Pt is in room resting. She ate breakfast and has spent morning in her room. Cooperative but irritable at times. Denies SI/HI or hallucinations. No distress noted.

## 2023-03-21 NOTE — TELEPHONE ENCOUNTER
1000 pt educated on discharge instructions, specific instructions given in regards to what medications to hold for how long. Written out into discharge papers sent with pt. IV d/c. VS obtained. Pt hemodynamically stable. Pt denies any complaints, states he feels good to go home and would like to. [facility] Access Inpatient Bed Call Log @ 8:05am     Intake has called facilities that have not updated their bed status within the last 12 hours.      [facility] is posting 0 beds.      [facility] is posting 0 [phone]      [facility] is posting 0 beds. [phone]     [facility] is posting 0 beds. [phone] - 8:15am spoke to Lona. Call after 9am; per call to Maria C at 9:30 am, they are at cap    [facility] is posting 0 beds. [phone]     [facility] is posting 0 beds. [phone]     [facility] is posting 0 beds. [phone]     [facility] is posting 0 beds. [phone]     [facility] is posting 0 beds. [phone]           [facility] is posting 2 beds. Mixed unit 12+. Low acuity only.  [phone]  Pt not appropriate    [facility] is positing 0 beds. No aggression.  [phone]       [facility] is posting 0 beds. [phone]     [facility] is posting 1 bed. Low acuity only. [phone]  Pt not appropriate    [facility] is posting 1 bed. [phone]   Per call at 9:33 am to Leila, they are at cap but we can call later to check again.     [facility] is posting 0 beds. Low acuity. [phone]     [facility] is posting 1 bed. 72 hr hold preferred. [phone] - 8:15am beds available, Low acuity only Pt not appropriate.    [facility] is posting 0 beds. [phone]      [facility] is posting 5 beds. Vol only, No Hx of aggression, violence, or assault. No sexual offenders. No 72 hr holds. [phone] pt meets exclusionary criteria.           [facility] is posting 4 beds. (Must have the cognitive ability to do programming. No aggressive or violent behavior or recent HX in the last 2 yrs. MH must be primary.)Low acuity [phone]  Pt not appropriate       [facility] is posting 0 beds. Low acuity only. Violence and aggression capped. [phone]     [facility]  posting 2 bed. Low acuity, Neg Covid. [phone]  Pt not appropriate    [facility] is posting 4 beds. Covid neg. Vol only. Combined adolescent and adult unit. No aggressive or violent behavior. No registered sex offenders. [phone]  Pt meets exclusionary criteria     [facility] posting 3 beds. Negative covid.  Low acuity. Author called Michel at 10:15 am and asked her to review pt. She will call back.        [facility] is posting 3 beds. [phone] - 8:25am Per Roro call back an hour or two. Per call at 9:42 am to Gloria, she will call back if a bed opens (just has pt's age, not name)    Paged Aliza at 11:49 am asking her to review pt for #10 and to call back.   Per Michel at [facility], pt is too acute for their unit so they are unable to accept. Await call back from Aliza.       #10/[facility] accepted for herself    #10 informed at 12:36 pm, left message asking for rn to call back as rn busy with a code; bec informed at 12:39pm and informed them [employer] paging is down            mbw

## 2023-03-21 NOTE — ED PROVIDER NOTES
Patient who is boarding in the emergency department.  Apparently has been accepted to station 10.  Signout given to me by Dr. Ruiz.  No acute changes.     Davon Zaragoza MD  03/21/23 7765

## 2023-03-21 NOTE — TELEPHONE ENCOUNTER
11:46a Intake received call from Provider (Aliza). Intake provided pt's MRN for further review, Intake has notified pt's .
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
## 2023-03-21 NOTE — PLAN OF CARE
"  Problem: Anxiety  Goal: Anxiety Reduction or Resolution  Outcome: Not Progressing   Goal Outcome Evaluation:    Asha is a 27 year-old woman who is admitted from the [facility] for mental health evaluation. Per chart review, pt has a past medical history significant for bipolar type schizoaffective disorder, opioid abuse, methamphetamine abuse.    Upon the admission interview, pt presented with elevated mood and irritable affect. Her speech was rapid and pressured. Pt denied depressive symptoms, but reported endorsing anxiety rated 5 on a 10 scale. She denied SI/HI as well as auditory and visual hallucinations. Pt stated \" I can have a roommate.\"   Plan: Status 15s; Build trust with pt. Continue to build on strengths. Encourage healthy coping.     Admission Notification: No one.     Encouraged to notify staff if she needs assistance.                           "

## 2023-03-21 NOTE — PROGRESS NOTES
Triage & Transition Services, Extended Care    Client Name: Asha Arrieta    Date: March 21, 2023  Service Type:  Group Therapy  Session Start Time:  2:40P    Session End Time: 3:00P  Session Length: 20min  Site Location: [facility]  Attendees: Patient and other group members  Facilitator: Onelia Arreola     Topic:   Bottled-Up Emotions    Intervention:    Group process: support, challenge, affirm, psycho-education.     Response:  Patient did participate in group. Behavior in group was appropriate. Patient shared her emotions as content and calm.       Onelia Arreola

## 2023-03-21 NOTE — ED NOTES
Triage & Transition Services, Extended Care     Asha Arrieta  March 21, 2023    Asha is followed related to Long wait time for admission: 146+ hours in the ED. Please see initial DEC Crisis Assessment  for complete assessment information. Medical record is reviewed. Additional notes include:  Provisional discharge has been revoked. Patient is currently on a court hold.     : Sanford Marinelli  [phone]  Email: [email or web address]    Patient declined to met with Writer. Per records patient continues to present cooperative but irritable.     Plan:  Inpatient Mental Health: for safety and stabilization.   Pt displays the following risk factors that support IP admission: she is currently displaying delusions, agitation and lack of insight into her symptomology.  Patient's provisional discharge has been revoked and court hold issued.   Patient has been accepted to unit 10 and is awaiting transfer.     Plan for Care reviewed with Assigned Medical Provider? Yes. Provider, Dr. Ruiz , response: acknowledged     Extended Care will follow and meet with patient/family/care team as able or requested.     Kami Kan, [facility], Extended Care   [phone]

## 2023-03-21 NOTE — ED NOTES
Patient was up on the unit at the start of the shift for a short period of time otherwise she has been in her room in bed watching TV. When talking with her she denies SI, HI. No hallucinations. She is pleasant sometimes and others she is slightly irritable.

## 2023-03-21 NOTE — PLAN OF CARE
03/21/23 1732   Patient Belongings   Did you bring any home meds/supplements to the hospital?  Yes   Disposition of meds  Sent to security/pharmacy per site process   Patient Belongings locker   Patient Belongings Put in Hospital Secure Location (Security or Locker, etc.) cell phone/electronics;clothing;shoes   Belongings Search Yes   Clothing Search Yes   Second Staff Raina Rdz (RN)     Locker:  16 shirts, pair of black sandals, 7 pairs of pants (2 with strings), underwear x4, 2 pairs of socks, bra x3, sweatshirt with string, lanyard, lamp without lightbulb, blanket, charging cord, phone (not damaged).    Unsearched items/bags on cart in exam room.    Bag of medications given to nurse.    A               Admission:  I am responsible for any personal items that are not sent to the safe or pharmacy.  [facility] is not responsible for loss, theft or damage of any property in my possession.    Signature:  _________________________________ Date: _______  Time: _____                                              Staff Signature:  ____________________________ Date: ________  Time: _____      2nd Staff person, if patient is unable/unwilling to sign:    Signature: ________________________________ Date: ________  Time: _____     Discharge:  [facility] has returned all of my personal belongings:    Signature: _________________________________ Date: ________  Time: _____                                          Staff Signature:  ____________________________ Date: ________  Time: _____

## 2023-03-21 NOTE — TELEPHONE ENCOUNTER
No appropriate beds within [place].  Bed search update 9:45PM    [facility]: @ cap per website  [facility]: @ cap per website - Per Marlena, at cap  [facility]: @ cap per website  [facility]: @ cap per website- Per Marlena, at cap  [facility]: @ cap per website  [facility]: @ cap per website  [facility]: @ cap per website  [facility]:  @ cap per website - Per Marlena, at cap  [facility]: @ cap per website  Mixed unit with children.    [facility]: @ cap per website  [facility]: @ cap per website  [facility]: 1 bed posted.  Per Marlena, at [facility]: 1 LOW ACUITY bed posted.  Not appropriate based on acuity.  [facility]: @ cap per website  [facility]: 1 bed posted.  At [facility].    [facility]: @ cap per website  [facility]: 7 LOW ACUITY beds available.  Voluntary only.  [facility]: 6 LOW ACUITY beds available.  Not appropriate.  [facility]:  No beds available  [facility]: 1 bed posted.  Per Liza at [facility]: 4 bed available.  Voluntary pts only.  Mixed unit with children.   Not appropriate  [facility]: 8 beds posted.  [place].  Not appropriate  [facility]: 3 LOW ACUITY beds posted.   Mixed unit with children.  Not appropriate    Pt remains on intake work list awaiting appropriate placement.

## 2023-03-22 LAB
ALBUMIN SERPL BCG-MCNC: 3.7 G/DL (ref 3.5–5.2)
ALP SERPL-CCNC: 124 U/L (ref 35–104)
ALT SERPL W P-5'-P-CCNC: 26 U/L (ref 10–35)
ANION GAP SERPL CALCULATED.3IONS-SCNC: 12 MMOL/L (ref 7–15)
AST SERPL W P-5'-P-CCNC: 19 U/L (ref 10–35)
BASOPHILS # BLD AUTO: 0.1 10E3/UL (ref 0–0.2)
BASOPHILS NFR BLD AUTO: 1 %
BILIRUB SERPL-MCNC: 0.3 MG/DL
BUN SERPL-MCNC: 12.2 MG/DL (ref 6–20)
CALCIUM SERPL-MCNC: 9.3 MG/DL (ref 8.6–10)
CHLORIDE SERPL-SCNC: 102 MMOL/L (ref 98–107)
CHOLEST SERPL-MCNC: 192 MG/DL
CREAT SERPL-MCNC: 0.6 MG/DL (ref 0.51–0.95)
DEPRECATED HCO3 PLAS-SCNC: 22 MMOL/L (ref 22–29)
EOSINOPHIL # BLD AUTO: 0.2 10E3/UL (ref 0–0.7)
EOSINOPHIL NFR BLD AUTO: 2 %
ERYTHROCYTE [DISTWIDTH] IN BLOOD BY AUTOMATED COUNT: 14.2 % (ref 10–15)
GFR SERPL CREATININE-BSD FRML MDRD: >90 ML/MIN/1.73M2
GLUCOSE SERPL-MCNC: 142 MG/DL (ref 70–99)
HBA1C MFR BLD: 8.1 %
HCT VFR BLD AUTO: 37.1 % (ref 35–47)
HDLC SERPL-MCNC: 31 MG/DL
HGB BLD-MCNC: 11.8 G/DL (ref 11.7–15.7)
IMM GRANULOCYTES # BLD: 0 10E3/UL
IMM GRANULOCYTES NFR BLD: 0 %
LDLC SERPL CALC-MCNC: 131 MG/DL
LYMPHOCYTES # BLD AUTO: 2 10E3/UL (ref 0.8–5.3)
LYMPHOCYTES NFR BLD AUTO: 26 %
MCH RBC QN AUTO: 25.5 PG (ref 26.5–33)
MCHC RBC AUTO-ENTMCNC: 31.8 G/DL (ref 31.5–36.5)
MCV RBC AUTO: 80 FL (ref 78–100)
MONOCYTES # BLD AUTO: 0.5 10E3/UL (ref 0–1.3)
MONOCYTES NFR BLD AUTO: 7 %
NEUTROPHILS # BLD AUTO: 5.1 10E3/UL (ref 1.6–8.3)
NEUTROPHILS NFR BLD AUTO: 64 %
NONHDLC SERPL-MCNC: 161 MG/DL
NRBC # BLD AUTO: 0 10E3/UL
NRBC BLD AUTO-RTO: 0 /100
PLATELET # BLD AUTO: 230 10E3/UL (ref 150–450)
POTASSIUM SERPL-SCNC: 4 MMOL/L (ref 3.4–5.3)
PROT SERPL-MCNC: 6.6 G/DL (ref 6.4–8.3)
RBC # BLD AUTO: 4.63 10E6/UL (ref 3.8–5.2)
SODIUM SERPL-SCNC: 136 MMOL/L (ref 136–145)
TRIGL SERPL-MCNC: 152 MG/DL
TSH SERPL DL<=0.005 MIU/L-ACNC: 0.94 UIU/ML (ref 0.3–4.2)
WBC # BLD AUTO: 7.9 10E3/UL (ref 4–11)

## 2023-03-22 PROCEDURE — 85025 COMPLETE CBC W/AUTO DIFF WBC: CPT | Performed by: PSYCHIATRY & NEUROLOGY

## 2023-03-22 PROCEDURE — 250N000013 HC RX MED GY IP 250 OP 250 PS 637: Performed by: PSYCHIATRY & NEUROLOGY

## 2023-03-22 PROCEDURE — 84443 ASSAY THYROID STIM HORMONE: CPT | Performed by: PSYCHIATRY & NEUROLOGY

## 2023-03-22 PROCEDURE — 36415 COLL VENOUS BLD VENIPUNCTURE: CPT | Performed by: PSYCHIATRY & NEUROLOGY

## 2023-03-22 PROCEDURE — 80061 LIPID PANEL: CPT | Performed by: PSYCHIATRY & NEUROLOGY

## 2023-03-22 PROCEDURE — 83036 HEMOGLOBIN GLYCOSYLATED A1C: CPT | Performed by: PSYCHIATRY & NEUROLOGY

## 2023-03-22 PROCEDURE — 124N000002 HC R&B MH UMMC

## 2023-03-22 PROCEDURE — 80053 COMPREHEN METABOLIC PANEL: CPT | Performed by: PSYCHIATRY & NEUROLOGY

## 2023-03-22 PROCEDURE — 250N000013 HC RX MED GY IP 250 OP 250 PS 637: Performed by: CLINICAL NURSE SPECIALIST

## 2023-03-22 RX ORDER — FLUTICASONE PROPIONATE 50 MCG
1 SPRAY, SUSPENSION (ML) NASAL 2 TIMES DAILY
Status: DISCONTINUED | OUTPATIENT
Start: 2023-03-23 | End: 2023-03-22

## 2023-03-22 RX ORDER — FLUTICASONE PROPIONATE 50 MCG
1 SPRAY, SUSPENSION (ML) NASAL 2 TIMES DAILY
Status: DISCONTINUED | OUTPATIENT
Start: 2023-03-22 | End: 2023-04-11 | Stop reason: HOSPADM

## 2023-03-22 RX ADMIN — BUSPIRONE HYDROCHLORIDE 7.5 MG: 7.5 TABLET ORAL at 21:10

## 2023-03-22 RX ADMIN — CETIRIZINE HYDROCHLORIDE 10 MG: 10 TABLET, FILM COATED ORAL at 08:56

## 2023-03-22 RX ADMIN — NICOTINE POLACRILEX 4 MG: 4 GUM, CHEWING ORAL at 13:38

## 2023-03-22 RX ADMIN — QUETIAPINE FUMARATE 100 MG: 100 TABLET ORAL at 13:39

## 2023-03-22 RX ADMIN — FLUTICASONE PROPIONATE 1 SPRAY: 50 SPRAY, METERED NASAL at 09:44

## 2023-03-22 RX ADMIN — NICOTINE POLACRILEX 4 MG: 4 GUM, CHEWING ORAL at 16:44

## 2023-03-22 RX ADMIN — FLUTICASONE PROPIONATE 1 SPRAY: 50 SPRAY, METERED NASAL at 22:11

## 2023-03-22 RX ADMIN — ALBUTEROL SULFATE 2 PUFF: 90 AEROSOL, METERED RESPIRATORY (INHALATION) at 21:10

## 2023-03-22 RX ADMIN — QUETIAPINE FUMARATE 600 MG: 200 TABLET, EXTENDED RELEASE ORAL at 21:10

## 2023-03-22 RX ADMIN — BUSPIRONE HYDROCHLORIDE 7.5 MG: 7.5 TABLET ORAL at 08:56

## 2023-03-22 RX ADMIN — BUSPIRONE HYDROCHLORIDE 7.5 MG: 7.5 TABLET ORAL at 13:19

## 2023-03-22 RX ADMIN — NICOTINE POLACRILEX 4 MG: 4 GUM, CHEWING ORAL at 19:12

## 2023-03-22 ASSESSMENT — ACTIVITIES OF DAILY LIVING (ADL)
ORAL_HYGIENE: INDEPENDENT
ADLS_ACUITY_SCORE: 28
ADLS_ACUITY_SCORE: 28
HYGIENE/GROOMING: INDEPENDENT
ADLS_ACUITY_SCORE: 28
ADLS_ACUITY_SCORE: 28
LAUNDRY: WITH SUPERVISION
ADLS_ACUITY_SCORE: 28
DRESS: INDEPENDENT
DRESS: INDEPENDENT
ORAL_HYGIENE: INDEPENDENT
ADLS_ACUITY_SCORE: 28
LAUNDRY: WITH SUPERVISION
HYGIENE/GROOMING: INDEPENDENT

## 2023-03-22 NOTE — PROGRESS NOTES
03/22/23 1800   General Information   Art Directive other (see comments)     Art Therapy directive was to create a feelings drawing using a feelings wheel handout as a guide to identify current and/or recent feelings. Pt were encouraged to use lines, shapes and colors to express themselves.  Goals of directive: emotional expression, emotional regulation, mindfulness.  Pt was an engaged participant, focused on task for the full duration of group. Pt identified several current and recent feelings through lines shapes and colors.    Pts mood was calm, pleasant participant.

## 2023-03-22 NOTE — PLAN OF CARE
Asha's  Sanford met with her on the unit this afternoon.  Asha was observed conversing pleasantly with him. WR met with Sanford after meeting. He confirmed plan discussed by phone with WR this morning for IRTS referral and referral for [place] choice [place]. Sanford stated that Walter appeared more organized and able to engage today then in near past. Walter stated acceptance of needing adult foster care/group home for time being.
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
## 2023-03-22 NOTE — PLAN OF CARE
Goal Outcome Evaluation:    Problem: Plan of Care - These are the overarching goals to be used throughout the patient stay.    Goal: Optimal Comfort and Wellbeing  Outcome: Progressing        pt slept well, a total of 7 hours. 15 minutes safety checks maintained, no safety concerns observed.

## 2023-03-22 NOTE — PLAN OF CARE
"  Problem: Depressive Symptoms  Goal: Depressive Symptoms  Description: Signs and symptoms of listed problems will be absent or manageable.  Outcome: Not Progressing   Goal Outcome Evaluation:    Plan of Care Reviewed With: patient       Patient was dismissive, flat, blunted affect noted. Patient endorsed depression, replied \"I don't know\" when asked if she has anxiety. Denied pain, SI/HI/AVH and other mental health symptoms. Patient ate 100% of her meals. Patient was isolative and withdrawn.     At 1340 hrs, patient requested for PRN nicotine and seroquel. Patient reported that she needs the seroquel because she's upset. When asked what made her upset, patient responded, \"I'm just upset and I don't need to tell you the reason why I'm upset\".    Patient asked the writer to search for the numbers of her psychiatrist and primary care provider online. She said that she wants to be seen by them and not by in-house providers. She also added that she has a virtual appointment with her psychiatrist tomorrow and will be requesting for her prescriptions to be sent over to the hospital pharmacy. The writer explained to her that the in-house providers are responsible for prescribing her medications while she's her at the hospital. The patient, however, still insisted that she just wants her own psychiatrist not the in-house one.      "

## 2023-03-23 PROCEDURE — 99221 1ST HOSP IP/OBS SF/LOW 40: CPT

## 2023-03-23 PROCEDURE — G0177 OPPS/PHP; TRAIN & EDUC SERV: HCPCS

## 2023-03-23 PROCEDURE — 250N000013 HC RX MED GY IP 250 OP 250 PS 637: Performed by: PSYCHIATRY & NEUROLOGY

## 2023-03-23 PROCEDURE — 99233 SBSQ HOSP IP/OBS HIGH 50: CPT | Performed by: PSYCHIATRY & NEUROLOGY

## 2023-03-23 PROCEDURE — 124N000002 HC R&B MH UMMC

## 2023-03-23 RX ORDER — GABAPENTIN 100 MG/1
100 CAPSULE ORAL 3 TIMES DAILY PRN
Status: DISCONTINUED | OUTPATIENT
Start: 2023-03-23 | End: 2023-03-25

## 2023-03-23 RX ORDER — NICOTINE 21 MG/24HR
1 PATCH, TRANSDERMAL 24 HOURS TRANSDERMAL DAILY
Status: DISCONTINUED | OUTPATIENT
Start: 2023-03-23 | End: 2023-03-24

## 2023-03-23 RX ADMIN — BUSPIRONE HYDROCHLORIDE 7.5 MG: 7.5 TABLET ORAL at 13:06

## 2023-03-23 RX ADMIN — NICOTINE POLACRILEX 4 MG: 4 GUM, CHEWING ORAL at 08:53

## 2023-03-23 RX ADMIN — NICOTINE POLACRILEX 4 MG: 4 GUM, CHEWING ORAL at 10:27

## 2023-03-23 RX ADMIN — NICOTINE 1 PATCH: 14 PATCH, EXTENDED RELEASE TRANSDERMAL at 16:06

## 2023-03-23 RX ADMIN — FLUTICASONE PROPIONATE 1 SPRAY: 50 SPRAY, METERED NASAL at 08:48

## 2023-03-23 RX ADMIN — QUETIAPINE FUMARATE 600 MG: 200 TABLET, EXTENDED RELEASE ORAL at 20:26

## 2023-03-23 RX ADMIN — FLUTICASONE PROPIONATE 1 SPRAY: 50 SPRAY, METERED NASAL at 20:26

## 2023-03-23 RX ADMIN — BUSPIRONE HYDROCHLORIDE 7.5 MG: 7.5 TABLET ORAL at 08:48

## 2023-03-23 RX ADMIN — BUSPIRONE HYDROCHLORIDE 7.5 MG: 7.5 TABLET ORAL at 20:26

## 2023-03-23 RX ADMIN — CETIRIZINE HYDROCHLORIDE 10 MG: 10 TABLET, FILM COATED ORAL at 08:48

## 2023-03-23 RX ADMIN — NICOTINE POLACRILEX 4 MG: 4 GUM, CHEWING ORAL at 15:17

## 2023-03-23 RX ADMIN — IBUPROFEN 600 MG: 600 TABLET ORAL at 22:29

## 2023-03-23 RX ADMIN — QUETIAPINE FUMARATE 100 MG: 100 TABLET ORAL at 10:27

## 2023-03-23 ASSESSMENT — ACTIVITIES OF DAILY LIVING (ADL)
ORAL_HYGIENE: INDEPENDENT
ADLS_ACUITY_SCORE: 28
DRESS: SCRUBS (BEHAVIORAL HEALTH);INDEPENDENT
DRESS: SCRUBS (BEHAVIORAL HEALTH)
ADLS_ACUITY_SCORE: 28
ADLS_ACUITY_SCORE: 28
HYGIENE/GROOMING: INDEPENDENT
ADLS_ACUITY_SCORE: 28
ORAL_HYGIENE: INDEPENDENT
HYGIENE/GROOMING: INDEPENDENT
ADLS_ACUITY_SCORE: 28

## 2023-03-23 NOTE — PROGRESS NOTES
"[facility], [place]   Psychiatric Progress Note  Hospital Day: 2        Interim History:   The patient's care was discussed with the treatment team during the daily team meeting and/or staff's chart notes were reviewed.  Staff report patient has been visible in milieu at times but keeps to self, minimally cooperative with assessments, did attend some groups, did meet with her CM on unit yesterday, taking medications as prescribed along with PRNs, per CM report patient appears more organized with improvements, slept 7 hours.     Writer discussed patient's abnormal labs including elevated hemoglobin A1c with internal medicine provider, who will complete consult and discuss treatment options with patient today, patient does have history of diabetes and has been on metformin in the past.     Upon interview, the patient was sitting out in the milieu, she declined to go and meet in her room, declined to discuss anything with writer, stated that she has an appointment with her own psychiatrist today who will get her on \"the right prescriptions\" writer attempted to review with her what she is concerned about regarding her medications and offered to correct anything that is inaccurate.  Patient continued to decline to engage with writer.  She denied having any safety concerns.  Denied any psychosis.  She also states she does not wish to do anything about her elevated blood glucose and will work on this on discharge.  No additional concerns.    Pitor placed a phone call to patient's outpatient psychiatry clinic, discussed patient's appointment today with BESS Roblero who would leave a message to patient's outpatient psychiatrist Dr. May.  Pitor received a phone call later in day from Dr. May, discussed patient's presentation and unwillingness to engage with treatment team, Dr. Ford plans to discuss with patient over the phone current circumstances and needing to work with inpatient team around the " management of her treatment plan.  He was provided update on patient's progress and treatment plan including likely discharge to IRTS.            Medications:       busPIRone  7.5 mg Oral TID     cetirizine  10 mg Oral Daily     fluticasone  1 spray Both Nostrils BID     QUEtiapine  600 mg Oral At Bedtime          Allergies:     Allergies   Allergen Reactions     Haloperidol Other (See Comments)     Shakes      Lac Bovis Anaphylaxis     Throat swells up      Morphine Hives     Dust Mite Extract      Watery eyes and runny nose     Pollen Extract      Watery eyes and stuffy nose     Zyprexa [Olanzapine]      Skin rash, wants to talk to her MD about it          Labs:     Recent Results (from the past 48 hour(s))   HCG qualitative urine    Collection Time: 03/21/23  3:59 PM   Result Value Ref Range    hCG Urine Qualitative Negative Negative   Drug abuse screen 1 urine (ED)    Collection Time: 03/21/23  4:00 PM   Result Value Ref Range    Amphetamines Urine Screen Negative Screen Negative    Barbituates Urine Screen Negative Screen Negative    Benzodiazepine Urine Screen Negative Screen Negative    Cannabinoids Urine Screen Negative Screen Negative    Cocaine Urine Screen Negative Screen Negative    Opiates Urine Screen Negative Screen Negative   Comprehensive metabolic panel    Collection Time: 03/22/23  8:27 AM   Result Value Ref Range    Sodium 136 136 - 145 mmol/L    Potassium 4.0 3.4 - 5.3 mmol/L    Chloride 102 98 - 107 mmol/L    Carbon Dioxide (CO2) 22 22 - 29 mmol/L    Anion Gap 12 7 - 15 mmol/L    Urea Nitrogen 12.2 6.0 - 20.0 mg/dL    Creatinine 0.60 0.51 - 0.95 mg/dL    Calcium 9.3 8.6 - 10.0 mg/dL    Glucose 142 (H) 70 - 99 mg/dL    Alkaline Phosphatase 124 (H) 35 - 104 U/L    AST 19 10 - 35 U/L    ALT 26 10 - 35 U/L    Protein Total 6.6 6.4 - 8.3 g/dL    Albumin 3.7 3.5 - 5.2 g/dL    Bilirubin Total 0.3 <=1.2 mg/dL    GFR Estimate >90 >60 mL/min/1.73m2   Hemoglobin A1c    Collection Time: 03/22/23  8:27 AM    Result Value Ref Range    Hemoglobin A1C 8.1 (H) <5.7 %   Lipid panel    Collection Time: 03/22/23  8:27 AM   Result Value Ref Range    Cholesterol 192 <200 mg/dL    Triglycerides 152 (H) <150 mg/dL    Direct Measure HDL 31 (L) >=50 mg/dL    LDL Cholesterol Calculated 131 (H) <=100 mg/dL    Non HDL Cholesterol 161 (H) <130 mg/dL   TSH with free T4 reflex and/or T3 as indicated    Collection Time: 03/22/23  8:27 AM   Result Value Ref Range    TSH 0.94 0.30 - 4.20 uIU/mL   CBC with platelets and differential    Collection Time: 03/22/23  8:27 AM   Result Value Ref Range    WBC Count 7.9 4.0 - 11.0 10e3/uL    RBC Count 4.63 3.80 - 5.20 10e6/uL    Hemoglobin 11.8 11.7 - 15.7 g/dL    Hematocrit 37.1 35.0 - 47.0 %    MCV 80 78 - 100 fL    MCH 25.5 (L) 26.5 - 33.0 pg    MCHC 31.8 31.5 - 36.5 g/dL    RDW 14.2 10.0 - 15.0 %    Platelet Count 230 150 - 450 10e3/uL    % Neutrophils 64 %    % Lymphocytes 26 %    % Monocytes 7 %    % Eosinophils 2 %    % Basophils 1 %    % Immature Granulocytes 0 %    NRBCs per 100 WBC 0 <1 /100    Absolute Neutrophils 5.1 1.6 - 8.3 10e3/uL    Absolute Lymphocytes 2.0 0.8 - 5.3 10e3/uL    Absolute Monocytes 0.5 0.0 - 1.3 10e3/uL    Absolute Eosinophils 0.2 0.0 - 0.7 10e3/uL    Absolute Basophils 0.1 0.0 - 0.2 10e3/uL    Absolute Immature Granulocytes 0.0 <=0.4 10e3/uL    Absolute NRBCs 0.0 10e3/uL          Psychiatric Examination:     /81   Pulse 104   Temp 98.4  F (36.9  C) (Oral)   Resp 16   Ht 1.524 m (5')   Wt 86.1 kg (189 lb 14.4 oz)   SpO2 99%   BMI 37.09 kg/m    Weight is 189 lbs 14.4 oz  Body mass index is 37.09 kg/m .    Orthostatic Vitals       Most Recent      Sitting Orthostatic /85 03/23 0923    Sitting Orthostatic Pulse (bpm) 111 03/23 0923    Standing Orthostatic /86 03/23 0923    Standing Orthostatic Pulse (bpm) 127 03/23 0923        Appearance: awake, alert and adequately groomed  Attitude:  uncooperative  Eye Contact:  poor   Mood:  irritable  Affect:   mood congruent  Speech:  loud volume, normal prosody  Language: fluent and intact in English, using profanities   Psychomotor, Gait, Musculoskeletal:  no evidence of tardive dyskinesia, dystonia, or tics  Thought Process:  tangential  Associations:  no loose associations  Thought Content:  no evidence of suicidal ideation or homicidal ideation and denies AVH, appears paranoid/guarded  Insight:  limited  Judgement:  limited  Oriented to:  time, person, and place  Attention Span and Concentration:  limited  Recent and Remote Memory:  limited  Fund of Knowledge:  appropriate    Clinical Global Impressions  First:  Considering your total clinical experience with this particular patient population, how severe are the patient's symptoms at this time?: 7 (03/21/23 1752)  Compared to the patient's condition at the START of treatment, this patient's condition is: 4 (03/21/23 1752)  Most recent:  Considering your total clinical experience with this particular patient population, how severe are the patient's symptoms at this time?: 7 (03/21/23 1752)  Compared to the patient's condition at the START of treatment, this patient's condition is: 4 (03/21/23 1752)           Precautions:     Behavioral Orders   Procedures     Cheeking Precautions (behavioral units)     Code 1 - Restrict to Unit     Elopement precautions     Routine Programming     As clinically indicated     Status 15     Every 15 minutes.          Diagnoses:      Schizoaffective disorder, depressed type vs bipolar type, with acute decompensation in psychosis symptoms and some mood instability (provisional)  Unspecified anxiety   Polysubstance use disorder including opioid and methamphetamine   Nicotine use disorder  R/O substance induced psychosis   Seasonal allergies and rhinitis   Asthma   Obesity   Type 2 DM poorly controlled          Assessment & Plan:   Assessment and hospital summary:  This patient is a 27 year old female under commitment in [facility]  with history of psychosis and polysubstance use who presented to ED from [facility] due to concerns for worsening psychosis despite medication changes and inability to engage in program. She was discharged from , ED staff informed patients PD was revoked and she is now being admitted under committed status with plan to discharge to [facility] once stable per her [facility] CM. She was not cooperative with admission interview, majority of history obtained from chart review, will continue current medications as noted in chart, ordered admission labs, plan to coordinate with patients CM regarding disposition as patient stabilizes.      Inpatient psychiatric hospitalization is warranted at this time for safety, stabilization, and possible adjustment in medications.    Psychiatric treatment/inteventions:  Medications:   -continue PTA quetiapine XR 600mg at bedtime with additional IR 100mg BID PRN for agitation nad psychosis   -continue PTA buspirone 7.5mg TID for anxiety, plan to increase as indicated/toerlated     -PRN hydroxyzine 25mg every 4 hour for anxiety  -PRN trazodone 50mg at bedtime for sleep   -PRN thorazine 10mg PO or 25mg IM with benadryl 50mg PO or IM TID for agitation/psychosis (pt has olanzapine listed as allergy)    The risks, benefits, alternatives and side effects have been discussed and are understood by the patient.     Laboratory/Imaging:  3/22 repeat CMP with alk phos 124 (H), glucose 142 (H), otherwise WNL; hemoglobin A1c 8.1 (H); lipid panel with HDL 31 (L),  (H), non- (H), triglycerides 152 (H), cholesterol 192; TSH WNL; CBC with MCH 25.5 (L) otherwise WNL     Patient will be treated in therapeutic milieu with appropriate individual and group therapies as described.     Medical treatment/interventions:  Medical concerns: Pt denying acute medical concerns, will continue PTA flonase and zyrtec for allergies, PRN albuterol inhaler for asthma and nicotine gum for nicotine withdrawal.  "Elevated HgbA1c and abnormal lipid panel, IM consult placed 3/23, per note:   Asha Arrieta is a 27 year old female admitted on 3/15/2023. She has a past medical history of T2DM, schizoaffective disorder (depressed versus bipolar type), polysubstance use disorder (opioid, methamphetamine, tobacco), seasonal allergies, asthma who was admitted after presenting to the ED from [facility] for worsening psychosis.  Medicine consulted for diabetes management.     Type 2 Diabetes Mellitus, non-insulin-dependent, poorly controlled  A1c this morning 8.1%. Prior to this was 7.4% on 10/17/2022 in Care Everywhere. According to outside medication record, patient had been on Metformin 500 mg daily in the past but stated that she had side effects and would not elaborate further.  Limited data available for review, but glucose trends appear to be elevated above goal (190s-250s).  Patient unwilling to discuss diabetes with writer or \"anyone from this hospital\".  She vehemently denied having diabetes despite writer discussing that her A1c currently categorizes her as having diabetes, and became uncooperative.   - Discussed patient case with Dr. Donal Abrams. Should the patient be open to discussing diabetes with Internal Medicine, we are happy to come back and discuss management with the patient.  - Follow-up with PCP otherwise     Hyperlipidemia, mixed  Lipid panel drawn per psychiatry on admission showing low HDL, high LDL, high triglycerides.  - Follow-up with PCP within 1-2 weeks after discharge     Medicine will sign off. No further recommendations at this time.  Please feel free to reconsult if any new medical issues or concerns.         The patient's care was discussed with the Bedside Nurse and Patient, Primary Team (Dr. Katie Abrams).        Clinically Significant Risk Factors []Expand by Default                         # DMII: A1C = 8.1 % (Ref range: <5.7 %) within past 6 months, PRESENT ON ADMISSION  # " Obesity: Estimated body mass index is 37.09 kg/m  as calculated from the following:    Height as of this encounter: 1.524 m (5').    Weight as of this encounter: 86.1 kg (189 lb 14.4 oz)., PRESENT ON ADMISSION             Toby Rendon PA-C  Hospitalist Service      Disposition Plan   Reason for ongoing admission: is unable to care for self due to severe psychosis or luli  Discharge location: [facility] facility  Discharge Medications: not ordered  Follow-up Appointments: not scheduled  Legal Status: full commitment, status of [place] unclear, CTC working to determine    >50 min total time that was spent in counseling and coordination of care with staff, reviewing medical record, educating patient about treatment options, side effects and benefits and alternative treatments for medications, providing supportive therapy and redirection regarding above symptoms.     This document is created with the help of Dragon dictation system.  All grammatical/typing errors or context distortion are unintentional and inherent to software.    Patient has been seen and evaluated by Katie adams DO.

## 2023-03-23 NOTE — CONSULTS
"[facility]  Consult Note - Hospitalist Service  Date of Admission:  3/15/2023  Consult Requested by: Katie Abrams MD  Reason for Consult: Diabetes Management    Assessment & Plan   Asha Arrieta is a 27 year old female admitted on 3/15/2023. She has a past medical history of T2DM, schizoaffective disorder (depressed versus bipolar type), polysubstance use disorder (opioid, methamphetamine, tobacco), seasonal allergies, asthma who was admitted after presenting to the ED from [facility] for worsening psychosis.  Medicine consulted for diabetes management.    Type 2 Diabetes Mellitus, non-insulin-dependent, poorly controlled  A1c this morning 8.1%. Prior to this was 7.4% on 10/17/2022 in Care Everywhere. According to outside medication record, patient had been on Metformin 500 mg daily in the past but stated that she had side effects and would not elaborate further.  Limited data available for review, but glucose trends appear to be elevated above goal (190s-250s).  Patient unwilling to discuss diabetes with writer or \"anyone from this hospital\".  She vehemently denied having diabetes despite writer discussing that her A1c currently categorizes her as having diabetes, and became uncooperative.   - Discussed patient case with Dr. Donal Abrams. Should the patient be open to discussing diabetes with Internal Medicine, we are happy to come back and discuss management with the patient.  - Follow-up with PCP otherwise    Hyperlipidemia, mixed  Lipid panel drawn per psychiatry on admission showing low HDL, high LDL, high triglycerides.  - Follow-up with PCP within 1-2 weeks after discharge    Medicine will sign off. No further recommendations at this time.  Please feel free to reconsult if any new medical issues or concerns.      The patient's care was discussed with the Bedside Nurse and Patient, Primary Team (Dr. Katie Abrams).    Clinically Significant Risk " "Factors                       # DMII: A1C = 8.1 % (Ref range: <5.7 %) within past 6 months, PRESENT ON ADMISSION  # Obesity: Estimated body mass index is 37.09 kg/m  as calculated from the following:    Height as of this encounter: 1.524 m (5').    Weight as of this encounter: 86.1 kg (189 lb 14.4 oz)., PRESENT ON ADMISSION         Toby Rendon PA-C  Hospitalist Service  Securely message with [employer] (more info)  Text page via [facility] Paging/Directory   ______________________________________________________________________    Chief Complaint   \"I don't need you here, I don't have diabetes\"    History obtained from patient (though limited), RN (Ahsan), and Primary Team (Dr. Katie Abrams)    History of Present Illness   Asha Arrieta is a 27 year old female who is seen in the milieu today.  Upon approaching patient, she inquires what writer wants to discuss with her.  Writer states wanting to discuss diabetes and her A1c of 8.1%, to which she responds \"I do not have diabetes, I do not want to talk with you about it, I do not want to talk with anyone from this hospital about it, I do not have diabetes\". The patient states that she was on metformin previously, but due to side effects (which the patient refused to elaborate on), she has not been taking it for an unspecified amount of time.     Past Medical History    No past medical history on file.    Past Surgical History   No past surgical history on file.    Medications   I have reviewed this patient's current medications , but unable to complete thorough review with patient given her behavior and refusal to engage.    Unable to obtain ROS, allergy, family history, social history given patient's refusal to engage.    Physical Exam   Vital Signs: Temp: 98.4  F (36.9  C) Temp src: Oral BP: 124/81 Pulse: 104   Resp: 16 SpO2: 99 % O2 Device: None (Room air)    Weight: 189 lbs 14.4 oz    Constitutional: WDWN, NAD.  Seen sitting upright in chair in milieu.  Eyes: " lids and lashes normal, sclera clear and conjunctiva normal  ENT: normocepalic, without obvious abnormality  Respiratory: No stridor, normal effort on RA, no coughing or obvious distress.  Auscultation deferred given patient's refusal to cooperate with interview.  Cardiovascular: No obvious cardiac distress. Upper extremities and skin appear to be well-perfused. Auscultation deferred given patient's refusal to cooperate with interview.  Skin: no bruising or bleeding, no redness, warmth, or swelling, no rashes and no lesions on visualized skin (upper extremities, head, neck).  Musculoskeletal: No obvious deformities.   Neurologic: Moving all extremities equally and spontaneously. No obvious focal neuro deficits.  Neuropsychiatric: General: normal eye contact, uncooperative.  Level of consciousness: alert / normal  Affect: irritable mood.    Medical Decision Making       35 MINUTES SPENT BY ME on the date of service doing chart review, history, exam, documentation & further activities per the note.      Data         Imaging results reviewed over the past 24 hrs:   No results found for this or any previous visit (from the past 24 hour(s)).  Recent Labs   Lab 03/22/23  0827 03/16/23  1812   WBC 7.9 9.9   HGB 11.8 11.9   MCV 80 82    260    136   POTASSIUM 4.0 4.4   CHLORIDE 102 99   CO2 22 26   BUN 12.2 9.1   CR 0.60 0.70   ANIONGAP 12 11   VIKY 9.3 9.8   * 251*   ALBUMIN 3.7 4.0   PROTTOTAL 6.6 7.0   BILITOTAL 0.3 0.2   ALKPHOS 124* 140*   ALT 26 36*   AST 19 23

## 2023-03-23 NOTE — PLAN OF CARE
03/23/23 1216   Individualization/Patient Specific Goals   Patient Personal Strengths community support;expressive of emotions;resilient   Patient Vulnerabilities family/relationship conflict;history of unsuccessful treatment;housing insecurity;lacks insight into illness;occupational insecurity;limited social skills;poor impulse control;substance abuse/addiction   Interprofessional Rounds   Participants nursing;CTC;psychiatrist   Behavioral Team Discussion   Participants Ahsan Chi RN, Nurys Hsu [facility]   Progress new admission   Anticipated length of stay 2 weeks   Continued Stay Criteria/Rationale Walter requires stabilization of psychiatric symptoms. Asha is homeless and on a civil commitment [facility],   Medical/Physical IM consulted for concerns related to previous diabetes dx.   Plan Patient will receive psychiatric and nursing cares on unit, will be offered OT and psychotherapy groups. CTC will assist with treatment/disposition planning, as well as plans for outpatient services for following discharge.   Rationale for change in precautions or plan new admission   Anticipated Discharge Disposition IRTS;psychiatric hospital     PRECAUTIONS AND SAFETY    Behavioral Orders   Procedures    Cheeking Precautions (behavioral units)    Code 1 - Restrict to Unit    Elopement precautions    Routine Programming     As clinically indicated    Status 15     Every 15 minutes.       Safety  Safety WDL: WDL  Patient Location: [facility]  Observed Behavior: calm  Observed Behavior (Comment): calm  Safety Measures: safety rounds completed, suicide assessment completed  Suicidality: Status 15  Assault: status 15  Elopement Assessment: Statements about wanting to leave  Elopement Interventions: status 15  Sexual: status 15

## 2023-03-23 NOTE — PLAN OF CARE
03/23/23 1547   Group Therapy Session   Group Attendance attended group session   Time Session Began 1415   Time Session Ended 1500   Total Time (minutes) 20   Total # Attendees 5   Group Type psychotherapeutic   Group Topic Covered relationship;self-care activities   Group Session Detail Relationships: [facility]ivating Your Self-Care [facility]   Patient Response/Contribution did not discuss personal experience;did not share thoughts verbally   Patient Participation Detail Asha appeared to following along with discussion and decided after about 20 minutes to leave group without explanation. She slammed the group door on her way out.

## 2023-03-23 NOTE — PLAN OF CARE
Assessment/Intervention/Current Symtoms and Care Coordination:  -Refer to psychosocial completed on 3/23/23 for assessment/social functioning  -Chart review  -Team meeting - nurse reports Asha has been visible in milieu, endorses depression and anxiety and appears to have a disorganized thought process at times, emotionally labile, IM consult being place for concerns about BG, took medications with encouragement - appeared possibly paranoid with these too.    WR met with Asha in the dining area, Asha declined to meet anywhere more private. Per request of [facility] access hospital , WR asked Asha to sign an HIWOT for [facility] to make referral for [place] choice assessment. Asha refused to sign this form, said that her commitment is done in July and that at that time she plans to live alone or with family. Asha said she's okay with the plan for IRTS, but only wants the two that her CM has already referred her to and did not want WR to send additional referrals. WR emailed CAMILO Christina and updated him on this. DIONNE will continue to work to build rapport and trust.     Current Symptoms include the following: Psychosis, disorganization, patient is irritable, patient is on elopement precautions, and paranoia  Precautions: Elopement and Cheeking    Discharge Plan or Goal:  Pending stabilization & development of a safe discharge plan.  Considerations include: IRTS  and Group home/adult foster care    Barriers to Discharge:  Patient presents with disorganized thought  process, paranoia. Asha requires symptom stabilization, medication management, and supportive discharge plan.     Referral Status:  TBD    Legal Status:  Patient is under an MICD Commitment in [facility]    Contacts:  [facility]  [place]    Upcoming Meetings/Important Dates:  TBD    Rationale for SIO/No Roommate Order:  Patient is not on SIO.  Patient has current roommate.

## 2023-03-23 NOTE — PLAN OF CARE
Goal Outcome Evaluation:       Patient was in bed at the beginning of the shift, breathing quietly and unlabored.   Pt slept 7 hrs  No concerns reported or noted this shift.  Will continue to Monitor.  PRNs: None

## 2023-03-23 NOTE — PLAN OF CARE
03/23/23 1300   General Information   Date Initially Attended OT 03/23/23 03/23/23 1323   Group Therapy Session   Group Attendance attended group session   Time Session Began 1015   Time Session Ended 1150   Total Time (minutes) 70   Total # Attendees 5   Group Type Occupational Therapy   Group Topic Covered balanced lifestyle;coping skills/lifestyle management;leisure exploration/use of leisure time;relaxation techniques   Group Session Detail OT Clinic Group   Patient Participation Detail   Intervention: OT Clinic with 4 peers. Pt participated in a OT Clinic group to facilitate coping skills exploration and creative expression through personally meaningful activities, and to encourage utilization of these healthy coping skills to promote overall health and wellness. Group included clinical observation of social, cognitive and kinesthetic performance skills to inform treatment and safe discharge planning.    Patient Response: Was introduced to clinic group purpose, structure and project options available. Looked through several projects before selecting a beading project. Was able to sort through beads and complete this ind. Selected new project after this and worked on for the rest of duration of group. Was intermittently social with writer off and on throughout group, mainly regarding materials and project. Was noted to speak at increased volume often throughout group although not wearing headphones nor was music playing in the background loud. Did not observe any signs of actively responding to internal stimuli during this time.     Mood/Affect: Pleasant       Plan: Patient encouraged to maintain attendance for continued ongoing support in working towards occupational therapy goals to support overall treatment/care.

## 2023-03-23 NOTE — PLAN OF CARE
"  Problem: Anxiety  Goal: Anxiety Reduction or Resolution  Outcome: Progressing   Goal Outcome Evaluation:    Pt was visible in the lounge for most of the shift. Pt took a shower and changed into clean scrubs. Pt was calm and sociable with select peer. Pt continues to present with paranoid delusions as she stated \" I want my out-patient doctor to send my meds to the hospital for me to take.\" Pt also stated \" I want to use either my cell phone or the hospital PC to video call my outpatient doctor to manage her care while she is in the hospital. I have an appointment with my outpatient doctor.\"  Pt stated \" I only trust my own doctor.\" Pt was educated on patient care management as it relates to the hospital. Otherwise, pt denied having safety concerns including thoughts of harming self or others. Pt denied auditory and visual hallucinations and took her medications as prescribed.   Plan: Continue to provide teaching about pt care management in the hospital and continue to provide safe environment and therapeutic milieu.     "

## 2023-03-23 NOTE — PLAN OF CARE
"She is active on the unit and denies pain/discomfort, cold and flu like symptoms when asked.  She displays disorganized thought process by difficulty talking about her condition in interactions.  States that she does have anxiety, depression, yet denied suicidal ideation when asked.  States that her mood is still, \" up and down,\" in discussions.  She became agitated, irritable when physician's assistant tried to discuss diabetes management.  She was not willing to discuss treatment options and said, \" I don't have it, the metformin caused me side effects, can I have a new doctor.\"  Shortly, after conversation, she did ask for a PRN of Seroquel, which was given at 1030.  Later, she was confused over ordering items on her lunch tray, items that she requested were given to her.  Also, she became irritable over not being able to do a video conference with her outpatient provider despite repeated education on this manner in previous shifts.  Per charge RN, they did set up a video conference with outpatient provider, who explained of her that services will be provider by hospital staff only.  She came up to writer and said that, \" the medications aren't doing shit, they're not helping me.\"  Writer reviewed prn options, yet refused medications when offered to her.  Also, she states being upset that her outpatient provider cannot prescribe her medication.  As in previous shifts, education was given that hospital staff will provide cares.  Charge RN notified of findings and charge RN talked with MD regarding care options, and obtained order for gabapentin.  At shift change, states she needs a stronger nicotine, believed was not getting enough of nicotine dosage, so nicotine package was shown to her.  Education was given on the nicotine patch, states willing to try.  Also, she states, \" I'm agitated,\" and reviewed and offered prns, yet refused.  Discussed with MD and obtained order for nicotine patch, evening staff notified " of all findings.

## 2023-03-24 ENCOUNTER — TELEPHONE (OUTPATIENT)
Dept: BEHAVIORAL HEALTH | Facility: CLINIC | Age: 28
End: 2023-03-24
Payer: COMMERCIAL

## 2023-03-24 PROCEDURE — 124N000002 HC R&B MH UMMC

## 2023-03-24 PROCEDURE — 99233 SBSQ HOSP IP/OBS HIGH 50: CPT | Performed by: PSYCHIATRY & NEUROLOGY

## 2023-03-24 PROCEDURE — 250N000013 HC RX MED GY IP 250 OP 250 PS 637: Performed by: PSYCHIATRY & NEUROLOGY

## 2023-03-24 PROCEDURE — 250N000011 HC RX IP 250 OP 636: Performed by: PSYCHIATRY & NEUROLOGY

## 2023-03-24 RX ORDER — TRAZODONE HYDROCHLORIDE 50 MG/1
50-100 TABLET, FILM COATED ORAL
Status: DISCONTINUED | OUTPATIENT
Start: 2023-03-24 | End: 2023-04-11 | Stop reason: HOSPADM

## 2023-03-24 RX ORDER — NICOTINE 21 MG/24HR
1 PATCH, TRANSDERMAL 24 HOURS TRANSDERMAL DAILY
Status: DISCONTINUED | OUTPATIENT
Start: 2023-03-24 | End: 2023-04-11 | Stop reason: HOSPADM

## 2023-03-24 RX ORDER — PRENATAL VIT/IRON FUM/FOLIC AC 27MG-0.8MG
1 TABLET ORAL DAILY
Status: DISCONTINUED | OUTPATIENT
Start: 2023-03-25 | End: 2023-04-11 | Stop reason: HOSPADM

## 2023-03-24 RX ORDER — EMOLLIENT BASE
CREAM (GRAM) TOPICAL EVERY 6 HOURS PRN
Status: DISCONTINUED | OUTPATIENT
Start: 2023-03-24 | End: 2023-04-11 | Stop reason: HOSPADM

## 2023-03-24 RX ORDER — CHOLECALCIFEROL (VITAMIN D3) 125 MCG
6000 CAPSULE ORAL
Status: DISCONTINUED | OUTPATIENT
Start: 2023-03-24 | End: 2023-04-11 | Stop reason: HOSPADM

## 2023-03-24 RX ORDER — BENZTROPINE MESYLATE 1 MG/1
1 TABLET ORAL 2 TIMES DAILY PRN
Status: DISCONTINUED | OUTPATIENT
Start: 2023-03-24 | End: 2023-04-11 | Stop reason: HOSPADM

## 2023-03-24 RX ADMIN — QUETIAPINE FUMARATE 100 MG: 100 TABLET ORAL at 17:27

## 2023-03-24 RX ADMIN — BUSPIRONE HYDROCHLORIDE 7.5 MG: 7.5 TABLET ORAL at 13:33

## 2023-03-24 RX ADMIN — FLUTICASONE PROPIONATE 1 SPRAY: 50 SPRAY, METERED NASAL at 14:58

## 2023-03-24 RX ADMIN — ACETAMINOPHEN 650 MG: 325 TABLET ORAL at 00:00

## 2023-03-24 RX ADMIN — IBUPROFEN 600 MG: 600 TABLET ORAL at 21:19

## 2023-03-24 RX ADMIN — TRAZODONE HYDROCHLORIDE 100 MG: 50 TABLET ORAL at 21:19

## 2023-03-24 RX ADMIN — ALBUTEROL SULFATE 2 PUFF: 90 AEROSOL, METERED RESPIRATORY (INHALATION) at 14:59

## 2023-03-24 RX ADMIN — NICOTINE POLACRILEX 4 MG: 4 GUM, CHEWING ORAL at 15:08

## 2023-03-24 RX ADMIN — NICOTINE POLACRILEX 4 MG: 4 GUM, CHEWING ORAL at 08:54

## 2023-03-24 RX ADMIN — QUETIAPINE FUMARATE 600 MG: 200 TABLET, EXTENDED RELEASE ORAL at 21:18

## 2023-03-24 RX ADMIN — CETIRIZINE HYDROCHLORIDE 10 MG: 10 TABLET, FILM COATED ORAL at 08:43

## 2023-03-24 RX ADMIN — NICOTINE 1 PATCH: 14 PATCH, EXTENDED RELEASE TRANSDERMAL at 08:51

## 2023-03-24 RX ADMIN — BUSPIRONE HYDROCHLORIDE 7.5 MG: 7.5 TABLET ORAL at 08:43

## 2023-03-24 RX ADMIN — NICOTINE POLACRILEX 4 MG: 4 GUM, CHEWING ORAL at 10:35

## 2023-03-24 RX ADMIN — ONDANSETRON 4 MG: 4 TABLET, ORALLY DISINTEGRATING ORAL at 15:42

## 2023-03-24 RX ADMIN — NICOTINE 1 PATCH: 21 PATCH, EXTENDED RELEASE TRANSDERMAL at 10:32

## 2023-03-24 RX ADMIN — TRAZODONE HYDROCHLORIDE 50 MG: 50 TABLET ORAL at 00:00

## 2023-03-24 RX ADMIN — Medication 6000 UNITS: at 17:09

## 2023-03-24 RX ADMIN — GABAPENTIN 100 MG: 100 CAPSULE ORAL at 21:19

## 2023-03-24 RX ADMIN — GABAPENTIN 100 MG: 100 CAPSULE ORAL at 10:34

## 2023-03-24 RX ADMIN — Medication 6000 UNITS: at 12:58

## 2023-03-24 RX ADMIN — ONDANSETRON 4 MG: 4 TABLET, ORALLY DISINTEGRATING ORAL at 06:59

## 2023-03-24 RX ADMIN — NICOTINE POLACRILEX 4 MG: 4 GUM, CHEWING ORAL at 13:33

## 2023-03-24 RX ADMIN — BUSPIRONE HYDROCHLORIDE 7.5 MG: 7.5 TABLET ORAL at 21:19

## 2023-03-24 ASSESSMENT — ACTIVITIES OF DAILY LIVING (ADL)
ADLS_ACUITY_SCORE: 28
DRESS: INDEPENDENT
ADLS_ACUITY_SCORE: 28
LAUNDRY: WITH SUPERVISION
ADLS_ACUITY_SCORE: 28
HYGIENE/GROOMING: INDEPENDENT
ADLS_ACUITY_SCORE: 28
ORAL_HYGIENE: INDEPENDENT
ADLS_ACUITY_SCORE: 28

## 2023-03-24 NOTE — PLAN OF CARE
"Pt is visible in the milieu, comes out for meals and medication. Pt is pleasant and cooperative , laughs out loud during interactions with this staff. She is social with peers, sang with them when one pt played the guitar. Pt said her mood is \"content\", denies all mental health symptoms. She was able to join the community meeting and 1 group. Her goal is to attend more groups for today. Pt asked for PRN for her anxiety  and reported that Gabapentin was effective.     Problem: Plan of Care - These are the overarching goals to be used throughout the patient stay.    Goal: Patient-Specific Goal (Individualized)  Description: You can add care plan individualizations to a care plan. Examples of Individualization might be:  \"Parent requests to be called daily at 9am for status\", \"I have a hard time hearing out of my right ear\", or \"Do not touch me to wake me up as it startles me\".  Outcome: Progressing  Goal: Absence of Hospital-Acquired Illness or Injury  Outcome: Progressing     Problem: Adult Behavioral Health Plan of Care  Goal: Patient-Specific Goal (Individualization)  Description: You can add care plan individualizations to a care plan. Examples of Individualization might be:  \"Parent requests to be called daily at 9am for status\", \"I have a hard time hearing out of my right ear\", or \"Do not touch me to wake me up as it startles me\".  Outcome: Progressing  Flowsheets (Taken 3/24/2023 1237)  Patient Personal Strengths: expressive of needs     Problem: Psychotic Signs/Symptoms  Goal: Improved Behavioral Control (Psychotic Signs/Symptoms)  Outcome: Progressing     Problem: Anxiety  Goal: Anxiety Reduction or Resolution  Outcome: Progressing     Problem: Depressive Symptoms  Goal: Depressive Symptoms  Description: Signs and symptoms of listed problems will be absent or manageable.  Outcome: Progressing     Problem: Suicidal Behavior  Goal: Suicidal Behavior is Absent or Managed  Outcome: Progressing   Goal Outcome " Evaluation:    Plan of Care Reviewed With: patient

## 2023-03-24 NOTE — PLAN OF CARE
Pt did c/o back pain 5/10; denied SI/SIB or any hallucinations.  PRN tylenol was given with relief.  Pt also requested and given PRN Trazodone to promote sleep- was helpful. Towards the end of the shift, pt c/o nausea - requested and given PRN Zofran as ordered. Pt appears to have slept for  5.75 hours.  Will continue to monitor and offer support.     Problem: Sleep Disturbance  Goal: Adequate Sleep/Rest  Outcome: Progressing   Goal Outcome Evaluation:

## 2023-03-24 NOTE — TELEPHONE ENCOUNTER
R: Patient cleared and ready for behavioral bed placement: Yes    8:27pm Intake received a call from [facility], reporting there was a pipe burst in this pt's room and they are planning to have this pt transfer to st 20.     8:30pm Intake paged Dr. Burt.     8:31pm Intake messaged Dr. Abrams.     9pm Intake received a message from Dr. Abrams reporting that this pt is fine to transfer to st 20 under her attending. Intake will place pt's un queue.     9:08pm intake called st 20 and provided disposition to charge nurse. Nurse report: anytime.     9:10pm Intake called st 10 and provided placement information to charge nurse.

## 2023-03-24 NOTE — PLAN OF CARE
Assessment/Intervention/Current Symtoms and Care Coordination:  -Chart review  -Team meeting - Asha was more pleasant on approach and aware that she will return to [facility] when there is an available room.   -Writer called [facility] [address] (phone: [phone]) to clarify whether there is active Lyons order and was told the Lyons order was valid for 1 year following the signing of the order, so it  on 2023. Writer provided update to provider and asked about whether she wants to pursue petition for Lyons - team will reassess on Monday 3/27.  -Writer contacted  Sanford to confirm his understanding related to the Lyons order.   Current Symptoms include the following: Psychosis, disorganization, patient is irritable and paranoia  Precautions: Elopement and Cheeking    Discharge Plan or Goal:  Pending stabilization & development of a safe discharge plan.  Considerations include: IRTS with plan to pursue group home placement    Barriers to Discharge:  Asha presents with disorganized thought process and paranoia. She requires symptom stabilization, medication management, and supportive discharge plan.     Referral Status:  IRTS referrals (initiated by  Sanford)    Shamika Ngo    Legal Status:  Patient is under an MICD Commitment in [facility]   Case No. [id number]    Contacts:    [facility]  - Sanford Marinelli (phone: [phone], email: [email or web address])    Upcoming Meetings/Important Dates:  N/A    Rationale for SIO/No Roommate Order:  Patient is not on SIO.  Patient has current roommate.

## 2023-03-24 NOTE — PROGRESS NOTES
Asha entered group late this afternoon, presenting as highly irritable to writer.  She passively observed session, but was observed tapping her foot to the music.  No charge for time in session.

## 2023-03-24 NOTE — PROGRESS NOTES
Patient was transferred from station 20 to ST 10. Patient on arrival is social, presenting with a bright and full range affect. Patient requested and received her nasal spray and albuterol inhaler.   Patient has concerns with Trazodone, Seroquel and Gabapentin. Patient has been redirected that the provider will address her concerns when she is seen next. Patient acknowledge understanding.   Will continue to monitor.

## 2023-03-24 NOTE — PROGRESS NOTES
"[facility], [place]   Psychiatric Progress Note  Hospital Day: 3        Interim History:   The patient's care was discussed with the treatment team during the daily team meeting and/or staff's chart notes were reviewed.  Staff report patient continues to be highly irritable, is visible in milieu but uncooperative with assessments and engagements by staff, attending partial portions of groups, slammed group door when left yesterday, reluctantly taking medications, continues to state she is given incorrect medications, received PRN ibuprofen for neck pain and trazodone for sleep, transferred to 20N due to maintenance issue in her room, slept 5.75 hours.     Upon interview, patient was much brighter in interaction, was agreeable to speaking with writer today, was agreeable to reviewing medications, states that she already went over her medications with her nurse and feels like the medications \"are being corrected\" writer again reviewed medications that are being ordered and that writer had discussed her medications with her outpatient psychiatrist Dr. May.  She then started to be more open with writer and stated that she noticed some possible stiffness in her muscles and discussed having some as needed Cogentin available as she has used this before.  She also asked about having a higher dose of nicotine patch.  She also reported having some dry skin and wanting some Vanicream ordered along with medication to help with her \"allergic reaction to milk\".  She reports that she has taken Lactaid in the past and it was helpful.  She reports that she gets GI upset and also reports that her throat closed on 1 occasion but the lactate was helpful with for this.  She denies having any thoughts of harming self or others.  Denies having any hallucinations.  She asked about starting a prenatal vitamin and iron, discussed possible multivitamin but she stated that she was told that she should take a " "prenatal vitamin.  She did not state that she believed that she is pregnant.  Reviewed plan for her to transfer back down to [address]. when a bed is available, she was accepting of this but also stated \"this is stupid\" for her to be moving units, writer validated her frustration.  Attempted to discuss with her her elevated blood sugars and diagnosis of diabetes, she states that when she was in [facility] she would eat bags of candy along with soda and that is why her sugar was elevated but now that her diet has improved she does not feel she needs to take anything for diabetes.  Writer attempted to provide education about her lab results and about needed treatment but patient was not receptive.  She was more pleasant in her declining of any intervention than she was in previous days.  No additional concerns.         Medications:       busPIRone  7.5 mg Oral TID     cetirizine  10 mg Oral Daily     fluticasone  1 spray Both Nostrils BID     nicotine  1 patch Transdermal Daily     nicotine   Transdermal Q8H     QUEtiapine  600 mg Oral At Bedtime          Allergies:     Allergies   Allergen Reactions     Haloperidol Other (See Comments)     Shakes      Lac Bovis Anaphylaxis     Throat swells up      Morphine Hives     Dust Mite Extract      Watery eyes and runny nose     Pollen Extract      Watery eyes and stuffy nose     Zyprexa [Olanzapine]      Skin rash, wants to talk to her MD about it          Labs:     Recent Results (from the past 48 hour(s))   Comprehensive metabolic panel    Collection Time: 03/22/23  8:27 AM   Result Value Ref Range    Sodium 136 136 - 145 mmol/L    Potassium 4.0 3.4 - 5.3 mmol/L    Chloride 102 98 - 107 mmol/L    Carbon Dioxide (CO2) 22 22 - 29 mmol/L    Anion Gap 12 7 - 15 mmol/L    Urea Nitrogen 12.2 6.0 - 20.0 mg/dL    Creatinine 0.60 0.51 - 0.95 mg/dL    Calcium 9.3 8.6 - 10.0 mg/dL    Glucose 142 (H) 70 - 99 mg/dL    Alkaline Phosphatase 124 (H) 35 - 104 U/L    AST 19 10 - 35 U/L    ALT " 26 10 - 35 U/L    Protein Total 6.6 6.4 - 8.3 g/dL    Albumin 3.7 3.5 - 5.2 g/dL    Bilirubin Total 0.3 <=1.2 mg/dL    GFR Estimate >90 >60 mL/min/1.73m2   Hemoglobin A1c    Collection Time: 03/22/23  8:27 AM   Result Value Ref Range    Hemoglobin A1C 8.1 (H) <5.7 %   Lipid panel    Collection Time: 03/22/23  8:27 AM   Result Value Ref Range    Cholesterol 192 <200 mg/dL    Triglycerides 152 (H) <150 mg/dL    Direct Measure HDL 31 (L) >=50 mg/dL    LDL Cholesterol Calculated 131 (H) <=100 mg/dL    Non HDL Cholesterol 161 (H) <130 mg/dL   TSH with free T4 reflex and/or T3 as indicated    Collection Time: 03/22/23  8:27 AM   Result Value Ref Range    TSH 0.94 0.30 - 4.20 uIU/mL   CBC with platelets and differential    Collection Time: 03/22/23  8:27 AM   Result Value Ref Range    WBC Count 7.9 4.0 - 11.0 10e3/uL    RBC Count 4.63 3.80 - 5.20 10e6/uL    Hemoglobin 11.8 11.7 - 15.7 g/dL    Hematocrit 37.1 35.0 - 47.0 %    MCV 80 78 - 100 fL    MCH 25.5 (L) 26.5 - 33.0 pg    MCHC 31.8 31.5 - 36.5 g/dL    RDW 14.2 10.0 - 15.0 %    Platelet Count 230 150 - 450 10e3/uL    % Neutrophils 64 %    % Lymphocytes 26 %    % Monocytes 7 %    % Eosinophils 2 %    % Basophils 1 %    % Immature Granulocytes 0 %    NRBCs per 100 WBC 0 <1 /100    Absolute Neutrophils 5.1 1.6 - 8.3 10e3/uL    Absolute Lymphocytes 2.0 0.8 - 5.3 10e3/uL    Absolute Monocytes 0.5 0.0 - 1.3 10e3/uL    Absolute Eosinophils 0.2 0.0 - 0.7 10e3/uL    Absolute Basophils 0.1 0.0 - 0.2 10e3/uL    Absolute Immature Granulocytes 0.0 <=0.4 10e3/uL    Absolute NRBCs 0.0 10e3/uL          Psychiatric Examination:     /87   Pulse 102   Temp 98.7  F (37.1  C)   Resp (!) 98   Ht 1.524 m (5')   Wt 86.2 kg (190 lb)   SpO2 99%   BMI 37.11 kg/m    Weight is 190 lbs 0 oz  Body mass index is 37.11 kg/m .    Orthostatic Vitals       Most Recent      Sitting Orthostatic /87 03/24 0853    Sitting Orthostatic Pulse (bpm) 114 03/24 0853    Standing Orthostatic BP  119/87 03/24 0853    Standing Orthostatic Pulse (bpm) 121 03/24 0853        Appearance: awake, alert and adequately groomed  Attitude:  more cooperative  Eye Contact:  fair  Mood:  improved, less irritable, possibly somewhat elevated  Affect:  mood congruent, bright, laughing   Speech:  loud volume, normal prosody  Language: fluent and intact in English  Psychomotor, Gait, Musculoskeletal:  no evidence of tardive dyskinesia, dystonia, or tics  Thought Process:  tangential  Associations:  no loose associations  Thought Content:  no evidence of suicidal ideation or homicidal ideation and denies AVH, appears paranoid/guarded  Insight:  limited  Judgement:  limited  Oriented to:  time, person, and place  Attention Span and Concentration:  limited  Recent and Remote Memory:  limited  Fund of Knowledge:  appropriate    Clinical Global Impressions  First:  Considering your total clinical experience with this particular patient population, how severe are the patient's symptoms at this time?: 7 (03/21/23 1752)  Compared to the patient's condition at the START of treatment, this patient's condition is: 4 (03/21/23 1752)  Most recent:  Considering your total clinical experience with this particular patient population, how severe are the patient's symptoms at this time?: 7 (03/21/23 1752)  Compared to the patient's condition at the START of treatment, this patient's condition is: 4 (03/21/23 1752)           Precautions:     Behavioral Orders   Procedures     Cheeking Precautions (behavioral units)     Code 1 - Restrict to Unit     Elopement precautions     Routine Programming     As clinically indicated     Status 15     Every 15 minutes.          Diagnoses:      Schizoaffective disorder, depressed type vs bipolar type, with acute decompensation in psychosis symptoms and some mood instability (provisional)  Unspecified anxiety   Polysubstance use disorder including opioid and methamphetamine   Nicotine use disorder  R/O  substance induced psychosis   Seasonal allergies and rhinitis   Asthma   Obesity   Type 2 DM poorly controlled          Assessment & Plan:   Assessment and hospital summary:  This patient is a 27 year old female under commitment in [facility] with history of psychosis and polysubstance use who presented to ED from [facility] due to concerns for worsening psychosis despite medication changes and inability to engage in program. She was discharged from , ED staff informed patients PD was revoked and she is now being admitted under committed status with plan to discharge to [facility] once stable per her [facility] CM. She was not cooperative with admission interview, majority of history obtained from chart review, will continue current medications as noted in chart, ordered admission labs, plan to coordinate with patients CM regarding disposition as patient stabilizes.      Inpatient psychiatric hospitalization is warranted at this time for safety, stabilization, and possible adjustment in medications.    Psychiatric treatment/inteventions:  Medications:   -continue PTA quetiapine XR 600mg at bedtime with additional IR 100mg BID PRN for agitation and psychosis   -continue PTA buspirone 7.5mg TID for anxiety, plan to increase as indicated/toerlated     -start PRN benzotropine 1mg BID for possible EPSE   -PRN hydroxyzine 25mg every 4 hour for anxiety  -increase PRN trazodone to 100mg at bedtime for sleep   -PRN thorazine 10mg PO or 25mg IM with benadryl 50mg PO or IM TID for agitation/psychosis (pt has olanzapine listed as allergy)    -will also start PRN vanicream for dry skin, Lactaid for lactose intolerance, will increase nicotine patch and order a prenatal vitamin per pts request    The risks, benefits, alternatives and side effects have been discussed and are understood by the patient.     Laboratory/Imaging: no new labs ordered      Patient will be treated in therapeutic milieu with appropriate individual and group  "therapies as described.     Medical treatment/interventions:  Medical concerns: Pt denying acute medical concerns, will continue PTA flonase and zyrtec for allergies, PRN albuterol inhaler for asthma and nicotine gum for nicotine withdrawal. Elevated HgbA1c and abnormal lipid panel, IM consult placed 3/23, per note:   Asha Arrieta is a 27 year old female admitted on 3/15/2023. She has a past medical history of T2DM, schizoaffective disorder (depressed versus bipolar type), polysubstance use disorder (opioid, methamphetamine, tobacco), seasonal allergies, asthma who was admitted after presenting to the ED from [facility] for worsening psychosis.  Medicine consulted for diabetes management.     Type 2 Diabetes Mellitus, non-insulin-dependent, poorly controlled  A1c this morning 8.1%. Prior to this was 7.4% on 10/17/2022 in Care Everywhere. According to outside medication record, patient had been on Metformin 500 mg daily in the past but stated that she had side effects and would not elaborate further.  Limited data available for review, but glucose trends appear to be elevated above goal (190s-250s).  Patient unwilling to discuss diabetes with writer or \"anyone from this hospital\".  She vehemently denied having diabetes despite writer discussing that her A1c currently categorizes her as having diabetes, and became uncooperative.   - Discussed patient case with Dr. Donal Abrams. Should the patient be open to discussing diabetes with Internal Medicine, we are happy to come back and discuss management with the patient.  - Follow-up with PCP otherwise     Hyperlipidemia, mixed  Lipid panel drawn per psychiatry on admission showing low HDL, high LDL, high triglycerides.  - Follow-up with PCP within 1-2 weeks after discharge     Medicine will sign off. No further recommendations at this time.  Please feel free to reconsult if any new medical issues or concerns.         The patient's care was discussed with the " Bedside Nurse and Patient, Primary Team (Dr. Katie Abrams).        Clinically Significant Risk Factors []Expand by Default                         # DMII: A1C = 8.1 % (Ref range: <5.7 %) within past 6 months, PRESENT ON ADMISSION  # Obesity: Estimated body mass index is 37.09 kg/m  as calculated from the following:    Height as of this encounter: 1.524 m (5').    Weight as of this encounter: 86.1 kg (189 lb 14.4 oz)., PRESENT ON ADMISSION             Toby Rendon PA-C  Hospitalist Service      Disposition Plan   Reason for ongoing admission: is unable to care for self due to severe psychosis or luli  Discharge location: [facility] facility  Discharge Medications: not ordered  Follow-up Appointments: not scheduled  Legal Status: full commitment, status of Lyons unclear, CTC working to determine    >50min total time that was spent in counseling and coordination of care with staff, reviewing medical record, educating patient about treatment options, side effects and benefits and alternative treatments for medications, providing supportive therapy and redirection regarding above symptoms.     This document is created with the help of Dragon dictation system.  All grammatical/typing errors or context distortion are unintentional and inherent to software.    Patient has been seen and evaluated by Katie adams DO.

## 2023-03-24 NOTE — PLAN OF CARE
"Goal Outcome Evaluation:    Plan of Care Reviewed With: patient      Problem: Plan of Care - These are the overarching goals to be used throughout the patient stay.    Goal: Plan of Care Review  Outcome: Not Progressing     Problem: Psychotic Signs/Symptoms  Goal: Improved Behavioral Control (Psychotic Signs/Symptoms)  Outcome: Progressing     Pt presented with aloof, flat affect, excitable, angry, threatening mood. Pt endorsed pain, stated \"from the medications they are giving me\", pt wanted to be given Cogentin, stating\" I have used it before\". Pt did not rate pain. Pt responded to assessment of anxiety/depression, \" no, I am just \"pissed\", real angry\". Denied SI/HI \" I never have those thoughts\", no hallucinations. Pt reports ' I don't feel safe in this hospital because I am been given the wrong medications\". Then she continues to talk about how she is going to dede everybody in this hospital when she gets out. Pt was argumentative, irrational, mistrustful, could listen to anything writer was explaining. Pt was paranoid saying \" the 15 year old girl downstairs has made you all sick with diseases you don't know about\".  Pt was preoccupied with receiving the wrong prescription, has no insight to her illness or treatment plan. She is disoriented to situation. Pt was reluctantly compliant with all scheduled medications, reported having pain as a side effect from the medications she is taking. Attended evening group, was social and engaged with select peer, watched TV with select peer. Pt had no outburst, no elopement/cheekin behaviors.   The pt is transferring to [facility] due to a leak in ceiling of the pt's room.     Addendum at 1030: pt received PRN Ibuprofen for neck pain 8/10.               "

## 2023-03-25 PROCEDURE — 250N000013 HC RX MED GY IP 250 OP 250 PS 637: Performed by: PSYCHIATRY & NEUROLOGY

## 2023-03-25 PROCEDURE — 124N000002 HC R&B MH UMMC

## 2023-03-25 PROCEDURE — G0177 OPPS/PHP; TRAIN & EDUC SERV: HCPCS

## 2023-03-25 PROCEDURE — H2032 ACTIVITY THERAPY, PER 15 MIN: HCPCS

## 2023-03-25 RX ORDER — GABAPENTIN 300 MG/1
300 CAPSULE ORAL 3 TIMES DAILY PRN
Status: DISCONTINUED | OUTPATIENT
Start: 2023-03-25 | End: 2023-04-11 | Stop reason: HOSPADM

## 2023-03-25 RX ORDER — PANTOPRAZOLE SODIUM 40 MG/1
40 TABLET, DELAYED RELEASE ORAL
Status: DISCONTINUED | OUTPATIENT
Start: 2023-03-25 | End: 2023-03-27

## 2023-03-25 RX ADMIN — TRAZODONE HYDROCHLORIDE 100 MG: 50 TABLET ORAL at 21:46

## 2023-03-25 RX ADMIN — ALBUTEROL SULFATE 2 PUFF: 90 AEROSOL, METERED RESPIRATORY (INHALATION) at 21:47

## 2023-03-25 RX ADMIN — CETIRIZINE HYDROCHLORIDE 10 MG: 10 TABLET, FILM COATED ORAL at 08:50

## 2023-03-25 RX ADMIN — BUSPIRONE HYDROCHLORIDE 7.5 MG: 7.5 TABLET ORAL at 21:42

## 2023-03-25 RX ADMIN — QUETIAPINE FUMARATE 600 MG: 200 TABLET, EXTENDED RELEASE ORAL at 21:42

## 2023-03-25 RX ADMIN — GABAPENTIN 100 MG: 100 CAPSULE ORAL at 08:51

## 2023-03-25 RX ADMIN — PANTOPRAZOLE SODIUM 40 MG: 40 TABLET, DELAYED RELEASE ORAL at 12:17

## 2023-03-25 RX ADMIN — ALUMINUM HYDROXIDE, MAGNESIUM HYDROXIDE, AND DIMETHICONE 30 ML: 200; 20; 200 SUSPENSION ORAL at 18:06

## 2023-03-25 RX ADMIN — ALBUTEROL SULFATE 2 PUFF: 90 AEROSOL, METERED RESPIRATORY (INHALATION) at 08:50

## 2023-03-25 RX ADMIN — Medication 6000 UNITS: at 08:33

## 2023-03-25 RX ADMIN — Medication 6000 UNITS: at 17:05

## 2023-03-25 RX ADMIN — FLUTICASONE PROPIONATE 1 SPRAY: 50 SPRAY, METERED NASAL at 08:51

## 2023-03-25 RX ADMIN — Medication 6000 UNITS: at 12:16

## 2023-03-25 RX ADMIN — BUSPIRONE HYDROCHLORIDE 7.5 MG: 7.5 TABLET ORAL at 13:13

## 2023-03-25 RX ADMIN — NICOTINE 1 PATCH: 21 PATCH, EXTENDED RELEASE TRANSDERMAL at 08:50

## 2023-03-25 RX ADMIN — BUSPIRONE HYDROCHLORIDE 7.5 MG: 7.5 TABLET ORAL at 08:50

## 2023-03-25 RX ADMIN — PRENATAL VITAMINS-IRON FUMARATE 27 MG IRON-FOLIC ACID 0.8 MG TABLET 1 TABLET: at 08:50

## 2023-03-25 RX ADMIN — FLUTICASONE PROPIONATE 1 SPRAY: 50 SPRAY, METERED NASAL at 21:43

## 2023-03-25 ASSESSMENT — ACTIVITIES OF DAILY LIVING (ADL)
ADLS_ACUITY_SCORE: 28
HYGIENE/GROOMING: INDEPENDENT
ADLS_ACUITY_SCORE: 28
LAUNDRY: WITH SUPERVISION
ADLS_ACUITY_SCORE: 28
DRESS: INDEPENDENT
ADLS_ACUITY_SCORE: 28
ADLS_ACUITY_SCORE: 28
ORAL_HYGIENE: INDEPENDENT
ADLS_ACUITY_SCORE: 28

## 2023-03-25 NOTE — PLAN OF CARE
Problem: Adult Behavioral Health Plan of Care  Goal: Develops/Participates in Therapeutic [place] to Support Successful Transition  Outcome: Progressing  Flowsheets (Taken 3/25/2023 1018)  Develops/Participates in Therapeutic [place] to Support Successful Transition: making progress toward outcome  Note: Pt participates in group; present in the milieu. Appropriate with staff and peers.      Behavioral  Pt slept 7 hours overnight; eating and hydrating adequately. Compliant with medications. Attending to ADL's appropriately; no behavioral escalation or concerns noted this shift. pleasant and cooperative upon approach; speech fluent and appropriate in context; Pt social with staff and peers; attended groups; denied SI, SIB, HI, and hallucinations; affect flat and blunted; mood labile and anxious and depressed; Pt rates anxiety and depression 3/10. Pt indicated that she wants to transition off Seroquel as she does not find the Seroquel helpful and also finds the side effects of weight gain undesirable; She stated that she finds the Trazodone helpful for sleep and the gabapentin helpful for anxiety. Pt is also hoping to find an alternative to acetaminophen and ibuprofen for her back pain.     Medical  Vital signs:  Temp: 97.6  F (36.4  C) Temp src: Oral BP: 130/83 Pulse: 102     SpO2: 100 %     PRN'S: PRN albuterol and gabapentin given for anxiety.     Provider increased gabapentin to 300 mg TID for anxiety.     Plan  Pending stabilization & development of a safe discharge plan.  Considerations include: IRTS with plan to pursue group home placement

## 2023-03-25 NOTE — PLAN OF CARE
"  Problem: Anxiety  Goal: Anxiety Reduction or Resolution  Outcome: Progressing   Goal Outcome Evaluation:    Plan of Care Reviewed With: patient      Pt presented as labile and irritable stating \" I'm pissed off \" then walked away. Pt refused to tell writer what was wrong. Pt attended the unit group briefly, spent much of the shift watching TV in the lounge, took a shower and laundered her clothes. Pt denied SI/SIBAVH. Received prn Zofran and Seroquel per her request. No behavioral outburst noted. Denied bowel and bladder issue.   Plan: Continue to provide safe environment and therapeutic milieu.     "

## 2023-03-25 NOTE — PROVIDER NOTIFICATION
03/25/23 0600   Sleep/Rest   Night Time # Hours 7 hours     Pt had a quiet night with no behavioral or safety concerns. Pt was observed sleeping during the safety checks, no acute events noted or reported.

## 2023-03-25 NOTE — PLAN OF CARE
Occupational Therapy Group Note:       03/25/23 1649   Group Therapy Session   Group Attendance attended group session   Time Session Began 1015   Time Session Ended 1115   Total Time (minutes) 60   Total # Attendees 8   Group Type recreation   Group Topic Covered leisure exploration/use of leisure time;structured socialization   Group Session Detail OT Leisure Group   Patient Response/Contribution confronted peers appropriately;cooperative with task;listened actively;offered helpful suggestions to peers;organized   Patient Participation Detail The focus of today's group was leisure exploration and engagement. Patient engaged in a therapeutic game to encourage new learning, problem solving, focus, socialization, and cognitive wellness. Patient was familiar with game and assisted peers with strategic game moves. Patient was able to recall directions of game without additional cueing needed. Independently engaged in game and was able to keep track of score independently. Socially appropriate. Able to track and focus throughout game. Positive group participant. Affect: congruent. Mood: calm, cooperative.

## 2023-03-26 PROCEDURE — 250N000013 HC RX MED GY IP 250 OP 250 PS 637: Performed by: PSYCHIATRY & NEUROLOGY

## 2023-03-26 PROCEDURE — 124N000002 HC R&B MH UMMC

## 2023-03-26 RX ADMIN — ALBUTEROL SULFATE 2 PUFF: 90 AEROSOL, METERED RESPIRATORY (INHALATION) at 08:35

## 2023-03-26 RX ADMIN — PANTOPRAZOLE SODIUM 40 MG: 40 TABLET, DELAYED RELEASE ORAL at 08:27

## 2023-03-26 RX ADMIN — Medication 6000 UNITS: at 17:53

## 2023-03-26 RX ADMIN — IBUPROFEN 600 MG: 600 TABLET ORAL at 02:37

## 2023-03-26 RX ADMIN — PRENATAL VITAMINS-IRON FUMARATE 27 MG IRON-FOLIC ACID 0.8 MG TABLET 1 TABLET: at 08:28

## 2023-03-26 RX ADMIN — FLUTICASONE PROPIONATE 1 SPRAY: 50 SPRAY, METERED NASAL at 19:45

## 2023-03-26 RX ADMIN — HYDROXYZINE HYDROCHLORIDE 25 MG: 25 TABLET, FILM COATED ORAL at 08:33

## 2023-03-26 RX ADMIN — NICOTINE 1 PATCH: 21 PATCH, EXTENDED RELEASE TRANSDERMAL at 08:28

## 2023-03-26 RX ADMIN — ALBUTEROL SULFATE 2 PUFF: 90 AEROSOL, METERED RESPIRATORY (INHALATION) at 19:53

## 2023-03-26 RX ADMIN — BUSPIRONE HYDROCHLORIDE 7.5 MG: 7.5 TABLET ORAL at 19:44

## 2023-03-26 RX ADMIN — GABAPENTIN 300 MG: 300 CAPSULE ORAL at 02:52

## 2023-03-26 RX ADMIN — IBUPROFEN 600 MG: 600 TABLET ORAL at 19:56

## 2023-03-26 RX ADMIN — FLUTICASONE PROPIONATE 1 SPRAY: 50 SPRAY, METERED NASAL at 08:34

## 2023-03-26 RX ADMIN — BUSPIRONE HYDROCHLORIDE 7.5 MG: 7.5 TABLET ORAL at 14:28

## 2023-03-26 RX ADMIN — Medication 6000 UNITS: at 08:28

## 2023-03-26 RX ADMIN — GABAPENTIN 300 MG: 300 CAPSULE ORAL at 11:55

## 2023-03-26 RX ADMIN — TRAZODONE HYDROCHLORIDE 100 MG: 50 TABLET ORAL at 02:36

## 2023-03-26 RX ADMIN — NICOTINE POLACRILEX 4 MG: 4 GUM, CHEWING ORAL at 19:53

## 2023-03-26 RX ADMIN — BUSPIRONE HYDROCHLORIDE 7.5 MG: 7.5 TABLET ORAL at 08:28

## 2023-03-26 RX ADMIN — CETIRIZINE HYDROCHLORIDE 10 MG: 10 TABLET, FILM COATED ORAL at 08:28

## 2023-03-26 RX ADMIN — Medication 6000 UNITS: at 11:55

## 2023-03-26 RX ADMIN — TRAZODONE HYDROCHLORIDE 100 MG: 50 TABLET ORAL at 19:55

## 2023-03-26 ASSESSMENT — ACTIVITIES OF DAILY LIVING (ADL)
ADLS_ACUITY_SCORE: 28
LAUNDRY: WITH SUPERVISION
HYGIENE/GROOMING: INDEPENDENT
ADLS_ACUITY_SCORE: 28
ORAL_HYGIENE: INDEPENDENT
ADLS_ACUITY_SCORE: 28
DRESS: INDEPENDENT
ORAL_HYGIENE: INDEPENDENT
ADLS_ACUITY_SCORE: 28
HYGIENE/GROOMING: INDEPENDENT
ADLS_ACUITY_SCORE: 28
DRESS: INDEPENDENT
ADLS_ACUITY_SCORE: 28
LAUNDRY: WITH SUPERVISION
ADLS_ACUITY_SCORE: 28
ADLS_ACUITY_SCORE: 28

## 2023-03-26 NOTE — PROGRESS NOTES
03/25/23 1900   Group Therapy Session   Group Attendance attended group session   Time Session Began 1600   Time Session Ended 1650   Total Time (minutes) 50   Total # Attendees 6   Group Type recreation   Group Topic Covered leisure exploration/use of leisure time   Group Session Detail TR leisure group   Patient Response/Contribution cooperative with task   Patient Participation Detail Pt actively participated in a structured Therapeutic Recreation group with a focus on leisure participation, stress reduction, and social engagement via an active group game. Pt remained mostly focused and engaged throughout full duration of group, occasionally appearing to lose focus briefly.  Pt mood was calm and was appropriate with interactions. Pt was unfamiliar with the game and often needed assistance for her turns. Approximately prison through group, pt appeared to be able to recognize the basic scoring options and needed less guidance.

## 2023-03-26 NOTE — PLAN OF CARE
Pt appeared to have difficulty sleeping through the night. Around 0230, pt came out of her room and requested and received a second dose of PRN Trazodone (100mg); she had received the first dose around 2145. She stated she has been sleeping but now was awake and couldn't fall back asleep. She also requested PRN Ibuprofen (600mg) for bilateral foot pain. About 20 minutes later, she requested PRN Gabapentin (300mg) for anxiety. She appeared to sleep 6 hours overnight. No other concerns noted.       Problem: Sleep Disturbance  Goal: Adequate Sleep/Rest  Outcome: Progressing

## 2023-03-26 NOTE — PLAN OF CARE
Problem: Plan of Care - These are the overarching goals to be used throughout the patient stay.    Goal: Optimal Comfort and Wellbeing  Outcome: Progressing  Intervention: Provide Person-Centered Care     Goal Outcome Evaluation:    Plan of Care Reviewed With: patient        Patient attended group, was visible in the lounge throughout the shift, watched movies and socialized with select peers. Patient presents with flat/tense affect, but pleasant and cooperative with assessment. Patient denied any safety concerns, no report of anxiety or depression at this time. Patient denied pain, SI/SIB. No indication of patient responding to internal stimuli. Intake is adequate. V.S have been stable. Patient was medication compliant. Will continue to monitor and assess response to treatment.

## 2023-03-26 NOTE — PLAN OF CARE
Problem: Plan of Care - These are the overarching goals to be used throughout the patient stay.    Goal: Plan of Care Review  Description: The Plan of Care Review/Shift note should be completed every shift.  The Outcome Evaluation is a brief statement about your assessment that the patient is improving, declining, or no change.  This information will be displayed automatically on your shift note.  Outcome: Progressing   Goal Outcome Evaluation:    Plan of Care Reviewed With: patient        Pt slept most of the morning shift and came out for breakfast and lunch. Pt endorsed anxiety 5/10 and depression 2/10 and received prn hydroxyzine with morning meds and reassessed before lunch and pt rated anxiety 3/10 and received prn gabapentin per request and took a nap after lunch. Pt denies all other mental health psych symptoms and contracted for safety. Pt presented with flat affect but brighten with interaction. Pt speech clear and coherent and intermittent eye contact. No outburst behavior observed or reported. Good appetite, adequate fluid intake, voiding freely and no BM issue per pt.

## 2023-03-27 PROCEDURE — 124N000002 HC R&B MH UMMC

## 2023-03-27 PROCEDURE — 250N000013 HC RX MED GY IP 250 OP 250 PS 637: Performed by: PSYCHIATRY & NEUROLOGY

## 2023-03-27 PROCEDURE — G0177 OPPS/PHP; TRAIN & EDUC SERV: HCPCS

## 2023-03-27 PROCEDURE — 99233 SBSQ HOSP IP/OBS HIGH 50: CPT | Performed by: PSYCHIATRY & NEUROLOGY

## 2023-03-27 RX ORDER — MINERAL OIL/HYDROPHIL PETROLAT
OINTMENT (GRAM) TOPICAL
Status: DISCONTINUED | OUTPATIENT
Start: 2023-03-27 | End: 2023-04-11 | Stop reason: HOSPADM

## 2023-03-27 RX ORDER — QUETIAPINE FUMARATE 300 MG/1
600 TABLET, FILM COATED ORAL AT BEDTIME
Status: DISCONTINUED | OUTPATIENT
Start: 2023-03-27 | End: 2023-04-11 | Stop reason: HOSPADM

## 2023-03-27 RX ORDER — MINERAL OIL/HYDROPHIL PETROLAT
OINTMENT (GRAM) TOPICAL AT BEDTIME
Status: DISCONTINUED | OUTPATIENT
Start: 2023-03-27 | End: 2023-04-11 | Stop reason: HOSPADM

## 2023-03-27 RX ADMIN — NICOTINE 1 PATCH: 21 PATCH, EXTENDED RELEASE TRANSDERMAL at 08:24

## 2023-03-27 RX ADMIN — BUSPIRONE HYDROCHLORIDE 7.5 MG: 7.5 TABLET ORAL at 21:18

## 2023-03-27 RX ADMIN — TRAZODONE HYDROCHLORIDE 100 MG: 50 TABLET ORAL at 21:18

## 2023-03-27 RX ADMIN — ALBUTEROL SULFATE 2 PUFF: 90 AEROSOL, METERED RESPIRATORY (INHALATION) at 08:33

## 2023-03-27 RX ADMIN — IBUPROFEN 600 MG: 600 TABLET ORAL at 21:17

## 2023-03-27 RX ADMIN — FLUTICASONE PROPIONATE 1 SPRAY: 50 SPRAY, METERED NASAL at 21:18

## 2023-03-27 RX ADMIN — ALBUTEROL SULFATE 2 PUFF: 90 AEROSOL, METERED RESPIRATORY (INHALATION) at 21:18

## 2023-03-27 RX ADMIN — Medication 6000 UNITS: at 08:24

## 2023-03-27 RX ADMIN — PRENATAL VITAMINS-IRON FUMARATE 27 MG IRON-FOLIC ACID 0.8 MG TABLET 1 TABLET: at 08:24

## 2023-03-27 RX ADMIN — WHITE PETROLATUM: 1.75 OINTMENT TOPICAL at 21:18

## 2023-03-27 RX ADMIN — ALBUTEROL SULFATE 2 PUFF: 90 AEROSOL, METERED RESPIRATORY (INHALATION) at 17:06

## 2023-03-27 RX ADMIN — Medication 6000 UNITS: at 17:06

## 2023-03-27 RX ADMIN — FLUTICASONE PROPIONATE 1 SPRAY: 50 SPRAY, METERED NASAL at 08:32

## 2023-03-27 RX ADMIN — QUETIAPINE FUMARATE 600 MG: 300 TABLET ORAL at 21:18

## 2023-03-27 RX ADMIN — NICOTINE POLACRILEX 4 MG: 4 GUM, CHEWING ORAL at 08:32

## 2023-03-27 RX ADMIN — CETIRIZINE HYDROCHLORIDE 10 MG: 10 TABLET, FILM COATED ORAL at 08:24

## 2023-03-27 RX ADMIN — BUSPIRONE HYDROCHLORIDE 7.5 MG: 7.5 TABLET ORAL at 14:08

## 2023-03-27 RX ADMIN — Medication 1 LOZENGE: at 22:05

## 2023-03-27 RX ADMIN — Medication 6000 UNITS: at 12:47

## 2023-03-27 RX ADMIN — NICOTINE POLACRILEX 4 MG: 4 GUM, CHEWING ORAL at 21:18

## 2023-03-27 RX ADMIN — BUSPIRONE HYDROCHLORIDE 7.5 MG: 7.5 TABLET ORAL at 08:24

## 2023-03-27 RX ADMIN — NICOTINE POLACRILEX 4 MG: 4 GUM, CHEWING ORAL at 14:08

## 2023-03-27 ASSESSMENT — ACTIVITIES OF DAILY LIVING (ADL)
ADLS_ACUITY_SCORE: 28
ADLS_ACUITY_SCORE: 28
ORAL_HYGIENE: INDEPENDENT
ADLS_ACUITY_SCORE: 28
LAUNDRY: WITH SUPERVISION
ADLS_ACUITY_SCORE: 28
ORAL_HYGIENE: INDEPENDENT
ADLS_ACUITY_SCORE: 28
HYGIENE/GROOMING: INDEPENDENT
ADLS_ACUITY_SCORE: 28
ADLS_ACUITY_SCORE: 28
DRESS: INDEPENDENT
HYGIENE/GROOMING: INDEPENDENT
DRESS: INDEPENDENT
ADLS_ACUITY_SCORE: 28
ADLS_ACUITY_SCORE: 28
LAUNDRY: WITH SUPERVISION

## 2023-03-27 NOTE — PLAN OF CARE
Problem: Plan of Care - These are the overarching goals to be used throughout the patient stay.    Goal: Plan of Care Review  Description: The Plan of Care Review/Shift note should be completed every shift.  The Outcome Evaluation is a brief statement about your assessment that the patient is improving, declining, or no change.  This information will be displayed automatically on your shift note.  Outcome: Progressing   Goal Outcome Evaluation:    Plan of Care Reviewed With: patient        Pt observed in the milieu, watching television, reading a novel and socialized with peers. Pt also took a nap during the evening shift. Pt well kept and groomed and did showered during the evening shift. Pt denies all mental health psych symptoms and contracted for safety. Pt presented with flat and blunted affect but fairly brighten with interaction. Pt refused HS seroquel stated it cause rash on her face. Pt face assessed and no rash was seen. This writer explained the important of taken her medication and consequences of not taking it and pt stated just write that I refused. Pt then took prn trazodone 100 mg for sleep and went to bed. Adequate fluid and food intake, voiding freely and no BM issue per pt. Even breathing pattern and vital sign stable. /85 (BP Location: Right arm)   Pulse 106   Temp 98.8  F (37.1  C) (Oral)   Resp 16   Ht 1.524 m (5')   Wt 87.5 kg (192 lb 12.8 oz)   SpO2 99%   BMI 37.65 kg/m

## 2023-03-27 NOTE — PLAN OF CARE
"Pt is visible in the lounge this morning and somewhat social with peers. Presents with flat affect but improves upon interaction. Pt declined protonix stating it is \"ineffective\" and states omeprazole worked \"perfect\" and wants to switch back. Pt also declined her seroquel last evening and states this is because seroquel is also \"ineffective\" for her. Pt states in place of seroquel she has been using gabapentin and trazodone and this is working \"better\". Pt states she believes that gabapentin is working as a mood stabilizer for her. Writer explained that she is taking gabapentin only as needed for anxiety right now. Pt having poor insight and judgment and cannot be convinced away from her own thought process. Upon assessment, pt denies all mental health symptoms including sadness or depression, racing thoughts, SI/SIB/HI/AVH. Pt states she feels she has a \"cold\" and started having a \"cough\" last evening. Writer has not observed any coughing this morning. VSS, afebrile. Pt is medication compliant this shift with exception of protonix. Appetite and fluid intake adequate. Pt denies acute pain. No medication side effects reported or observed this shift.     PRN medications utilized this shift include:  Nicotine gum x2  Albuterol inhaler x1-pt states she feels like she has \"cold\" - effective    "

## 2023-03-27 NOTE — PLAN OF CARE
03/27/23 1822   Group Therapy Session   Group Attendance attended group session   Time Session Began 1605   Time Session Ended 1700   Total Time (minutes) 55   Total # Attendees 6   Group Type task skill;recreation   Group Topic Covered problem-solving;cognitive activities;leisure exploration/use of leisure time;structured socialization   Patient Participation Detail   OT: Cognitive activity via leisure task for attention, sequencing, problem-solving, new learning, retention, reality-orientation, social engagement, leisure exploration and coping with symptoms.  Pt Response: Pt presented with positive mood. Pt had some difficulty tracking the activity, but was receptive to gentle cueing from staff/peers. Pt appeared to enjoy the abstract leisure task, evidenced by brightened affect and socialization with peers.

## 2023-03-27 NOTE — PLAN OF CARE
Problem: Sleep Disturbance  Goal: Adequate Sleep/Rest  Outcome: Progressing   Goal Outcome Evaluation:    Patient slept 6.5 during the shift. Patient had unlabored respiration noted. Safety checked done every 15 minues. No behavioral concerns.

## 2023-03-27 NOTE — PROGRESS NOTES
"[facility], [place]   Psychiatric Progress Note  Hospital Day: 6        Interim History:   The patient's care was discussed with the treatment team during the daily team meeting and/or staff's chart notes were reviewed.  Staff report patient has been labile in presentation, at times bright, irritable in others, declined HS quetiapine last night, was started on PPI over weekend for GI upset, attending some groups, having some difficulty with sleep, receiving PRNs for this, slept 6.5 hours last night.     Upon interview, pt had just completed group, reports her mood is \"good\", she was bright and laughing during interview. Reviewed her declining quetiapine, she states she was given the \"right ones\" on [facility], states the ones she gets here are yellow and incorrect, attempted to provide education about different manufacturers but patient insists on taking \"white\" pills, writer will check with PharmD, she states she will take the quetiapine if they are white pills. She denies having any SI or HI, denies AVH. She continues to have some dry skin and reports vanicream did not arrive to unit, agrees to try aquaphor, showed writer area of dry skin on heels, will use this at bedtime. Also reports Protonix does not help her reflux and requests change back to omeprazole. She also reports having a cough yesterday, throat a little sore, asking for something for this, agrees to try cepacol lozenge. No additional concerns.     Writer discussed pts medications with pharmD, the IR quetiapine 300mg tablets are white in color, will change patient from XR to IR to promote adherence. Also appears patient had a [facility]ons order which , discussing with team and looking into forms to re-file for Lyons with pts ongoing commitment.          Medications:       busPIRone  7.5 mg Oral TID     cetirizine  10 mg Oral Daily     fluticasone  1 spray Both Nostrils BID     lactase  6,000 Units Oral TID w/meals     " nicotine  1 patch Transdermal Daily     nicotine   Transdermal Q8H     pantoprazole  40 mg Oral QAM AC     prenatal multivitamin w/iron  1 tablet Oral Daily     QUEtiapine  600 mg Oral At Bedtime          Allergies:     Allergies   Allergen Reactions     Haloperidol Other (See Comments)     Shakes      Lac Bovis Anaphylaxis     Throat swells up      Morphine Hives     Dust Mite Extract      Watery eyes and runny nose     Pollen Extract      Watery eyes and stuffy nose     Zyprexa [Olanzapine]      Skin rash, wants to talk to her MD about it          Labs:     No results found for this or any previous visit (from the past 48 hour(s)).       Psychiatric Examination:     /85 (BP Location: Right arm)   Pulse 106   Temp 98.8  F (37.1  C) (Oral)   Resp 16   Ht 1.524 m (5')   Wt 87.5 kg (192 lb 12.8 oz)   SpO2 99%   BMI 37.65 kg/m    Weight is 192 lbs 12.8 oz  Body mass index is 37.65 kg/m .    Orthostatic Vitals       Most Recent      Sitting Orthostatic /87 03/27 0901    Sitting Orthostatic Pulse (bpm) 95 03/27 0901    Standing Orthostatic /89 03/27 0901    Standing Orthostatic Pulse (bpm) 97 03/27 0901        Appearance: awake, alert and adequately groomed  Attitude:  somewhat cooperative  Eye Contact:  fair  Mood:  labile, either very bright or very irritable  Affect:  mood congruent, labile, during interaction with writer bright and laughing   Speech:  loud volume, normal prosody  Language: fluent and intact in English  Psychomotor, Gait, Musculoskeletal:  no evidence of tardive dyskinesia, dystonia, or tics  Thought Process:  tangential  Associations:  no loose associations  Thought Content:  no evidence of suicidal ideation or homicidal ideation and denies AVH, appears paranoid/guarded  Insight:  limited  Judgement:  limited  Oriented to:  time, person, and place  Attention Span and Concentration:  limited  Recent and Remote Memory:  limited  Fund of Knowledge:  appropriate    Clinical  Global Impressions  First:  Considering your total clinical experience with this particular patient population, how severe are the patient's symptoms at this time?: 7 (03/21/23 1752)  Compared to the patient's condition at the START of treatment, this patient's condition is: 4 (03/21/23 1752)  Most recent:  Considering your total clinical experience with this particular patient population, how severe are the patient's symptoms at this time?: 7 (03/21/23 1752)  Compared to the patient's condition at the START of treatment, this patient's condition is: 4 (03/21/23 1752)         Precautions:     Behavioral Orders   Procedures     Cheeking Precautions (behavioral units)     Code 1 - Restrict to Unit     Elopement precautions     Routine Programming     As clinically indicated     Status 15     Every 15 minutes.          Diagnoses:      Schizoaffective disorder, depressed type vs bipolar type, with acute decompensation in psychosis symptoms and some mood instability (provisional)  Unspecified anxiety   Polysubstance use disorder including opioid and methamphetamine   Nicotine use disorder  R/O substance induced psychosis   Seasonal allergies and rhinitis   Asthma   Obesity   Type 2 DM poorly controlled          Assessment & Plan:   Assessment and hospital summary:  This patient is a 27 year old female under commitment in [facility] with history of psychosis and polysubstance use who presented to ED from [facility] due to concerns for worsening psychosis despite medication changes and inability to engage in program. She was discharged from , ED staff informed patients PD was revoked and she is now being admitted under committed status with plan to discharge to IR once stable per her county CM. She was not cooperative with admission interview, majority of history obtained from chart review, will continue current medications as noted in chart, ordered admission labs, plan to coordinate with patients CM regarding  disposition as patient stabilizes.      Inpatient psychiatric hospitalization is warranted at this time for safety, stabilization, and possible adjustment in medications.    Psychiatric treatment/inteventions:  Medications:   -change PTA quetiapine to IR formulation at 600mg at bedtime with additional IR 100mg BID PRN for agitation and psychosis (see above)  -continue PTA buspirone 7.5mg TID for anxiety, plan to increase as indicated/toerlated     -continue PRN benzotropine 1mg BID for possible EPSE   -PRN hydroxyzine 25mg every 4 hour for anxiety  -continue PRN trazodone 100mg at bedtime for sleep   -PRN thorazine 10mg PO or 25mg IM with benadryl 50mg PO or IM TID for agitation/psychosis (pt has olanzapine listed as allergy)    -will also order PRN aquaphor for dry skin as PRN vanicream not available. Continue Lactaid for lactose intolerance, nicotine patch and prenatal vitamin per pts request    The risks, benefits, alternatives and side effects have been discussed and are understood by the patient.     Laboratory/Imaging: no new labs ordered      Patient will be treated in therapeutic milieu with appropriate individual and group therapies as described.     Medical treatment/interventions:  Medical concerns: Pt denying acute medical concerns, will continue PTA flonase and zyrtec for allergies, PRN albuterol inhaler for asthma and nicotine gum for nicotine withdrawal. Elevated HgbA1c and abnormal lipid panel, IM consult placed 3/23, per note:   Asha Arrieta is a 27 year old female admitted on 3/15/2023. She has a past medical history of T2DM, schizoaffective disorder (depressed versus bipolar type), polysubstance use disorder (opioid, methamphetamine, tobacco), seasonal allergies, asthma who was admitted after presenting to the ED from [facility] for worsening psychosis.  Medicine consulted for diabetes management.     Type 2 Diabetes Mellitus, non-insulin-dependent, poorly controlled  A1c this morning 8.1%.  "Prior to this was 7.4% on 10/17/2022 in Care Everywhere. According to outside medication record, patient had been on Metformin 500 mg daily in the past but stated that she had side effects and would not elaborate further.  Limited data available for review, but glucose trends appear to be elevated above goal (190s-250s).  Patient unwilling to discuss diabetes with writer or \"anyone from this hospital\".  She vehemently denied having diabetes despite writer discussing that her A1c currently categorizes her as having diabetes, and became uncooperative.   - Discussed patient case with Dr. Donal Abrams. Should the patient be open to discussing diabetes with Internal Medicine, we are happy to come back and discuss management with the patient.  - Follow-up with PCP otherwise     Hyperlipidemia, mixed  Lipid panel drawn per psychiatry on admission showing low HDL, high LDL, high triglycerides.  - Follow-up with PCP within 1-2 weeks after discharge     Medicine will sign off. No further recommendations at this time.  Please feel free to reconsult if any new medical issues or concerns.         The patient's care was discussed with the Bedside Nurse and Patient, Primary Team (Dr. Katie Abrams).        Clinically Significant Risk Factors []Expand by Default                         # DMII: A1C = 8.1 % (Ref range: <5.7 %) within past 6 months, PRESENT ON ADMISSION  # Obesity: Estimated body mass index is 37.09 kg/m  as calculated from the following:    Height as of this encounter: 1.524 m (5').    Weight as of this encounter: 86.1 kg (189 lb 14.4 oz)., PRESENT ON ADMISSION             Toby Rendon PA-C  Hospitalist Service      Disposition Plan   Reason for ongoing admission: is unable to care for self due to severe psychosis or luli  Discharge [email or web address]  Discharge Medications: not ordered  Follow-up Appointments: not scheduled  Legal Status: full commitment, appears [facility] , CTC looking into " process for filing new [facility] with current commitment     >50min total time that was spent in counseling and coordination of care with staff, reviewing medical record, educating patient about treatment options, side effects and benefits and alternative treatments for medications, providing supportive therapy and redirection regarding above symptoms.     This document is created with the help of Dragon dictation system.  All grammatical/typing errors or context distortion are unintentional and inherent to software.    Patient has been seen and evaluated by me, Katie Abrams DO.

## 2023-03-27 NOTE — PLAN OF CARE
"Assessment/Intervention/Current Symtoms and Care Coordination:  -Chart review  -Team meeting - nurse reports that Walter refused her Seroquel, is variably irritable, present in milieu.  Team discussed need for Lyons as past one .    WR met with Walter briefly in her room this afternoon. WR attempted to engage Asha in conversation. Asha gave brief answers, WR stated she would assist the Critical access hospital  with referrals, Asha stated \"there are no referrals. I am staying here for the commitment\" and walked away from the WR. WR chose not to challenge this, will continue to work to build trust and rapport.    WR followed up with two IRTS referrals made by [facility] access hospital, see updates below.    WR emailed  [facility], gave update on conversation with Asha and plan for Lyons.     Current Symptoms include the following: Psychosis, disorganization, patient is irritable and paranoia  Precautions: Elopement and Cheeking     Discharge Plan or Goal:  Pending stabilization & development of a safe discharge plan.  Considerations include: IRTS with plan to pursue group home placement     Barriers to Discharge:  Asha presents with disorganized thought process and paranoia. She requires symptom stabilization, medication management, and supportive discharge plan.      Referral Status:  IRTS referrals (initiated by  [facility])    [facility]: [address] [place] [address] (WR left msg looking for update on 3/27)  [place] [address]  [facility]  Phone:  [phone]  Fax:  [phone]    [facility]: (Declined due to program feels Walter requires higher level of care)  [address] [place] [address]  Phone: [phone]    Legal Status:  Patient is under an MICD Commitment in [facility]   Case No. [id number]     Contacts:    [facility]  - Sanford Marinelli (phone: [phone], email: [email or web address])   "   Upcoming Meetings/Important Dates:  N/A     Rationale for SIO/No Roommate Order:  Patient is not on SIO.  Patient has current roommate.

## 2023-03-28 PROCEDURE — H2032 ACTIVITY THERAPY, PER 15 MIN: HCPCS

## 2023-03-28 PROCEDURE — 250N000013 HC RX MED GY IP 250 OP 250 PS 637: Performed by: PSYCHIATRY & NEUROLOGY

## 2023-03-28 PROCEDURE — 99233 SBSQ HOSP IP/OBS HIGH 50: CPT | Performed by: PSYCHIATRY & NEUROLOGY

## 2023-03-28 PROCEDURE — G0177 OPPS/PHP; TRAIN & EDUC SERV: HCPCS

## 2023-03-28 PROCEDURE — 124N000002 HC R&B MH UMMC

## 2023-03-28 RX ORDER — BENZOYL PEROXIDE 5 G/100G
GEL TOPICAL AT BEDTIME
Status: DISCONTINUED | OUTPATIENT
Start: 2023-03-28 | End: 2023-04-11 | Stop reason: HOSPADM

## 2023-03-28 RX ADMIN — BUSPIRONE HYDROCHLORIDE 7.5 MG: 7.5 TABLET ORAL at 20:32

## 2023-03-28 RX ADMIN — FLUTICASONE PROPIONATE 1 SPRAY: 50 SPRAY, METERED NASAL at 20:32

## 2023-03-28 RX ADMIN — CETIRIZINE HYDROCHLORIDE 10 MG: 10 TABLET, FILM COATED ORAL at 08:45

## 2023-03-28 RX ADMIN — OMEPRAZOLE 20 MG: 20 CAPSULE, DELAYED RELEASE ORAL at 08:50

## 2023-03-28 RX ADMIN — ALBUTEROL SULFATE 2 PUFF: 90 AEROSOL, METERED RESPIRATORY (INHALATION) at 08:54

## 2023-03-28 RX ADMIN — NICOTINE POLACRILEX 4 MG: 4 GUM, CHEWING ORAL at 12:29

## 2023-03-28 RX ADMIN — BUSPIRONE HYDROCHLORIDE 7.5 MG: 7.5 TABLET ORAL at 08:45

## 2023-03-28 RX ADMIN — FLUTICASONE PROPIONATE 1 SPRAY: 50 SPRAY, METERED NASAL at 08:49

## 2023-03-28 RX ADMIN — BUSPIRONE HYDROCHLORIDE 7.5 MG: 7.5 TABLET ORAL at 13:32

## 2023-03-28 RX ADMIN — BENZOYL PEROXIDE: 50 GEL TOPICAL at 20:32

## 2023-03-28 RX ADMIN — Medication 6000 UNITS: at 08:45

## 2023-03-28 RX ADMIN — Medication 1 LOZENGE: at 12:29

## 2023-03-28 RX ADMIN — Medication 1 LOZENGE: at 19:25

## 2023-03-28 RX ADMIN — NICOTINE POLACRILEX 4 MG: 4 GUM, CHEWING ORAL at 08:48

## 2023-03-28 RX ADMIN — WHITE PETROLATUM: 1.75 OINTMENT TOPICAL at 20:32

## 2023-03-28 RX ADMIN — ALBUTEROL SULFATE 2 PUFF: 90 AEROSOL, METERED RESPIRATORY (INHALATION) at 06:44

## 2023-03-28 RX ADMIN — Medication 6000 UNITS: at 12:29

## 2023-03-28 RX ADMIN — NICOTINE POLACRILEX 4 MG: 4 GUM, CHEWING ORAL at 20:31

## 2023-03-28 RX ADMIN — MELATONIN TAB 3 MG 3 MG: 3 TAB at 20:33

## 2023-03-28 RX ADMIN — Medication: at 08:50

## 2023-03-28 RX ADMIN — GABAPENTIN 300 MG: 300 CAPSULE ORAL at 16:10

## 2023-03-28 RX ADMIN — NICOTINE POLACRILEX 4 MG: 4 GUM, CHEWING ORAL at 16:10

## 2023-03-28 RX ADMIN — Medication 6000 UNITS: at 17:54

## 2023-03-28 RX ADMIN — Medication 1 LOZENGE: at 18:17

## 2023-03-28 RX ADMIN — GABAPENTIN 300 MG: 300 CAPSULE ORAL at 08:45

## 2023-03-28 RX ADMIN — PRENATAL VITAMINS-IRON FUMARATE 27 MG IRON-FOLIC ACID 0.8 MG TABLET 1 TABLET: at 08:45

## 2023-03-28 RX ADMIN — Medication 1 LOZENGE: at 06:48

## 2023-03-28 RX ADMIN — NICOTINE 1 PATCH: 21 PATCH, EXTENDED RELEASE TRANSDERMAL at 08:46

## 2023-03-28 RX ADMIN — TRAZODONE HYDROCHLORIDE 100 MG: 50 TABLET ORAL at 20:33

## 2023-03-28 ASSESSMENT — ACTIVITIES OF DAILY LIVING (ADL)
ADLS_ACUITY_SCORE: 28
ORAL_HYGIENE: INDEPENDENT
ORAL_HYGIENE: INDEPENDENT
ADLS_ACUITY_SCORE: 29
ADLS_ACUITY_SCORE: 29
DRESS: STREET CLOTHES;INDEPENDENT
ADLS_ACUITY_SCORE: 28
HYGIENE/GROOMING: INDEPENDENT
LAUNDRY: WITH SUPERVISION
ADLS_ACUITY_SCORE: 28
ADLS_ACUITY_SCORE: 28
HYGIENE/GROOMING: INDEPENDENT
DRESS: INDEPENDENT
ADLS_ACUITY_SCORE: 29
ADLS_ACUITY_SCORE: 28

## 2023-03-28 NOTE — PLAN OF CARE
Pt appeared asleep for 6.25 hours. No medical and behavioral concerns noted. Remains 15 minutes safety check. Requests syrup for cough; sticky note left to providers. Will continue POC.

## 2023-03-28 NOTE — PROGRESS NOTES
"[facility], [place]   Psychiatric Progress Note  Hospital Day: 7        Interim History:   The patient's care was discussed with the treatment team during the daily team meeting and/or staff's chart notes were reviewed.  Staff report patient visible in the milieu, social with peers, took a shower, had slightly elevated blood pressure, requested cough syrup though not observed coughing, received several PRNs including trazodone for sleep, slept 6.25 hours.     Upon interview, patient was out in milieu eating cereal, wanting to meet while she watches TV, no other patients present in [facility], declined to meet in more private area. Reports her mood is \"good\", she denied having any SI or HI, denies AVH. Reports that she tolerated taking the quetiapine last night as it was \"the right color\". Writer apologized for not ordering a topical for acne that pt had requested yesterday and assured her order was placed to start tonight. She reports she is feeling well physically. She plans to go to groups today. No other questions/concerns she wished to discuss.     Writer completed a petition for Lyons for patient as it appears previous Lyons , patient is still under active commitment, completed Neuroleptic forms with request for: Thorazine, quetiapine, aripiprazole and risperidone.          Medications:       benzoyl peroxide   Topical At Bedtime     busPIRone  7.5 mg Oral TID     cetirizine  10 mg Oral Daily     fluticasone  1 spray Both Nostrils BID     lactase  6,000 Units Oral TID w/meals     mineral oil-hydrophilic petrolatum   Topical At Bedtime     nicotine  1 patch Transdermal Daily     nicotine   Transdermal Q8H     omeprazole  20 mg Oral Daily     prenatal multivitamin w/iron  1 tablet Oral Daily     QUEtiapine  600 mg Oral At Bedtime          Allergies:     Allergies   Allergen Reactions     Haloperidol Other (See Comments)     Shakes      Lac Bovis Anaphylaxis     Throat swells up  "     Morphine Hives     Dust Mite Extract      Watery eyes and runny nose     Pollen Extract      Watery eyes and stuffy nose     Zyprexa [Olanzapine]      Skin rash, wants to talk to her MD about it          Labs:     No results found for this or any previous visit (from the past 48 hour(s)).       Psychiatric Examination:     BP (!) 137/92 (BP Location: Left arm, Patient Position: Sitting)   Pulse 105   Temp 97.9  F (36.6  C) (Oral)   Resp 16   Ht 1.524 m (5')   Wt 87.5 kg (192 lb 12.8 oz)   SpO2 100%   BMI 37.65 kg/m    Weight is 192 lbs 12.8 oz  Body mass index is 37.65 kg/m .    Orthostatic Vitals       Most Recent      Sitting Orthostatic /85 03/28 0902    Sitting Orthostatic Pulse (bpm) 100 03/28 0902    Standing Orthostatic /85 03/28 0902    Standing Orthostatic Pulse (bpm) 113 03/28 0902        Appearance: awake, alert and adequately groomed  Attitude:  somewhat cooperative  Eye Contact:  fair  Mood:  labile, either very bright or very irritable  Affect:  mood congruent, continues to be labile in interactions, more calm today   Speech:  appropriate volume, normal prosody  Language: fluent and intact in English  Psychomotor, Gait, Musculoskeletal:  no evidence of tardive dyskinesia, dystonia, or tics  Thought Process:  tangential, illogical around treatment/medications  Associations:  no loose associations  Thought Content:  no evidence of suicidal ideation or homicidal ideation and denies AVH, appears paranoid/guarded  Insight:  limited  Judgement:  limited  Oriented to:  time, person, and place  Attention Span and Concentration:  limited  Recent and Remote Memory:  limited  Fund of Knowledge:  appropriate    Clinical Global Impressions  First:  Considering your total clinical experience with this particular patient population, how severe are the patient's symptoms at this time?: 7 (03/21/23 9391)  Compared to the patient's condition at the START of treatment, this patient's condition is:  "4 (03/21/23 1752)  Most recent:  Considering your total clinical experience with this particular patient population, how severe are the patient's symptoms at this time?: 7 (03/21/23 1752)  Compared to the patient's condition at the START of treatment, this patient's condition is: 4 (03/21/23 1752)         Precautions:     Behavioral Orders   Procedures     Cheeking Precautions (behavioral units)     Code 1 - Restrict to Unit     Elopement precautions     Routine Programming     As clinically indicated     Status 15     Every 15 minutes.          Diagnoses:      Schizoaffective disorder, depressed type vs bipolar type, with acute decompensation in psychosis symptoms and some mood instability (provisional)  Unspecified anxiety   Polysubstance use disorder including opioid and methamphetamine   Nicotine use disorder  R/O substance induced psychosis   Seasonal allergies and rhinitis   Asthma   Obesity   Type 2 DM poorly controlled          Assessment & Plan:   Assessment and hospital summary:  This patient is a 27 year old female under commitment in [facility] with history of psychosis and polysubstance use who presented to ED from [facility] due to concerns for worsening psychosis despite medication changes and inability to engage in program. She was discharged from , ED staff informed patients PD was revoked and she is now being admitted under committed status with plan to discharge to [facility] once stable per her county CM. She was not cooperative with admission interview, majority of history obtained from chart review, will continue current medications as noted in chart, ordered admission labs, plan to coordinate with patients CM regarding disposition as patient stabilizes. Patient has continued to show limited insight into psychiatric symptoms and need for treatment, is illogical in requests around medications and willingness to take medications, fixated on the color of medications or concerned they are \"the " "right one\" which appears based in paranoia/delusions. Appears Lyons had , writer submitted request for a new Lyons 3/28/23.      Inpatient psychiatric hospitalization is warranted at this time for safety, stabilization, and possible adjustment in medications.    Psychiatric treatment/inteventions:  Medications:   -continue PTA quetiapine (now in IR formulation) at 600mg at bedtime with additional IR 100mg BID PRN for agitation and psychosis (changed from XR due to patient declining to take XR medications due to pills being yellow in color)  -continue PTA buspirone 7.5mg TID for anxiety, plan to increase as indicated/toerlated     -continue PRN benzotropine 1mg BID for possible EPSE   -PRN hydroxyzine 25mg every 4 hour for anxiety  -continue PRN trazodone 100mg at bedtime for sleep   -PRN thorazine 10mg PO or 25mg IM with benadryl 50mg PO or IM TID for agitation/psychosis (pt has olanzapine listed as allergy)    -also ordered PRN aquaphor for dry skin as PRN vanicream not available. Continue Lactaid for lactose intolerance, nicotine patch and prenatal vitamin per pts request    The risks, benefits, alternatives and side effects have been discussed and are understood by the patient.     Laboratory/Imaging: no new labs ordered      Patient will be treated in therapeutic milieu with appropriate individual and group therapies as described.     Medical treatment/interventions:  Medical concerns: Pt denying acute medical concerns, will continue PTA flonase and zyrtec for allergies, PRN albuterol inhaler for asthma and nicotine gum for nicotine withdrawal. Elevated HgbA1c and abnormal lipid panel, IM consult placed 3/23, per note:   Asha Arrieta is a 27 year old female admitted on 3/15/2023. She has a past medical history of T2DM, schizoaffective disorder (depressed versus bipolar type), polysubstance use disorder (opioid, methamphetamine, tobacco), seasonal allergies, asthma who was admitted after presenting " "to the ED from [facility] for worsening psychosis.  Medicine consulted for diabetes management.     Type 2 Diabetes Mellitus, non-insulin-dependent, poorly controlled  A1c this morning 8.1%. Prior to this was 7.4% on 10/17/2022 in Care Everywhere. According to outside medication record, patient had been on Metformin 500 mg daily in the past but stated that she had side effects and would not elaborate further.  Limited data available for review, but glucose trends appear to be elevated above goal (190s-250s).  Patient unwilling to discuss diabetes with writer or \"anyone from this hospital\".  She vehemently denied having diabetes despite writer discussing that her A1c currently categorizes her as having diabetes, and became uncooperative.   - Discussed patient case with Dr. Donal Abrams. Should the patient be open to discussing diabetes with Internal Medicine, we are happy to come back and discuss management with the patient.  - Follow-up with PCP otherwise     Hyperlipidemia, mixed  Lipid panel drawn per psychiatry on admission showing low HDL, high LDL, high triglycerides.  - Follow-up with PCP within 1-2 weeks after discharge     Medicine will sign off. No further recommendations at this time.  Please feel free to reconsult if any new medical issues or concerns.         The patient's care was discussed with the Bedside Nurse and Patient, Primary Team (Dr. Katie Abrams).        Clinically Significant Risk Factors []Expand by Default                         # DMII: A1C = 8.1 % (Ref range: <5.7 %) within past 6 months, PRESENT ON ADMISSION  # Obesity: Estimated body mass index is 37.09 kg/m  as calculated from the following:    Height as of this encounter: 1.524 m (5').    Weight as of this encounter: 86.1 kg (189 lb 14.4 oz)., PRESENT ON ADMISSION             Toby Rendon PA-C  Hospitalist Service      Disposition Plan   Reason for ongoing admission: is unable to care for self due to severe psychosis " or luli  Discharge location: IRTS facility  Discharge Medications: not ordered  Follow-up Appointments: not scheduled  Legal Status: full commitment, appears Lyons , CTC looking into process for filing new Lyons with current commitment, new Lyons request sent to [facility] 3/28, requested thorazine, quetiapine, aripiprazole, risperidone     >50min total time that was spent in counseling and coordination of care with staff, reviewing medical record, educating patient about treatment options, side effects and benefits and alternative treatments for medications, providing supportive therapy and redirection regarding above symptoms and completing paperwork for Lyons request.     This document is created with the help of Dragon dictation system.  All grammatical/typing errors or context distortion are unintentional and inherent to software.    Patient has been seen and evaluated by me, Katie Abrams DO.

## 2023-03-28 NOTE — PLAN OF CARE
"Assessment/Intervention/Current Symtoms and Care Coordination:  -Chart review  -Team meeting - nurse  took all her medications with some encouragment, slept 6.25, is social with select staff and select peers, BP was slightly elevated, is variably irritable with staff.     WR met with Asha briefly in [facility]. WR attempted to engage, Asha was suspicious, made little eye contact, asked WR what purpose of talking was. WR asked if Asha would prefer for WR not to check in. Asha said \"well your trying to make me go to an IRTS, and I'm not going to.\" WR explained that she was taking lead of [facility]  [facility]. Asha stated she did not need to follow what [facility] said either and that her commitment was up on May 2nd and that she is planning to stay in the hospital until that time. WR stated that this was not what WR had been told, but that where Ahsa goes after hosptial was not up to WR, and that WR is just trying to support and help Asha. Asha did not reply and did not look at WR again. WR will continue to work to build trust and rapport.     WR submitted completed Lyons form to [facility].     Current Symptoms include the following: Psychosis, disorganization, patient is irritable and paranoia  Precautions: Elopement and Cheeking     Discharge Plan or Goal:  Pending stabilization & development of a safe discharge plan.  Considerations include: IRTS with plan to pursue group home placement     Barriers to Discharge:  Asha presents with disorganized thought process and paranoia. She requires symptom stabilization, medication management, and supportive discharge plan.      Referral Status:  IRTS referrals (initiated by  [facility])     [facility]: [address] [facility] (WR left msg looking for update on 3/27)  [place] [address]  [facility]  Phone:  [phone]  Fax:  [phone]     [facility]: (Declined due to program feels [facility] " requires higher level of care)  [address] [place] [address]  Phone: [phone]     Legal Status:  Patient is under an MICD Commitment in [facility]   Case No. [id number]     Contacts:    [facility]  - Sanford Marinelli (phone: [phone], email: [email or web address])     Upcoming Meetings/Important Dates:  N/A     Rationale for SIO/No Roommate Order:  Patient is not on SIO.  Patient has current roommate.

## 2023-03-28 NOTE — PLAN OF CARE
"Pt is visible in the lounge and social with peers. Pt attended evening group. Pt presents with flat affect, but brightens upon interactions with staff and peers. Pt removed nicotine patch at 1900 prior to taking a shower. Pt denies all MH symptoms including depression, anxiety, SI/SIB/HI/AVH. Pt is medication compliant. Appetite and fluid intake adequate. VS showed slightly elevated diastolic BP this afternoon 137/92. Pt denies headache or vision changes at this time. No medication side effects reported or observed this shift. Pt was asking about a cream for her acne on her face and was directed to speak with provider about this tomorrow. Pt is accepting. Pt reports no further concerns at this time.    PRN medications utilized this shift include:  Nicotine gum x1  cepacol lozenge-sore throat-effective  Albuterol inhaler x2-pt reports feeling \"tight chest\"- effective in relieving tightness  Ibuprofen 600 mg- complaints of pain on back of head \"from having my hair up all day\", also given warm pack, both are effective  Trazodone 100 mg at HS-sleep-effective    "

## 2023-03-28 NOTE — PLAN OF CARE
Patient visible in milieu watching TV with other peers, attended groups during the shift. Presents with flat affect, labile mood. Endorsed anxiety 6/10, requested for Gabapentin PRN see MAR and was effective per pt. Denies SI/SIB/AVH, denies pain. Compliant with medication, no side effects noted/reported. Utilized Nicotine lozenge PRN as per orders. Was given Lozenge for sore throat PRN and was effective. Was noted disorganized and forgetting with some reminders.   Plan: Continue with current POC and status 15 checks.       Goal Outcome Evaluation:    Plan of Care Reviewed With: patient Plan of Care Reviewed With: patient    Overall Patient Progress: improvingOverall Patient Progress: improving

## 2023-03-29 PROCEDURE — 250N000011 HC RX IP 250 OP 636: Performed by: PSYCHIATRY & NEUROLOGY

## 2023-03-29 PROCEDURE — 90853 GROUP PSYCHOTHERAPY: CPT

## 2023-03-29 PROCEDURE — 250N000013 HC RX MED GY IP 250 OP 250 PS 637: Performed by: PSYCHIATRY & NEUROLOGY

## 2023-03-29 PROCEDURE — G0177 OPPS/PHP; TRAIN & EDUC SERV: HCPCS

## 2023-03-29 PROCEDURE — 124N000002 HC R&B MH UMMC

## 2023-03-29 RX ADMIN — Medication 6000 UNITS: at 17:39

## 2023-03-29 RX ADMIN — Medication 1 LOZENGE: at 20:32

## 2023-03-29 RX ADMIN — Medication 6000 UNITS: at 12:05

## 2023-03-29 RX ADMIN — BUSPIRONE HYDROCHLORIDE 7.5 MG: 7.5 TABLET ORAL at 22:25

## 2023-03-29 RX ADMIN — QUETIAPINE FUMARATE 600 MG: 300 TABLET ORAL at 22:24

## 2023-03-29 RX ADMIN — FLUTICASONE PROPIONATE 1 SPRAY: 50 SPRAY, METERED NASAL at 22:26

## 2023-03-29 RX ADMIN — ONDANSETRON 4 MG: 4 TABLET, ORALLY DISINTEGRATING ORAL at 09:43

## 2023-03-29 RX ADMIN — NICOTINE POLACRILEX 4 MG: 4 GUM, CHEWING ORAL at 15:13

## 2023-03-29 RX ADMIN — FLUTICASONE PROPIONATE 1 SPRAY: 50 SPRAY, METERED NASAL at 08:48

## 2023-03-29 RX ADMIN — GABAPENTIN 300 MG: 300 CAPSULE ORAL at 22:26

## 2023-03-29 RX ADMIN — BUSPIRONE HYDROCHLORIDE 7.5 MG: 7.5 TABLET ORAL at 08:44

## 2023-03-29 RX ADMIN — Medication 6000 UNITS: at 08:44

## 2023-03-29 RX ADMIN — CETIRIZINE HYDROCHLORIDE 10 MG: 10 TABLET, FILM COATED ORAL at 08:44

## 2023-03-29 RX ADMIN — GABAPENTIN 300 MG: 300 CAPSULE ORAL at 08:44

## 2023-03-29 RX ADMIN — PRENATAL VITAMINS-IRON FUMARATE 27 MG IRON-FOLIC ACID 0.8 MG TABLET 1 TABLET: at 08:44

## 2023-03-29 RX ADMIN — ALBUTEROL SULFATE 2 PUFF: 90 AEROSOL, METERED RESPIRATORY (INHALATION) at 10:21

## 2023-03-29 RX ADMIN — BENZOYL PEROXIDE: 50 GEL TOPICAL at 22:26

## 2023-03-29 RX ADMIN — NICOTINE POLACRILEX 4 MG: 4 GUM, CHEWING ORAL at 17:18

## 2023-03-29 RX ADMIN — Medication 1 LOZENGE: at 22:54

## 2023-03-29 RX ADMIN — WHITE PETROLATUM: 1.75 OINTMENT TOPICAL at 22:29

## 2023-03-29 RX ADMIN — TRAZODONE HYDROCHLORIDE 100 MG: 50 TABLET ORAL at 22:25

## 2023-03-29 RX ADMIN — NICOTINE 1 PATCH: 21 PATCH, EXTENDED RELEASE TRANSDERMAL at 08:46

## 2023-03-29 RX ADMIN — OMEPRAZOLE 20 MG: 20 CAPSULE, DELAYED RELEASE ORAL at 08:50

## 2023-03-29 RX ADMIN — BUSPIRONE HYDROCHLORIDE 7.5 MG: 7.5 TABLET ORAL at 15:04

## 2023-03-29 RX ADMIN — NICOTINE POLACRILEX 4 MG: 4 GUM, CHEWING ORAL at 22:25

## 2023-03-29 RX ADMIN — GABAPENTIN 300 MG: 300 CAPSULE ORAL at 15:13

## 2023-03-29 RX ADMIN — NICOTINE POLACRILEX 4 MG: 4 GUM, CHEWING ORAL at 08:44

## 2023-03-29 ASSESSMENT — ACTIVITIES OF DAILY LIVING (ADL)
ADLS_ACUITY_SCORE: 29
ADLS_ACUITY_SCORE: 29
HYGIENE/GROOMING: INDEPENDENT
ADLS_ACUITY_SCORE: 29
ORAL_HYGIENE: INDEPENDENT
ADLS_ACUITY_SCORE: 29
HYGIENE/GROOMING: INDEPENDENT
ADLS_ACUITY_SCORE: 29
DRESS: STREET CLOTHES;INDEPENDENT
LAUNDRY: WITH SUPERVISION
ADLS_ACUITY_SCORE: 29
ADLS_ACUITY_SCORE: 29
DRESS: INDEPENDENT
ADLS_ACUITY_SCORE: 29
ORAL_HYGIENE: INDEPENDENT
ADLS_ACUITY_SCORE: 29
ADLS_ACUITY_SCORE: 29

## 2023-03-29 NOTE — PLAN OF CARE
03/29/23 1531   Group Therapy Session   Group Attendance attended group session   Time Session Began 0115   Time Session Ended 0215   Total Time (minutes) 60   Total # Attendees 4   Group Type psychotherapeutic   Group Topic Covered emotions/expression;coping skills/lifestyle management   Group Session Detail CTC led process group on the topic of Who are You?  Participants took time to fill out work sheet about what makes them unique, completing statements that begin with  I am a human that . . . .  ex: loves, wants to, has the goal of etc. Then participants took turns choosing a statement to share. Goal was to create a safe environment for getting to know one another and reflect on what makes us unique. Participants also folded paper origami hearts and listened to a meditation on the topic of loving kindness.   Patient Response/Contribution cooperative with task;organized   Patient Participation Detail Asha was an active group participant, she enthusiastically folded paper hearts, contributed to group dialouge.

## 2023-03-29 NOTE — PLAN OF CARE
Pt appeared asleep for 7 hours. No medical and behavioral concerns noted. Remains 15 minutes safety check. Will continue POC.

## 2023-03-29 NOTE — PLAN OF CARE
OCCUPATIONAL THERAPY GROUP NOTE:     03/27/23 1205   Group Therapy Session   Group Attendance attended group session   Time Session Began 1115   Time Session Ended 1200   Total Time (minutes) 45   Group Type task skill   Group Topic Covered structured socialization;leisure exploration/use of leisure time;problem-solving;coping skills/lifestyle management   Group Session Detail OT Clinic Group   Patient Response/Contribution cooperative with task;listened actively;organized   Patient Participation Detail Occupational therapy clinic. Purpose of structured group: exploration/development of positive coping skills, engagement in creative expression and clinical observation of social, cognitive, and kinesthetic performance skills. Pt response: Pt actively participated in occupational therapy clinic. Chosen activity: stenciling a canvas bag. Independent to initiate, gather materials, sequence and adjust to workspace demands as needed. Demonstrated good focus (25 min without interruption), planning, and attention to detail for this moderately complex task. Able to ask for assistance and socialized with peers and staff. Affect appeared to brighten in interactions.

## 2023-03-29 NOTE — PLAN OF CARE
"OCCUPATIONAL THERAPY GROUP NOTE:     03/28/23 1638   Group Therapy Session   Group Attendance attended group session   Time Session Began 1315   Time Session Ended 1400   Total Time (minutes) 45   Total # Attendees 3   Group Type life skill   Group Topic Covered balanced lifestyle;coping skills/lifestyle management;relaxation techniques   Group Session Detail OT Avila Chi Group   Patient Response/Contribution cooperative with task;listened actively;organized   Patient Participation Detail Pt actively participated in a structured occupational therapy group with a focus on coping through movement via Avila Chi as a strategy to facilitate therapeutic exercise, calming, and stress management. Pt actively followed all of the movements, and remained attentive and engaged throughout group. Pt verbalized feeling \"like I want to do more of that\" at the end of group. Pt contributed at least one idea to a discussion at the end of group regarding the benefits of exercise, stretching, and deep breathing.          "

## 2023-03-29 NOTE — PLAN OF CARE
"Assessment/Intervention/Current Symtoms and Care Coordination:  -Chart review    Court order for Lyons Hearing came to unit by fax.  CTC Danyell served Asha with documents, answered questions - clarified this order is specifically about medications, is related to commitment and that zoom hearing would occur on unit. Asha was accepting of this.     Asha came to 's process group this afternoon. Asha's affect and demeanor were noticeably different then in WR's encounter with her on days previous. Asha used a childlike voice and gestures (swinging her legs under chair) and was agreeable and approval seeking throughout group. Asha shared a story that was difficult to follow about \"making gasoline\" in halfway and that \"my kids had to do it to\". WR attempted to clarify but was unable to make out meaning.     Current Symptoms include the following: Psychosis, disorganization, patient is irritable  Precautions: Elopement and Cheeking     Discharge Plan or Goal:  Pending stabilization & development of a safe discharge plan.  Considerations include: IRTS with plan to pursue group home placement     Barriers to Discharge:  Asha presents with disorganized thought process and paranoia. She requires symptom stabilization, medication management, and supportive discharge plan.      Referral Status:  IRTS referrals (initiated by  Sanford)     [facility]: [address] [place] [address] (WR left msg looking for update on 3/27)  [place] [address]  [facility]  Phone:  [phone]  Fax:  [phone]     [facility]: (Declined due to program feels Walter requires higher level of care)  [address] [place] [address]  Phone: [phone]     Legal Status:  Patient is under an MICD Commitment in [facility]   Case No. [id number]     Contacts:  [facility]  - Sanford Marinelli (phone: [phone], email: [email or web address])   "   Upcoming Meetings/Important Dates:  N/A     Rationale for SIO/No Roommate Order:  Patient is not on SIO.  Patient has current roommate.

## 2023-03-29 NOTE — PLAN OF CARE
Problem: Adult Behavioral Health Plan of Care  Goal: Optimized Coping Skills in Response to Life Stressors  Outcome: Progressing   Goal Outcome Evaluation:    Plan of Care Reviewed With: patient          Patient was visible in the milieu watching TV and occasionally in the room reading. Presents with a flat affect, appears tensed, endorsed anxiety, requested and received PRN Gabapentin 300mg. Patient denied depression, SI, HI, SIB, hallucinations but appears guarded during assessment. Insight is limited. Patient reports neck pain, refuse medication intervention, requested and received heat pack. Patient denied any concern with bowel and bladder, appetite is good, patient had an adequate intake for food and fluids. Medication compliant, patient refused scheduled HS Seroquel, requested and received Trazodone 100mg with HS meds and Melatonin 3mg. Nicotine gum amd Benzocaine-Menthol Lozenges for sorethroat/cough were given to patient this shift. No side effects of medications were reported by the patient or observed by the staff RN. Patient attended groups this shift. Will continue with the plan of care.    /86   Pulse 107   Temp 98.8  F (37.1  C) (Oral)   Resp 16   Ht 1.524 m (5')   Wt 87.5 kg (192 lb 12.8 oz)   SpO2 98%   BMI 37.65 kg/m

## 2023-03-29 NOTE — PROGRESS NOTES
03/28/23 1900   Group Therapy Session   Group Attendance attended group session   Time Session Began 1600   Time Session Ended 1650   Total Time (minutes) 45   Total # Attendees 5   Group Type recreation   Group Topic Covered leisure exploration/use of leisure time   Group Session Detail TR leisure group   Patient Response/Contribution cooperative with task   Patient Participation Detail Pt attended the structured Therapeutic Recreation group, participating in a group activity. Pt participated in group discussion, leisure participation, and social engagement to gain self-esteem, manage behaviors, improve social skills, decrease isolation, and reduce anxiety/depression.   Pt remained focused and engaged throughout group activity.  Pt was sociable and was appropriate with interactions with the others in group. Pt was an active participant, contributing to the clues and descriptions throughout the activity.         

## 2023-03-29 NOTE — PROGRESS NOTES
03/29/23 1800   Group Therapy Session   Group Attendance attended group session   Time Session Began 1615   Time Session Ended 1700   Total Time (minutes) 30   Total # Attendees 6   Group Type expressive therapy   Group Topic Covered emotions/expression   Patient Response/Contribution cooperative with task       Art Therapy directive was to create a watercolor painting expressing a recent pleasant experience in which pt felt a positive emotion. Pts were also encouraged to create a Haiku poem to further express experience and emotion.  Goals of directive: DBT skills-emotional expression, emotional regulation, mindfulness  Pt was an engaged participant, focused on task for the full duration of group. Pt finished painting and briefly shared with group. Pt created a painting expressing that pt had a good nights sleep last evening and talked about she felt well and rested.  Pts mood was calm, pleasant participant.

## 2023-03-29 NOTE — PLAN OF CARE
"  Problem: Anxiety  Goal: Anxiety Reduction or Resolution  Outcome: Progressing     Pt presented as alert and oriented to place and self throughout shift.  She was visible in the milieu during the day, social with peers and attending OT groups.  Pt was dressed appropriately and appeared adequately hygenic.  She ate meals and was compliant with her medications.  Pt denied pain.  She endorsed anxiety, describing it as \"dumpy.\" and requested PRN gabapentin.  Pt denied depression.  She also requested PRN Zofran for nausea in the late morning, her inhaler before OT groups, and nicotine gum at breakfast time.  Pt did not endorse any symptoms of psychosis.  She also denied and acute physical health concerns, pain, or side effects to medications this shift.  "

## 2023-03-29 NOTE — PLAN OF CARE
OCCUPATIONAL THERAPY GROUP NOTE:     03/29/23 1240   Group Therapy Session   Group Attendance attended group session   Time Session Began 1015   Time Session Ended 1145   Total Time (minutes) 90   Total # Attendees 5   Group Type task skill   Group Topic Covered structured socialization;leisure exploration/use of leisure time;problem-solving;coping skills/lifestyle management   Group Session Detail OT Clinic Group x2   Patient Response/Contribution cooperative with task;listened actively;organized;pleasant;engaged;congruent affect   Patient Participation Detail Occupational therapy clinic. Purpose of structured group: exploration/development of positive coping skills, engagement in creative expression and clinical observation of social, cognitive, and kinesthetic performance skills. Pt response: Pt actively participated in occupational therapy clinic. Chosen activity: completion of a detailed stenciling/painting canvas bag project. Independent to initiate, gather materials, sequence and adjust to workspace demands as needed. Demonstrated excellent focus (60 min without interruption), planning, and attention to detail for this moderately complex task. Able to ask for assistance and socialized with peers and staff. Future-oriented in conversation, and discussed her plans to make a bandana for her next project in clinic group. Affect appeared to brighten in interactions, and she was encouraging of peers as they worked on their projects.

## 2023-03-30 PROCEDURE — G0177 OPPS/PHP; TRAIN & EDUC SERV: HCPCS

## 2023-03-30 PROCEDURE — 250N000013 HC RX MED GY IP 250 OP 250 PS 637: Performed by: PSYCHIATRY & NEUROLOGY

## 2023-03-30 PROCEDURE — 124N000002 HC R&B MH UMMC

## 2023-03-30 PROCEDURE — 99232 SBSQ HOSP IP/OBS MODERATE 35: CPT | Performed by: PSYCHIATRY & NEUROLOGY

## 2023-03-30 RX ORDER — CLONIDINE HYDROCHLORIDE 0.1 MG/1
0.1 TABLET ORAL 2 TIMES DAILY PRN
Status: DISCONTINUED | OUTPATIENT
Start: 2023-03-30 | End: 2023-04-11 | Stop reason: HOSPADM

## 2023-03-30 RX ADMIN — BUSPIRONE HYDROCHLORIDE 7.5 MG: 7.5 TABLET ORAL at 21:38

## 2023-03-30 RX ADMIN — NICOTINE POLACRILEX 4 MG: 4 GUM, CHEWING ORAL at 16:05

## 2023-03-30 RX ADMIN — FLUTICASONE PROPIONATE 1 SPRAY: 50 SPRAY, METERED NASAL at 21:39

## 2023-03-30 RX ADMIN — CETIRIZINE HYDROCHLORIDE 10 MG: 10 TABLET, FILM COATED ORAL at 08:57

## 2023-03-30 RX ADMIN — ALBUTEROL SULFATE 2 PUFF: 90 AEROSOL, METERED RESPIRATORY (INHALATION) at 21:40

## 2023-03-30 RX ADMIN — Medication 6000 UNITS: at 17:40

## 2023-03-30 RX ADMIN — Medication 6000 UNITS: at 12:16

## 2023-03-30 RX ADMIN — TRAZODONE HYDROCHLORIDE 100 MG: 50 TABLET ORAL at 21:40

## 2023-03-30 RX ADMIN — Medication 1 LOZENGE: at 16:05

## 2023-03-30 RX ADMIN — OMEPRAZOLE 20 MG: 20 CAPSULE, DELAYED RELEASE ORAL at 08:57

## 2023-03-30 RX ADMIN — NICOTINE POLACRILEX 4 MG: 4 GUM, CHEWING ORAL at 21:52

## 2023-03-30 RX ADMIN — BENZOYL PEROXIDE: 50 GEL TOPICAL at 21:39

## 2023-03-30 RX ADMIN — NICOTINE 1 PATCH: 21 PATCH, EXTENDED RELEASE TRANSDERMAL at 08:57

## 2023-03-30 RX ADMIN — IBUPROFEN 600 MG: 600 TABLET ORAL at 18:14

## 2023-03-30 RX ADMIN — NICOTINE POLACRILEX 4 MG: 4 GUM, CHEWING ORAL at 19:39

## 2023-03-30 RX ADMIN — WHITE PETROLATUM: 1.75 OINTMENT TOPICAL at 21:38

## 2023-03-30 RX ADMIN — Medication 1 LOZENGE: at 21:40

## 2023-03-30 RX ADMIN — FLUTICASONE PROPIONATE 1 SPRAY: 50 SPRAY, METERED NASAL at 09:18

## 2023-03-30 RX ADMIN — Medication 6000 UNITS: at 09:18

## 2023-03-30 RX ADMIN — PRENATAL VITAMINS-IRON FUMARATE 27 MG IRON-FOLIC ACID 0.8 MG TABLET 1 TABLET: at 08:56

## 2023-03-30 RX ADMIN — BUSPIRONE HYDROCHLORIDE 7.5 MG: 7.5 TABLET ORAL at 15:05

## 2023-03-30 RX ADMIN — BUSPIRONE HYDROCHLORIDE 7.5 MG: 7.5 TABLET ORAL at 08:56

## 2023-03-30 RX ADMIN — QUETIAPINE FUMARATE 600 MG: 300 TABLET ORAL at 21:39

## 2023-03-30 ASSESSMENT — ACTIVITIES OF DAILY LIVING (ADL)
ADLS_ACUITY_SCORE: 29
ORAL_HYGIENE: INDEPENDENT
DRESS: STREET CLOTHES;INDEPENDENT
ADLS_ACUITY_SCORE: 29
LAUNDRY: WITH SUPERVISION
HYGIENE/GROOMING: INDEPENDENT
ORAL_HYGIENE: INDEPENDENT
ADLS_ACUITY_SCORE: 29
DRESS: INDEPENDENT
ADLS_ACUITY_SCORE: 29
HYGIENE/GROOMING: INDEPENDENT

## 2023-03-30 NOTE — PLAN OF CARE
03/30/23 1520   Individualization/Patient Specific Goals   Patient Personal Strengths expressive of needs;medication/treatment adherence;resilient;community support   Patient Vulnerabilities family/relationship conflict;history of unsuccessful treatment;housing insecurity;lacks insight into illness;occupational insecurity;limited social skills;poor impulse control;substance abuse/addiction   Interprofessional Rounds   Participants nursing;CTC;psychiatrist   Behavioral Team Discussion   Participants Rosalind Chi RN, Nurys Hsu [facility]   Progress moderate   Anticipated length of stay 4 weeks   Continued Stay Criteria/Rationale Walter requires stabilization of psychiatric symptoms. [facility]   Plan Patient will receive psychiatric and nursing cares on unit, will be offered OT and psychotherapy groups. CTC will assist with treatment/disposition planning, as well as plans for outpatient services for following discharge. Referrals have been made to [facility] and for a [place] Choice assessment, she is also on [facility] for [facility].   Rationale for change in precautions or plan no changes at this time.   Anticipated Discharge Disposition IRTS;psychiatric hospital;group home     PRECAUTIONS AND SAFETY    Behavioral Orders   Procedures    Cheeking Precautions (behavioral units)    Code 1 - Restrict to Unit    Elopement precautions    Routine Programming     As clinically indicated    Status 15     Every 15 minutes.       Safety  Safety WDL: WDL  Patient Location: dining room, group room, patient room, own  Observed Behavior: calm, sitting  Observed Behavior (Comment): Eating Meal, Attending Groups  Safety Measures: suicide check-in completed, suicide assessment completed, safety rounds completed, clinical history reviewed, environmental rounds completed  Diversional Activity: television, music  Suicidality: Status 15, Minimal furniture in room, Minimal  personal belongings in room, Identify and strengthen protective factors, Promote patient engagement with treatment process, Optimize communication / relationship to minimize opportunity for self-harm  Assault: status 15, minimal furniture in room, minimal personal belongings in room  Elopement Assessment: Statements about wanting to leave  Elopement Interventions: status 15, behavioral scrubs (george)  Sexual: status 15

## 2023-03-30 NOTE — PLAN OF CARE
"Assessment/Intervention/Current Symtoms and Care Coordination:  -Chart review  -Team meeting: nurse reports Asha was still sleeping this morning - states she likes to \"sleep in\", Asha was social with peers in visible in milieu yesterday had anxiety at the end of the day was tearful and had many questions about her diagnosis, was preservative about her seroquel and if it was helpful or not but was compliant with this medications continues to refuse diabetes medications despite concerns about some blood levels.    WR emailed with  [facility]ford and Tyesha. Made plan for a care conference via teams next Wednesday at 10 am to discuss plan for IRTS or group homes. WR sent referral to [facility] for a [place] assessment. CAMILO Christina stated that [facility], this could be an option once she is stabilized on medications. If another suitable option ([facility]) is available before that then she will be removed from [facility] list.    Current Symptoms include the following: Psychosis, disorganization, patient is irritable  Precautions: Elopement and Cheeking     Discharge Plan or Goal:  Pending stabilization & development of a safe discharge plan.  Considerations include: IRTS with plan to pursue group home placement     Barriers to Discharge:  Asha presents with disorganized thought process and paranoia. She requires symptom stabilization, medication management, and supportive discharge plan.      Referral Status:  [facility]TS referrals (initiated by  Sanford)     [facility]: [address] [place] [address] (WR left msg looking for update on 3/27)  [place] [address]  [facility]  Phone:  [phone]  Fax:  [phone]     [facility]: (Declined due to program feels Walter requires higher level of care)  [address] [place] [address]  Phone: [phone]     Legal Status:  Patient is under an MICD Commitment in [facility]   Case No. [id number]   "   Contacts:    [facility]  - Sanford Marinelli (phone: [phone], email: [email or web address])    Tyesha Early - commitment  with [facility] ([phone])     Upcoming Meetings/Important Dates:  Wednesday April 5 at 10 am Care conference with community CAMILO Christina and JOAN Lyons Hearing Monday April 10th at 9:30 am     Rationale for SIO/No Roommate Order:  Patient is not on SIO.  Patient has current roommate.

## 2023-03-30 NOTE — PLAN OF CARE
Pt appeared asleep for 5.25 hours. No medical and behavioral concerns noted. Remains 15 minutes safety check. Will continue POC.

## 2023-03-30 NOTE — PLAN OF CARE
Problem: Adult Behavioral Health Plan of Care  Goal: Develops/Participates in Therapeutic [place] to Support Successful Transition  Outcome: Progressing       Patient had been present in the lounge, watched movies and socialized with a select peer. Patient presents with flat affect, which brightens up when interacting with staff and peers.  Patient's mood can be labile. Patient denied all mental health symptoms including depression, anxiety, SI/SIB, A/V hallucination. Patient later reported back/buttock pain rated 9/10. Patient received prn Ibuprofen and was effective per report. BP this shift was 136/90. Appetite and intake have been adequate. Patient reported no  medication side effects and was compliant. Will continue to encourage group participation, monitor response to medication and treatment plans.

## 2023-03-30 NOTE — PLAN OF CARE
Problem: Adult Behavioral Health Plan of Care  Goal: Develops/Participates in Therapeutic [place] to Support Successful Transition  Outcome: Progressing    Plan of Care Reviewed With: patient        Patient was out in the milieu most of the shift, attended psychotherapeutic group session and sociable with a select peers. Patients presents with a bright affect and was heard laughing out loudly in the lounge whilst interacting with peer and watching movies. On assessment, patient denied pain, anxiety, depression, SI/SIB, denied A/V hallucination. No evidence of patient responding to internal stimuli. Patient's BP and pulse have been trending upwards. No scheduled or prn anti hypertensive in the order. Sticky note was left for provider. Patient reported no concerns at this time and took her medications as prescribed. No observed or reported medication side effects. Staff will continue plan of care.

## 2023-03-30 NOTE — PLAN OF CARE
03/30/23 1526   Group Therapy Session   Group Attendance attended group session   Time Session Began 1415   Time Session Ended 1500   Total Time (minutes) 45   Total # Attendees 4   Group Type psychotherapeutic   Group Topic Covered coping skills/lifestyle management;problem-solving   Group Session Detail Healthy vs. Unhealthy Coping Strategies   Patient Response/Contribution offered helpful suggestions to peers;listened actively;discussed personal experience with topic;verbalizations were off topic   Patient Participation Detail Asha was socially appropriate participant and shared about some of her healthy/unhealthy coping strategies. She discussed that she tries to maintain positive relationships with others and talked about having 2 families. She said her problem is being unable to travel outside of the [place]. Asha remained mostly on topic, though made some statements that were difficult to follow.

## 2023-03-30 NOTE — PLAN OF CARE
Problem: Anxiety  Goal: Anxiety Reduction or Resolution  Outcome: Progressing     Pt presented as alert and oriented to place and self throughout shift.  She slept a majority of the morning and then was visible in the milieu during the afternoon participating in OT groups.  Pt was dressed appropriately and appeared adequately hygenic.  She ate meals and was compliant with her medications.  Pt did not require or request any PRNs this shift.  She endorsed anxiety, but denied depression.  Pt did not endorse any symptoms of psychosis.  She also denied and acute physical health concerns, pain, or side effects to medications this shift.

## 2023-03-30 NOTE — PROGRESS NOTES
"[facility], [place]   Psychiatric Progress Note  Hospital Day: 9        Interim History:   The patient's care was discussed with the treatment team during the daily team meeting and/or staff's chart notes were reviewed.  Staff report patient has been visible in the milieu, bright and laughing at times, denying MH symptoms outside of some anxiety, making some bizarre statements otherwise appropriate in interactions, taking medications as prescribed, having some elevated BP and pulse, slept 5.25 hours.     Upon interview, pt was sleeping, difficult to arouse, writer entered room to wake patient up x2, able to wake up second time, plans to go have breakfast, of note does continue to have dairy products on unit without any issue and reports regular consumption of milk outside of hospital. She states mood is \"okay\", denies any SI or HI, denies AVH. Reports tolerating medications including starting gel for acne on face in evenings. Did not have any questions/concerns including re: Lyons proceedings. She agrees to let staff know if she has more concerns later in day.          Medications:       benzoyl peroxide   Topical At Bedtime     busPIRone  7.5 mg Oral TID     cetirizine  10 mg Oral Daily     fluticasone  1 spray Both Nostrils BID     lactase  6,000 Units Oral TID w/meals     mineral oil-hydrophilic petrolatum   Topical At Bedtime     nicotine  1 patch Transdermal Daily     nicotine   Transdermal Q8H     omeprazole  20 mg Oral Daily     prenatal multivitamin w/iron  1 tablet Oral Daily     QUEtiapine  600 mg Oral At Bedtime          Allergies:     Allergies   Allergen Reactions     Haloperidol Other (See Comments)     Shakes      Lac Bovis Anaphylaxis     Throat swells up      Morphine Hives     Dust Mite Extract      Watery eyes and runny nose     Pollen Extract      Watery eyes and stuffy nose     Zyprexa [Olanzapine]      Skin rash, wants to talk to her MD about it          Labs: "     No results found for this or any previous visit (from the past 48 hour(s)).       Psychiatric Examination:     /85   Pulse 106   Temp 97.8  F (36.6  C) (Tympanic)   Resp 18   Ht 1.524 m (5')   Wt 87.5 kg (192 lb 12.8 oz)   SpO2 98%   BMI 37.65 kg/m    Weight is 192 lbs 12.8 oz  Body mass index is 37.65 kg/m .    Orthostatic Vitals       Most Recent      Sitting Orthostatic /89 03/29 0902    Sitting Orthostatic Pulse (bpm) 103 03/29 0902    Standing Orthostatic /85 03/29 0902    Standing Orthostatic Pulse (bpm) 107 03/29 0902        Appearance: awake, alert and adequately groomed  Attitude:  somewhat cooperative  Eye Contact:  poor , often closing eyes   Mood:  labile, either very bright or very irritable  Affect:  mood congruent, continues to be labile in interactions, blunted today likely due to being tired   Speech:  appropriate volume, normal prosody  Language: fluent and intact in English  Psychomotor, Gait, Musculoskeletal:  no evidence of tardive dyskinesia, dystonia, or tics  Thought Process:  tangential, illogical around treatment/medications  Associations:  no loose associations  Thought Content:  no evidence of suicidal ideation or homicidal ideation and denies AVH, appears paranoid/guarded  Insight:  limited  Judgement:  limited  Oriented to:  time, person, and place  Attention Span and Concentration:  limited  Recent and Remote Memory:  limited  Fund of Knowledge:  appropriate    Clinical Global Impressions  First:  Considering your total clinical experience with this particular patient population, how severe are the patient's symptoms at this time?: 7 (03/21/23 1752)  Compared to the patient's condition at the START of treatment, this patient's condition is: 4 (03/21/23 1752)  Most recent:  Considering your total clinical experience with this particular patient population, how severe are the patient's symptoms at this time?: 6 (03/30/23 1247)  Compared to the patient's  "condition at the START of treatment, this patient's condition is: 3 (23 1247)         Precautions:     Behavioral Orders   Procedures     Cheeking Precautions (behavioral units)     Code 1 - Restrict to Unit     Elopement precautions     Routine Programming     As clinically indicated     Status 15     Every 15 minutes.          Diagnoses:      Schizoaffective disorder, depressed type vs bipolar type, with acute decompensation in psychosis symptoms and some mood instability (provisional)  Unspecified anxiety   Polysubstance use disorder including opioid and methamphetamine   Nicotine use disorder  R/O substance induced psychosis   Seasonal allergies and rhinitis   Asthma   Obesity   Type 2 DM poorly controlled          Assessment & Plan:   Assessment and hospital summary:  This patient is a 27 year old female under commitment in [facility] with history of psychosis and polysubstance use who presented to ED from [facility] due to concerns for worsening psychosis despite medication changes and inability to engage in program. She was discharged from , ED staff informed patients PD was revoked and she is now being admitted under committed status with plan to discharge to [facility] once stable per her [facility] CM. She was not cooperative with admission interview, majority of history obtained from chart review, will continue current medications as noted in chart, ordered admission labs, plan to coordinate with patients CM regarding disposition as patient stabilizes. Patient has continued to show limited insight into psychiatric symptoms and need for treatment, is illogical in requests around medications and willingness to take medications, fixated on the color of medications or concerned they are \"the right one\" which appears based in paranoia/delusions. Appears Lyons had , writer submitted request for a new Lyons 3/28/23.      Inpatient psychiatric hospitalization is warranted at this time for safety, " stabilization, and possible adjustment in medications.    Psychiatric treatment/inteventions:  Medications:   -continue PTA quetiapine (now in IR formulation) at 600mg at bedtime with additional IR 100mg BID PRN for agitation and psychosis (changed from XR due to patient declining to take XR medications due to pills being yellow in color)  -continue PTA buspirone 7.5mg TID for anxiety, plan to increase as indicated/toerlated     -continue PRN benzotropine 1mg BID for possible EPSE   -PRN hydroxyzine 25mg every 4 hour for anxiety  -continue PRN trazodone 100mg at bedtime for sleep   -PRN thorazine 10mg PO or 25mg IM with benadryl 50mg PO or IM TID for agitation/psychosis (pt has olanzapine listed as allergy)  -start clonidine 0.1mg BID PRN for anxiety given some recent elevated HR and BPs, hold parameters in place     -also ordered PRN aquaphor for dry skin as PRN vanicream not available. Continue Lactaid for lactose intolerance, nicotine patch and prenatal vitamin per pts request    The risks, benefits, alternatives and side effects have been discussed and are understood by the patient.     Laboratory/Imaging: no new labs ordered      Patient will be treated in therapeutic milieu with appropriate individual and group therapies as described.     Medical treatment/interventions:  Medical concerns: Pt denying acute medical concerns, will continue PTA flonase and zyrtec for allergies, PRN albuterol inhaler for asthma and nicotine gum for nicotine withdrawal. Elevated HgbA1c and abnormal lipid panel, IM consult placed 3/23, per note:   Asha Arrieta is a 27 year old female admitted on 3/15/2023. She has a past medical history of T2DM, schizoaffective disorder (depressed versus bipolar type), polysubstance use disorder (opioid, methamphetamine, tobacco), seasonal allergies, asthma who was admitted after presenting to the ED from [facility] for worsening psychosis.  Medicine consulted for diabetes management.     Type  "2 Diabetes Mellitus, non-insulin-dependent, poorly controlled  A1c this morning 8.1%. Prior to this was 7.4% on 10/17/2022 in Care Everywhere. According to outside medication record, patient had been on Metformin 500 mg daily in the past but stated that she had side effects and would not elaborate further.  Limited data available for review, but glucose trends appear to be elevated above goal (190s-250s).  Patient unwilling to discuss diabetes with writer or \"anyone from this hospital\".  She vehemently denied having diabetes despite writer discussing that her A1c currently categorizes her as having diabetes, and became uncooperative.   - Discussed patient case with Dr. Donal Abrams. Should the patient be open to discussing diabetes with Internal Medicine, we are happy to come back and discuss management with the patient.  - Follow-up with PCP otherwise     Hyperlipidemia, mixed  Lipid panel drawn per psychiatry on admission showing low HDL, high LDL, high triglycerides.  - Follow-up with PCP within 1-2 weeks after discharge     Medicine will sign off. No further recommendations at this time.  Please feel free to reconsult if any new medical issues or concerns.         The patient's care was discussed with the Bedside Nurse and Patient, Primary Team (Dr. Katie Abrams).        Clinically Significant Risk Factors []Expand by Default                         # DMII: A1C = 8.1 % (Ref range: <5.7 %) within past 6 months, PRESENT ON ADMISSION  # Obesity: Estimated body mass index is 37.09 kg/m  as calculated from the following:    Height as of this encounter: 1.524 m (5').    Weight as of this encounter: 86.1 kg (189 lb 14.4 oz)., PRESENT ON ADMISSION             Toby Rendon PA-C  Hospitalist Service      Disposition Plan   Reason for ongoing admission: is unable to care for self due to severe psychosis or luli  Discharge location: [facility] facility  Discharge Medications: not ordered  Follow-up Appointments: not " scheduled  Legal Status: full commitment, appears Lyons , CTC looking into process for filing new Lyons with current commitment, new Lyons request sent to [facility] 3/28, requested thorazine, quetiapine, aripiprazole, risperidone     This document is created with the help of Dragon dictation system.  All grammatical/typing errors or context distortion are unintentional and inherent to software.    Patient has been seen and evaluated by me, Katie Abrams DO.

## 2023-03-31 PROCEDURE — 124N000002 HC R&B MH UMMC

## 2023-03-31 PROCEDURE — 250N000013 HC RX MED GY IP 250 OP 250 PS 637: Performed by: PSYCHIATRY & NEUROLOGY

## 2023-03-31 PROCEDURE — 99232 SBSQ HOSP IP/OBS MODERATE 35: CPT | Performed by: PSYCHIATRY & NEUROLOGY

## 2023-03-31 PROCEDURE — G0177 OPPS/PHP; TRAIN & EDUC SERV: HCPCS

## 2023-03-31 PROCEDURE — H2032 ACTIVITY THERAPY, PER 15 MIN: HCPCS

## 2023-03-31 RX ORDER — POLYETHYLENE GLYCOL 3350 17 G
2-4 POWDER IN PACKET (EA) ORAL
Status: DISCONTINUED | OUTPATIENT
Start: 2023-03-31 | End: 2023-04-11 | Stop reason: HOSPADM

## 2023-03-31 RX ADMIN — BUSPIRONE HYDROCHLORIDE 7.5 MG: 7.5 TABLET ORAL at 13:10

## 2023-03-31 RX ADMIN — TRAZODONE HYDROCHLORIDE 100 MG: 50 TABLET ORAL at 20:51

## 2023-03-31 RX ADMIN — PRENATAL VITAMINS-IRON FUMARATE 27 MG IRON-FOLIC ACID 0.8 MG TABLET 1 TABLET: at 10:01

## 2023-03-31 RX ADMIN — FLUTICASONE PROPIONATE 1 SPRAY: 50 SPRAY, METERED NASAL at 20:52

## 2023-03-31 RX ADMIN — NICOTINE POLACRILEX 4 MG: 4 GUM, CHEWING BUCCAL at 17:06

## 2023-03-31 RX ADMIN — QUETIAPINE FUMARATE 600 MG: 300 TABLET ORAL at 20:50

## 2023-03-31 RX ADMIN — FLUTICASONE PROPIONATE 1 SPRAY: 50 SPRAY, METERED NASAL at 10:05

## 2023-03-31 RX ADMIN — OMEPRAZOLE 20 MG: 20 CAPSULE, DELAYED RELEASE ORAL at 10:01

## 2023-03-31 RX ADMIN — Medication 6000 UNITS: at 11:54

## 2023-03-31 RX ADMIN — BUSPIRONE HYDROCHLORIDE 7.5 MG: 7.5 TABLET ORAL at 10:01

## 2023-03-31 RX ADMIN — Medication 1 LOZENGE: at 10:03

## 2023-03-31 RX ADMIN — NICOTINE POLACRILEX 2 MG: 4 GUM, CHEWING BUCCAL at 20:58

## 2023-03-31 RX ADMIN — WHITE PETROLATUM: 1.75 OINTMENT TOPICAL at 20:53

## 2023-03-31 RX ADMIN — Medication 6000 UNITS: at 18:02

## 2023-03-31 RX ADMIN — CETIRIZINE HYDROCHLORIDE 10 MG: 10 TABLET, FILM COATED ORAL at 10:01

## 2023-03-31 RX ADMIN — NICOTINE POLACRILEX 4 MG: 4 GUM, CHEWING ORAL at 10:01

## 2023-03-31 RX ADMIN — ALBUTEROL SULFATE 1 PUFF: 90 AEROSOL, METERED RESPIRATORY (INHALATION) at 20:57

## 2023-03-31 RX ADMIN — Medication 6000 UNITS: at 10:01

## 2023-03-31 RX ADMIN — GABAPENTIN 300 MG: 300 CAPSULE ORAL at 00:12

## 2023-03-31 RX ADMIN — BUSPIRONE HYDROCHLORIDE 7.5 MG: 7.5 TABLET ORAL at 20:51

## 2023-03-31 RX ADMIN — BENZOYL PEROXIDE: 50 GEL TOPICAL at 20:52

## 2023-03-31 RX ADMIN — GABAPENTIN 300 MG: 300 CAPSULE ORAL at 10:01

## 2023-03-31 RX ADMIN — NICOTINE POLACRILEX 2 MG: 2 LOZENGE ORAL at 13:10

## 2023-03-31 RX ADMIN — MELATONIN TAB 3 MG 3 MG: 3 TAB at 00:10

## 2023-03-31 ASSESSMENT — ACTIVITIES OF DAILY LIVING (ADL)
ADLS_ACUITY_SCORE: 29
HYGIENE/GROOMING: INDEPENDENT
ADLS_ACUITY_SCORE: 29
HYGIENE/GROOMING: INDEPENDENT
ORAL_HYGIENE: INDEPENDENT
ADLS_ACUITY_SCORE: 29
ORAL_HYGIENE: INDEPENDENT
ADLS_ACUITY_SCORE: 29
LAUNDRY: WITH SUPERVISION
DRESS: INDEPENDENT
LAUNDRY: WITH SUPERVISION
ADLS_ACUITY_SCORE: 29
DRESS: INDEPENDENT
ADLS_ACUITY_SCORE: 29

## 2023-03-31 NOTE — PROGRESS NOTES
"[facility], [place]   Psychiatric Progress Note  Hospital Day: 10        Interim History:   The patient's care was discussed with the treatment team during the daily team meeting and/or staff's chart notes were reviewed.  Staff report patient was visible in the milieu, continues to have labile mood, taking medications as prescribed, attending groups, denying safety concerns, slept 5.75 hours.     Upon interview, patient awoke with writer entering her room, reports that she is \"fine\" and that her mood is \"okay\" she asked about being able to stop taking Seroquel she does not feel like it is effective for her, reviewed that this is a medication that she has been on before admission and plan to be to continue it while awaiting outcome of court proceedings for her medication order.  She expressed understanding.  She states that she feels like she could be maintained just on gabapentin, and writer attempted to discuss concerns with this but patient was minimally receptive.  She also requested to have some \"actual nicotine\" and states that there is not any nicotine in the patches or gum that she is receiving, she is agreeable to try lozenges and have the option of lozenges or gum over the weekend.  She denies having any thoughts of harming self or others, denies having any psychosis symptoms.  She reports that she is not open to consideration of any medications for her blood sugar or blood pressure at this time.  She agrees to let staff know if this changes.  No additional concerns.         Medications:       benzoyl peroxide   Topical At Bedtime     busPIRone  7.5 mg Oral TID     cetirizine  10 mg Oral Daily     fluticasone  1 spray Both Nostrils BID     lactase  6,000 Units Oral TID w/meals     mineral oil-hydrophilic petrolatum   Topical At Bedtime     nicotine  1 patch Transdermal Daily     nicotine   Transdermal Q8H     omeprazole  20 mg Oral Daily     prenatal multivitamin w/iron  1 " tablet Oral Daily     QUEtiapine  600 mg Oral At Bedtime          Allergies:     Allergies   Allergen Reactions     Haloperidol Other (See Comments)     Shakes      Morphine Hives     Dust Mite Extract      Watery eyes and runny nose     Pollen Extract      Watery eyes and stuffy nose     Zyprexa [Olanzapine]      Skin rash, wants to talk to her MD about it          Labs:     No results found for this or any previous visit (from the past 48 hour(s)).       Psychiatric Examination:     BP (!) 136/90 (BP Location: Left arm, Patient Position: Sitting)   Pulse 100   Temp 97.7  F (36.5  C) (Oral)   Resp 18   Ht 1.524 m (5')   Wt 87.5 kg (192 lb 12.8 oz)   SpO2 97%   BMI 37.65 kg/m    Weight is 192 lbs 12.8 oz  Body mass index is 37.65 kg/m .    Orthostatic Vitals       Most Recent      Sitting Orthostatic /86 03/31 0856    Sitting Orthostatic Pulse (bpm) 98 03/31 0856    Standing Orthostatic /85 03/31 0856    Standing Orthostatic Pulse (bpm) 108 03/31 0856        Appearance: awake, alert and adequately groomed  Attitude:  somewhat cooperative  Eye Contact:  fair  Mood:  labile, either very bright or very irritable  Affect:  mood congruent, continues to be labile in interactions  Speech:  appropriate volume, normal prosody  Language: fluent and intact in English  Psychomotor, Gait, Musculoskeletal:  no evidence of tardive dyskinesia, dystonia, or tics  Thought Process:  tangential, illogical around treatment/medications  Associations:  no loose associations  Thought Content:  no evidence of suicidal ideation or homicidal ideation and denies AVH, appears paranoid/guarded  Insight:  limited  Judgement:  limited  Oriented to:  time, person, and place  Attention Span and Concentration:  limited  Recent and Remote Memory:  limited  Fund of Knowledge:  appropriate    Clinical Global Impressions  First:  Considering your total clinical experience with this particular patient population, how severe are the  patient's symptoms at this time?: 7 (03/21/23 1752)  Compared to the patient's condition at the START of treatment, this patient's condition is: 4 (03/21/23 1752)  Most recent:  Considering your total clinical experience with this particular patient population, how severe are the patient's symptoms at this time?: 6 (03/30/23 1247)  Compared to the patient's condition at the START of treatment, this patient's condition is: 3 (03/30/23 1247)         Precautions:     Behavioral Orders   Procedures     Cheeking Precautions (behavioral units)     Code 1 - Restrict to Unit     Elopement precautions     Routine Programming     As clinically indicated     Status 15     Every 15 minutes.          Diagnoses:      Schizoaffective disorder, depressed type vs bipolar type, with acute decompensation in psychosis symptoms and some mood instability (provisional)  Unspecified anxiety   Polysubstance use disorder including opioid and methamphetamine   Nicotine use disorder  R/O substance induced psychosis   Seasonal allergies and rhinitis   Asthma   Obesity   Type 2 DM poorly controlled          Assessment & Plan:   Assessment and hospital summary:  This patient is a 27 year old female under commitment in [facility] with history of psychosis and polysubstance use who presented to ED from [facility] due to concerns for worsening psychosis despite medication changes and inability to engage in program. She was discharged from , ED staff informed patients PD was revoked and she is now being admitted under committed status with plan to discharge to [facility] once stable per her [facility] CM. She was not cooperative with admission interview, majority of history obtained from chart review, will continue current medications as noted in chart, ordered admission labs, plan to coordinate with patients CM regarding disposition as patient stabilizes. Patient has continued to show limited insight into psychiatric symptoms and need for treatment,  "is illogical in requests around medications and willingness to take medications, fixated on the color of medications or concerned they are \"the right one\" which appears based in paranoia/delusions. Appears Lyons had , writer submitted request for a new Lyons 3/28/23.      Inpatient psychiatric hospitalization is warranted at this time for safety, stabilization, and possible adjustment in medications.    Psychiatric treatment/inteventions:  Medications:   -continue PTA quetiapine (now in IR formulation) at 600mg at bedtime with additional IR 100mg BID PRN for agitation and psychosis (changed from XR due to patient declining to take XR medications due to pills being yellow in color)  -continue PTA buspirone 7.5mg TID for anxiety, plan to increase as indicated/toerlated     -continue PRN benzotropine 1mg BID for possible EPSE   -PRN hydroxyzine 25mg every 4 hour for anxiety  -continue PRN trazodone 100mg at bedtime for sleep   -PRN thorazine 10mg PO or 25mg IM with benadryl 50mg PO or IM TID for agitation/psychosis (pt has olanzapine listed as allergy)  -clonidine 0.1mg BID PRN for anxiety given some recent elevated HR and BPs, hold parameters in place     -also ordered PRN aquaphor for dry skin as PRN vanicream not available. Continue Lactaid for lactose intolerance, nicotine patch and prenatal vitamin per pts request    The risks, benefits, alternatives and side effects have been discussed and are understood by the patient.     Laboratory/Imaging: no new labs ordered      Patient will be treated in therapeutic milieu with appropriate individual and group therapies as described.     Medical treatment/interventions:  Medical concerns: Pt denying acute medical concerns, will continue PTA flonase and zyrtec for allergies, PRN albuterol inhaler for asthma and nicotine gum for nicotine withdrawal. Elevated HgbA1c and abnormal lipid panel, IM consult placed 3/23, per note:   Asha Arrieta is a 27 year old female " "admitted on 3/15/2023. She has a past medical history of T2DM, schizoaffective disorder (depressed versus bipolar type), polysubstance use disorder (opioid, methamphetamine, tobacco), seasonal allergies, asthma who was admitted after presenting to the ED from [facility] for worsening psychosis.  Medicine consulted for diabetes management.     Type 2 Diabetes Mellitus, non-insulin-dependent, poorly controlled  A1c this morning 8.1%. Prior to this was 7.4% on 10/17/2022 in Care Everywhere. According to outside medication record, patient had been on Metformin 500 mg daily in the past but stated that she had side effects and would not elaborate further.  Limited data available for review, but glucose trends appear to be elevated above goal (190s-250s).  Patient unwilling to discuss diabetes with writer or \"anyone from this hospital\".  She vehemently denied having diabetes despite writer discussing that her A1c currently categorizes her as having diabetes, and became uncooperative.   - Discussed patient case with Dr. Donal Abrams. Should the patient be open to discussing diabetes with Internal Medicine, we are happy to come back and discuss management with the patient.  - Follow-up with PCP otherwise     Hyperlipidemia, mixed  Lipid panel drawn per psychiatry on admission showing low HDL, high LDL, high triglycerides.  - Follow-up with PCP within 1-2 weeks after discharge     Medicine will sign off. No further recommendations at this time.  Please feel free to reconsult if any new medical issues or concerns.         The patient's care was discussed with the Bedside Nurse and Patient, Primary Team (Dr. Katie Abrams).        Clinically Significant Risk Factors []Expand by Default                         # DMII: A1C = 8.1 % (Ref range: <5.7 %) within past 6 months, PRESENT ON ADMISSION  # Obesity: Estimated body mass index is 37.09 kg/m  as calculated from the following:    Height as of this encounter: 1.524 m " (5').    Weight as of this encounter: 86.1 kg (189 lb 14.4 oz)., PRESENT ON ADMISSION             Toby Rendon PA-C  Hospitalist Service      Disposition Plan   Reason for ongoing admission: is unable to care for self due to severe psychosis or luli  Discharge location: [facility] facility vs [facility] vs , see CTC notes   Discharge Medications: not ordered  Follow-up Appointments: not scheduled  Legal Status: full commitment, appears Lyons , CTC looking into process for filing new Lyons with current commitment, new Lyons request sent to [facility] 3/28, requested thorazine, quetiapine, aripiprazole, risperidone     This document is created with the help of Dragon dictation system.  All grammatical/typing errors or context distortion are unintentional and inherent to software.    Patient has been seen and evaluated by Katie adams DO.

## 2023-03-31 NOTE — PLAN OF CARE
Assessment/Intervention/Current Symtoms and Care Coordination:  -Chart review  -Team meeting - Asha continues to lack insight into diagnosis of diabetes and has not wanted to engage in diabetic care. Provider states Asha would like Seroquel to be discontinued and to just continue with Gabapentin, but was redirected as she has upcoming Lyons hearing. Asha has expressed disinterest in taking Seroquel XR as it is a different color than she's used to, so provider consulted with pharmacist and Asha is now receiving immediate release pills. JOAN Nuno has care conference scheduled for next week to further discuss disposition referral options with  as Asha was resistant to hospital team initiating additional IRTS referrals due to thinking/accepting she will remain in the hospital until her commitment has .   Current Symptoms include the following: Psychosis and disorganization  Precautions: Elopement and Cheeking     Discharge Plan or Goal:  Pending stabilization & development of a safe discharge plan.  Considerations include: IRTS versus [facility] with plan to pursue group home placement      Barriers to Discharge:  Asha presents with disorganized thought process and paranoia. She requires symptom stabilization, medication management, and supportive discharge plan.      Referral Status:  IRTS referrals (initiated by  [facility])    Shamika FRANCO left message on 3/27 requesting update    Jannet Ngo - jason with recommendation to pursue higher level of care    Referred to [facility] (initiated by  [facility])      Legal Status:  Patient is under an MICD Commitment in [facility]   Case No. [id number]    Team submitted petition for Lyons on 3/28/2023. Hearing is on 4/10/2023.  (Proposed Lyons Medications: Thorazine, Seroquel, Abilify, and Risperdal)      Contacts:    [facility]  - Sanford Marinelli (phone: [phone],  email: [email or web address])    [facility] Commitment  - Tyesha Early (phone: [phone])     Upcoming Meetings/Important Dates:    Wednesday, April 5 at 10am by phone (?) - Care Conference with [facility]    Monday, April 10 at 9:30am via Zoom - Lyons Hearing     Rationale for SIO/No Roommate Order:  Patient is not on SIO.  Patient has current roommate.

## 2023-03-31 NOTE — PLAN OF CARE
"Pt appeared irritable and tense upon her initial interactions with writer this morning. She also presented with paranoid thought content; when writer attempted to give pt her scheduled nicotine patch, pt became visibly agitated and repeated multiple times that \"there's not real nicotine in that!\" Pt was unwilling to accept information contrary to this. However, as the day went on, pt was more conversational and less tense with writer. She has mostly kept to herself and interacts minimally with others in the milieu. No behavioral or safety concerns today.    Problem: Adult Behavioral Health Plan of Care  Goal: Plan of Care Review  Recent Flowsheet Documentation  Taken 3/31/2023 1020 by Kya Morse, RN  Patient Agreement with Plan of Care: agrees     "

## 2023-03-31 NOTE — PLAN OF CARE
NOC Shift Report     Pt in bed at beginning of shift, breathing quiet and unlabored. Pt woke up requesting for sleep aide, was given Melatonin 3mg PRN and was effective. Pt slept 5.75 hours.      No pt complaints or concerns at this time.      Will continue to monitor.     Goal Outcome Evaluation:   Problem: Sleep Disturbance  Goal: Adequate Sleep/Rest  Outcome: Progressing

## 2023-04-01 PROCEDURE — 250N000013 HC RX MED GY IP 250 OP 250 PS 637: Performed by: PSYCHIATRY & NEUROLOGY

## 2023-04-01 PROCEDURE — 124N000002 HC R&B MH UMMC

## 2023-04-01 PROCEDURE — G0177 OPPS/PHP; TRAIN & EDUC SERV: HCPCS

## 2023-04-01 PROCEDURE — H2032 ACTIVITY THERAPY, PER 15 MIN: HCPCS

## 2023-04-01 RX ADMIN — PRENATAL VITAMINS-IRON FUMARATE 27 MG IRON-FOLIC ACID 0.8 MG TABLET 1 TABLET: at 09:17

## 2023-04-01 RX ADMIN — ALBUTEROL SULFATE 2 PUFF: 90 AEROSOL, METERED RESPIRATORY (INHALATION) at 20:44

## 2023-04-01 RX ADMIN — Medication 1 LOZENGE: at 21:32

## 2023-04-01 RX ADMIN — BUSPIRONE HYDROCHLORIDE 7.5 MG: 7.5 TABLET ORAL at 20:34

## 2023-04-01 RX ADMIN — Medication 6000 UNITS: at 12:38

## 2023-04-01 RX ADMIN — WHITE PETROLATUM: 1.75 OINTMENT TOPICAL at 20:36

## 2023-04-01 RX ADMIN — NICOTINE POLACRILEX 4 MG: 4 GUM, CHEWING BUCCAL at 17:02

## 2023-04-01 RX ADMIN — GABAPENTIN 300 MG: 300 CAPSULE ORAL at 20:40

## 2023-04-01 RX ADMIN — BENZOYL PEROXIDE: 50 GEL TOPICAL at 20:36

## 2023-04-01 RX ADMIN — GABAPENTIN 300 MG: 300 CAPSULE ORAL at 09:17

## 2023-04-01 RX ADMIN — CETIRIZINE HYDROCHLORIDE 10 MG: 10 TABLET, FILM COATED ORAL at 09:17

## 2023-04-01 RX ADMIN — NICOTINE POLACRILEX 4 MG: 4 GUM, CHEWING BUCCAL at 21:32

## 2023-04-01 RX ADMIN — BUSPIRONE HYDROCHLORIDE 7.5 MG: 7.5 TABLET ORAL at 14:02

## 2023-04-01 RX ADMIN — Medication 6000 UNITS: at 18:17

## 2023-04-01 RX ADMIN — NICOTINE POLACRILEX 4 MG: 4 GUM, CHEWING BUCCAL at 09:20

## 2023-04-01 RX ADMIN — FLUTICASONE PROPIONATE 1 SPRAY: 50 SPRAY, METERED NASAL at 09:18

## 2023-04-01 RX ADMIN — OMEPRAZOLE 20 MG: 20 CAPSULE, DELAYED RELEASE ORAL at 09:17

## 2023-04-01 RX ADMIN — ALBUTEROL SULFATE 2 PUFF: 90 AEROSOL, METERED RESPIRATORY (INHALATION) at 09:18

## 2023-04-01 RX ADMIN — BUSPIRONE HYDROCHLORIDE 7.5 MG: 7.5 TABLET ORAL at 09:18

## 2023-04-01 RX ADMIN — NICOTINE POLACRILEX 4 MG: 4 GUM, CHEWING BUCCAL at 11:57

## 2023-04-01 RX ADMIN — Medication 3000 UNITS: at 09:17

## 2023-04-01 RX ADMIN — QUETIAPINE FUMARATE 600 MG: 300 TABLET ORAL at 20:34

## 2023-04-01 RX ADMIN — FLUTICASONE PROPIONATE 1 SPRAY: 50 SPRAY, METERED NASAL at 20:36

## 2023-04-01 RX ADMIN — TRAZODONE HYDROCHLORIDE 50 MG: 50 TABLET ORAL at 20:40

## 2023-04-01 ASSESSMENT — ACTIVITIES OF DAILY LIVING (ADL)
ADLS_ACUITY_SCORE: 29
ADLS_ACUITY_SCORE: 28
ADLS_ACUITY_SCORE: 29
DRESS: INDEPENDENT
ADLS_ACUITY_SCORE: 29
HYGIENE/GROOMING: INDEPENDENT
ORAL_HYGIENE: INDEPENDENT
ADLS_ACUITY_SCORE: 29

## 2023-04-01 NOTE — PROGRESS NOTES
04/01/23 1800   Group Therapy Session   Group Attendance attended group session   Time Session Began 1600   Time Session Ended 1645   Total Time (minutes) 45   Total # Attendees 6   Group Type expressive therapy   Group Topic Covered emotions/expression   Patient Response/Contribution cooperative with task     Art Therapy directive was to create three images with the themes of compassion/self compassion using art media of pts choice.   Goals of directive: emotional expression, emotional regulation  Pt was an engaged participant, focused on task for the full duration of group. Pt finished drawings and briefly shared artwork with author and group. Pt filled her paper with lines, shapes and words using tempera paint sticks. Pt was very expressive with media. Pt began to describe her artwork but then left the group suddenly before author or group could ask questions about pts work. Pt did not return to group.   Pts mood was calm, affect did not change as pt abruptly left group.

## 2023-04-01 NOTE — PLAN OF CARE
Problem: Plan of Care - These are the overarching goals to be used throughout the patient stay.    Goal: Plan of Care Review  Description: The Plan of Care Review/Shift note should be completed every shift.  The Outcome Evaluation is a brief statement about your assessment that the patient is improving, declining, or no change.  This information will be displayed automatically on your shift note.  Outcome: Progressing   Goal Outcome Evaluation:    Plan of Care Reviewed With: patient          Patient out on unit, alert and oriented. Denied all psych symptoms (SI, HI, anxiety, depression, hallucinations, and delusions). Patient denied pain and contracted for safety. Patient appeared hostile, flat and blunted on approach. She engaged with RN during assessment mildly with suspicion. She requested Nicotine gum and it was administered.   Patient seen in hallway pacing with headphones in her ear.     Patient showered and had dinner. Continues to listen to headphones.  She had dinner and later watched a movie and engaged in a patient led bible study group help by another patient on the unit. She requested trazodone and gabapentin with bedtime medications and that was administered along with bedtime medication. Patient remains stable on unit with no other behavioral concerns.

## 2023-04-01 NOTE — PLAN OF CARE
Occupational Therapy Group Discussion     04/01/23 1348   Group Therapy Session   Group Attendance attended group session   Time Session Began 1015   Time Session Ended 1115   Total Time (minutes) 60   Total # Attendees 6   Group Type psychoeducation;life skill   Group Topic Covered emotions/expression;self-care activities;relationship;coping skills/lifestyle management;anger/conflict management   Group Session Detail General Health and Coping Skills: group discussion and education on feelings.   Patient Participation Detail Pt participated in a group discussion exploring feelings. Pt shared personal experiences with the group, reporting pt's life struggles when identifying their feelings (pt reports feeling hopeful and excited, despite hardships). Pt appeared upbeat and engaged in the discussion, as well as with working on the activity sheets.

## 2023-04-01 NOTE — PLAN OF CARE
Problem: Depressive Symptoms  Goal: Depressive Symptoms  Description: Signs and symptoms of listed problems will be absent or manageable.  Outcome: Progressing    Plan of Care Reviewed With: patient       Patient presented with irritable and tense affect. Mood was perceived as depressed. Patient was mostly isolative and socially withdrawn. Patient attended group, but did not spend time in the lounge watching television and socializing with peers like she usually does. Patient was very brief and guarded during assessment, declined to discuss what was bothering her. Patient denied pain and all mental health symptoms. Patient was observed pacing hallway, with an anxious look on her face. Patient's BP was 142/97. Writer offered patient prn  Clonidine for anxiety and also to help with her BP, but patient refused. Patient is eating adequately and snacking in between meals. No mention of medication side effect. No bowel or bladder concerns. Will continue plan of care.

## 2023-04-01 NOTE — PROGRESS NOTES
03/31/23 1900   Group Therapy Session   Group Attendance attended group session   Time Session Began 1600   Time Session Ended 1645   Total Time (minutes) 45   Total # Attendees 6   Group Type expressive therapy   Group Topic Covered emotions/expression   Patient Response/Contribution cooperative with task       Art Therapy directive was a group collaborative project, in which each participant moved around the room contributing to each piece of artwork. Pts were encouraged to add on to each others work.  Goals of directive: to assess how the individual functions within a group process, media exploration, emotional regulation.  Pt was a quiet, engaged participant, focused on task for the full duration of group. Pt contributed positively to each group drawing. Pt did not contribute to group process/discussion.  Pts mood was calm, pleasant participant.

## 2023-04-01 NOTE — PLAN OF CARE
"Pt has been in the milieu and not social with peers or staff. Pt was irritable and tense the first half of the shift and flat and cooperative around lunch time. Pt refused her nicotine patch stating it does not smell like nicotine. Attempted to explain the smell with no success. Pt was medication compliant. Requested inhaler with stated relief in SOB. Pt also requested gabapentin for anxiety with some relief. Pt wanted gabapentin only. Pt denies SI and SIB. Guarded. Labile. PT sat in the lounge looking at cards from a card game  most of the shift. Isolative and withdrawn. Pt states she sleeps and eats \"fine\" and appeared very irritable writer asked.     Goal Outcome Evaluation: ongoing                         "

## 2023-04-02 PROCEDURE — 124N000002 HC R&B MH UMMC

## 2023-04-02 PROCEDURE — 250N000013 HC RX MED GY IP 250 OP 250 PS 637: Performed by: PSYCHIATRY & NEUROLOGY

## 2023-04-02 RX ADMIN — NICOTINE POLACRILEX 4 MG: 4 GUM, CHEWING BUCCAL at 12:28

## 2023-04-02 RX ADMIN — PRENATAL VITAMINS-IRON FUMARATE 27 MG IRON-FOLIC ACID 0.8 MG TABLET 1 TABLET: at 09:51

## 2023-04-02 RX ADMIN — GABAPENTIN 300 MG: 300 CAPSULE ORAL at 14:12

## 2023-04-02 RX ADMIN — BUSPIRONE HYDROCHLORIDE 7.5 MG: 7.5 TABLET ORAL at 19:50

## 2023-04-02 RX ADMIN — Medication 6000 UNITS: at 19:49

## 2023-04-02 RX ADMIN — BUSPIRONE HYDROCHLORIDE 7.5 MG: 7.5 TABLET ORAL at 14:10

## 2023-04-02 RX ADMIN — ALBUTEROL SULFATE 2 PUFF: 90 AEROSOL, METERED RESPIRATORY (INHALATION) at 09:53

## 2023-04-02 RX ADMIN — BENZOYL PEROXIDE 1 APPLICATOR: 50 GEL TOPICAL at 20:41

## 2023-04-02 RX ADMIN — NICOTINE POLACRILEX 4 MG: 4 GUM, CHEWING BUCCAL at 19:50

## 2023-04-02 RX ADMIN — CETIRIZINE HYDROCHLORIDE 10 MG: 10 TABLET, FILM COATED ORAL at 09:51

## 2023-04-02 RX ADMIN — GABAPENTIN 300 MG: 300 CAPSULE ORAL at 19:49

## 2023-04-02 RX ADMIN — BUSPIRONE HYDROCHLORIDE 7.5 MG: 7.5 TABLET ORAL at 09:51

## 2023-04-02 RX ADMIN — OMEPRAZOLE 20 MG: 20 CAPSULE, DELAYED RELEASE ORAL at 09:51

## 2023-04-02 RX ADMIN — FLUTICASONE PROPIONATE 1 SPRAY: 50 SPRAY, METERED NASAL at 20:36

## 2023-04-02 RX ADMIN — TRAZODONE HYDROCHLORIDE 100 MG: 50 TABLET ORAL at 20:45

## 2023-04-02 RX ADMIN — NICOTINE POLACRILEX 4 MG: 4 GUM, CHEWING BUCCAL at 09:52

## 2023-04-02 RX ADMIN — Medication 1 LOZENGE: at 11:03

## 2023-04-02 RX ADMIN — FLUTICASONE PROPIONATE 1 SPRAY: 50 SPRAY, METERED NASAL at 09:53

## 2023-04-02 RX ADMIN — NICOTINE POLACRILEX 4 MG: 4 GUM, CHEWING BUCCAL at 14:46

## 2023-04-02 RX ADMIN — GABAPENTIN 300 MG: 300 CAPSULE ORAL at 09:52

## 2023-04-02 RX ADMIN — WHITE PETROLATUM: 1.75 OINTMENT TOPICAL at 20:41

## 2023-04-02 RX ADMIN — QUETIAPINE FUMARATE 600 MG: 300 TABLET ORAL at 20:45

## 2023-04-02 ASSESSMENT — ACTIVITIES OF DAILY LIVING (ADL)
DRESS: INDEPENDENT
ADLS_ACUITY_SCORE: 28
DRESS: INDEPENDENT
ADLS_ACUITY_SCORE: 28
ORAL_HYGIENE: INDEPENDENT
ADLS_ACUITY_SCORE: 28
ADLS_ACUITY_SCORE: 28
HYGIENE/GROOMING: INDEPENDENT
ADLS_ACUITY_SCORE: 28
ADLS_ACUITY_SCORE: 28
ORAL_HYGIENE: INDEPENDENT
ADLS_ACUITY_SCORE: 28
HYGIENE/GROOMING: INDEPENDENT

## 2023-04-02 NOTE — PLAN OF CARE
Pt has been in the lounge sitting by herself.  Pt is not social with staff or peers. Pt refused nicotine patch and lactase.  Pt requested inhaler and gabapentin both of which she said was helpful. Pt is guarded. Flat. Isolative and withdrawn. Pt denies SI and SIB. Tense.  Pt was less tense and guarded as the shift progressed. Pt ate breakfast and lunch with peers in the dining room. Pt is unsure where she will be discharging to. PRN throat lozenge given for sore throat with some relief.   Pt requested another PRN gabapentin for anxiety at 1410.     Goal Outcome Evaluation: ongoing

## 2023-04-03 PROCEDURE — 250N000013 HC RX MED GY IP 250 OP 250 PS 637: Performed by: PSYCHIATRY & NEUROLOGY

## 2023-04-03 PROCEDURE — H2032 ACTIVITY THERAPY, PER 15 MIN: HCPCS

## 2023-04-03 PROCEDURE — G0177 OPPS/PHP; TRAIN & EDUC SERV: HCPCS

## 2023-04-03 PROCEDURE — 124N000002 HC R&B MH UMMC

## 2023-04-03 RX ADMIN — Medication 6000 UNITS: at 17:46

## 2023-04-03 RX ADMIN — BENZOYL PEROXIDE 1 G: 50 GEL TOPICAL at 20:28

## 2023-04-03 RX ADMIN — CETIRIZINE HYDROCHLORIDE 10 MG: 10 TABLET, FILM COATED ORAL at 09:54

## 2023-04-03 RX ADMIN — PRENATAL VITAMINS-IRON FUMARATE 27 MG IRON-FOLIC ACID 0.8 MG TABLET 1 TABLET: at 09:54

## 2023-04-03 RX ADMIN — Medication 6000 UNITS: at 12:29

## 2023-04-03 RX ADMIN — OMEPRAZOLE 20 MG: 20 CAPSULE, DELAYED RELEASE ORAL at 09:54

## 2023-04-03 RX ADMIN — NICOTINE POLACRILEX 4 MG: 4 GUM, CHEWING BUCCAL at 09:54

## 2023-04-03 RX ADMIN — GABAPENTIN 300 MG: 300 CAPSULE ORAL at 09:54

## 2023-04-03 RX ADMIN — GABAPENTIN 300 MG: 300 CAPSULE ORAL at 20:33

## 2023-04-03 RX ADMIN — BUSPIRONE HYDROCHLORIDE 7.5 MG: 7.5 TABLET ORAL at 09:54

## 2023-04-03 RX ADMIN — TRAZODONE HYDROCHLORIDE 100 MG: 50 TABLET ORAL at 20:33

## 2023-04-03 RX ADMIN — BUSPIRONE HYDROCHLORIDE 7.5 MG: 7.5 TABLET ORAL at 14:16

## 2023-04-03 RX ADMIN — WHITE PETROLATUM 1 G: 1.75 OINTMENT TOPICAL at 20:28

## 2023-04-03 RX ADMIN — ALBUTEROL SULFATE 2 PUFF: 90 AEROSOL, METERED RESPIRATORY (INHALATION) at 09:53

## 2023-04-03 RX ADMIN — FLUTICASONE PROPIONATE 1 SPRAY: 50 SPRAY, METERED NASAL at 19:53

## 2023-04-03 RX ADMIN — FLUTICASONE PROPIONATE 1 SPRAY: 50 SPRAY, METERED NASAL at 09:53

## 2023-04-03 RX ADMIN — NICOTINE POLACRILEX 2 MG: 2 LOZENGE ORAL at 19:47

## 2023-04-03 RX ADMIN — NICOTINE POLACRILEX 4 MG: 4 GUM, CHEWING BUCCAL at 21:27

## 2023-04-03 RX ADMIN — BUSPIRONE HYDROCHLORIDE 7.5 MG: 7.5 TABLET ORAL at 19:53

## 2023-04-03 RX ADMIN — QUETIAPINE FUMARATE 600 MG: 300 TABLET ORAL at 20:27

## 2023-04-03 RX ADMIN — NICOTINE POLACRILEX 2 MG: 4 GUM, CHEWING BUCCAL at 19:52

## 2023-04-03 ASSESSMENT — ACTIVITIES OF DAILY LIVING (ADL)
ADLS_ACUITY_SCORE: 28
DRESS: INDEPENDENT
ADLS_ACUITY_SCORE: 28
ORAL_HYGIENE: INDEPENDENT
DRESS: INDEPENDENT
ADLS_ACUITY_SCORE: 28
ORAL_HYGIENE: INDEPENDENT
ADLS_ACUITY_SCORE: 28
ADLS_ACUITY_SCORE: 28
HYGIENE/GROOMING: INDEPENDENT
HYGIENE/GROOMING: INDEPENDENT

## 2023-04-03 NOTE — PLAN OF CARE
Pt slept in per usual. Pt attended group and watches tv in the lounge. Minimally social with peers.  Withdrawn. Pt declined lactose and nicotine patch this am. Requested PRN gabapentin and inhaler with relief in anxiety and SOB. Pt came to nurses station to get am medications after waking without prompting. Pt denies SI and SIB. Denies hallucinations.  Pt has been much brighter, polite and cooperative this shift.     Goal Outcome Evaluation: ongoing

## 2023-04-03 NOTE — PLAN OF CARE
Problem: Anxiety  Goal: Anxiety Reduction or Resolution  Outcome: Progressing   Goal Outcome Evaluation:    Plan of Care Reviewed With: patient

## 2023-04-03 NOTE — PLAN OF CARE
Assessment/Intervention/Current Symtoms and Care Coordination:  -Chart review    WR contacted [facility] to make referral for [place] choice assessment, was unable to get through, will try again tomorrow.    Current Symptoms include the following: Psychosis and disorganization  Precautions: Elopement and Cheeking     Discharge Plan or Goal:  Pending stabilization & development of a safe discharge plan.  Considerations include: IRTS versus [facility] with plan to pursue group home placement      Barriers to Discharge:  Asha presents with disorganized thought process and paranoia. She requires symptom stabilization, medication management, and supportive discharge plan.      Referral Status:  IRTS referrals (initiated by  Sanford)  Shamika Nuno [facility] left message on 3/27 requesting update  Jannet Ngo - jason with recommendation to pursue higher level of care     Referred to [facility] (initiated by  Sanford)      Legal Status:  Patient is under an MICD Commitment in [facility]   Case No. [id number]     Team submitted petition for [facility] on 3/28/2023. Hearing is on 4/10/2023.  (Proposed [facility] Medications: Thorazine, Seroquel, Abilify, and Risperdal)      Contacts:  [facility]  - Sanford Marinelli (phone: [phone], email: [email or web address])  [facility] Commitment  - Tyesha Early (phone: [phone])     Upcoming Meetings/Important Dates:  Wednesday, April 5 at 10am on teams - Care Conference with Sanford  Monday, April 10 at 9:30am via Zoom - [facility] Hearing     Rationale for SIO/No Roommate Order:  Patient is not on SIO.  Patient has current roommate.

## 2023-04-03 NOTE — PLAN OF CARE
"  Problem: Anxiety  Goal: Anxiety Reduction or Resolution  4/2/2023 2223 by Delphine Haynes, RN  Outcome: Progressing  4/2/2023 2127 by Delphine Haynes RN  Outcome: Progressing   Goal Outcome Evaluation:    Plan of Care Reviewed With: patient        Patient was visible in the milieu. Patient presented with a bright affect and tense mood. Patient engaged in various activities throughout the shift. Patient played card games, and watched movie with peers. Patient interacted with select peers. Patient was guarded during assessment. Patient appears tense, pulse was slightly elevated (105), although she denies anxiety.Patient requested prn Gabapentin, appeared anxious although she would not rate it. Patient did not want to take Hydroxyzine. Patient denies pain. Patient denies depression, SI, HI, auditory, and visual hallucinations. Contracts for safety. Patient was observed snacking in between meals several times. Patient ate 100% dinner. Patient was med compliant. Patient requested prn Trazodone for insomnia. Patient denies adverse effects.Staff will continue to monitor and follow care plan. Other prn that was given per patient request was Nicotine gum.Patient does not have Nicotine patch on since she declined it day shift. Patient told the writer \"I don't like Nicotine patch\".           "

## 2023-04-04 LAB — SARS-COV-2 RNA RESP QL NAA+PROBE: NEGATIVE

## 2023-04-04 PROCEDURE — 124N000002 HC R&B MH UMMC

## 2023-04-04 PROCEDURE — G0177 OPPS/PHP; TRAIN & EDUC SERV: HCPCS

## 2023-04-04 PROCEDURE — 250N000013 HC RX MED GY IP 250 OP 250 PS 637: Performed by: PSYCHIATRY & NEUROLOGY

## 2023-04-04 PROCEDURE — U0003 INFECTIOUS AGENT DETECTION BY NUCLEIC ACID (DNA OR RNA); SEVERE ACUTE RESPIRATORY SYNDROME CORONAVIRUS 2 (SARS-COV-2) (CORONAVIRUS DISEASE [COVID-19]), AMPLIFIED PROBE TECHNIQUE, MAKING USE OF HIGH THROUGHPUT TECHNOLOGIES AS DESCRIBED BY CMS-2020-01-R: HCPCS | Performed by: PSYCHIATRY & NEUROLOGY

## 2023-04-04 PROCEDURE — 99232 SBSQ HOSP IP/OBS MODERATE 35: CPT | Performed by: PSYCHIATRY & NEUROLOGY

## 2023-04-04 RX ADMIN — CETIRIZINE HYDROCHLORIDE 10 MG: 10 TABLET, FILM COATED ORAL at 09:17

## 2023-04-04 RX ADMIN — NICOTINE 1 PATCH: 21 PATCH, EXTENDED RELEASE TRANSDERMAL at 09:18

## 2023-04-04 RX ADMIN — QUETIAPINE FUMARATE 600 MG: 300 TABLET ORAL at 21:05

## 2023-04-04 RX ADMIN — Medication 6000 UNITS: at 12:46

## 2023-04-04 RX ADMIN — BENZOYL PEROXIDE: 50 GEL TOPICAL at 21:05

## 2023-04-04 RX ADMIN — Medication 6000 UNITS: at 17:58

## 2023-04-04 RX ADMIN — FLUTICASONE PROPIONATE 1 SPRAY: 50 SPRAY, METERED NASAL at 21:05

## 2023-04-04 RX ADMIN — GABAPENTIN 300 MG: 300 CAPSULE ORAL at 09:16

## 2023-04-04 RX ADMIN — TRAZODONE HYDROCHLORIDE 100 MG: 50 TABLET ORAL at 21:11

## 2023-04-04 RX ADMIN — BUSPIRONE HYDROCHLORIDE 7.5 MG: 7.5 TABLET ORAL at 14:13

## 2023-04-04 RX ADMIN — BUSPIRONE HYDROCHLORIDE 7.5 MG: 7.5 TABLET ORAL at 21:05

## 2023-04-04 RX ADMIN — NICOTINE POLACRILEX 4 MG: 4 GUM, CHEWING BUCCAL at 09:17

## 2023-04-04 RX ADMIN — ALBUTEROL SULFATE 1 PUFF: 90 AEROSOL, METERED RESPIRATORY (INHALATION) at 21:14

## 2023-04-04 RX ADMIN — NICOTINE POLACRILEX 4 MG: 4 GUM, CHEWING BUCCAL at 22:06

## 2023-04-04 RX ADMIN — Medication 1 LOZENGE: at 22:06

## 2023-04-04 RX ADMIN — NICOTINE POLACRILEX 4 MG: 4 GUM, CHEWING BUCCAL at 12:47

## 2023-04-04 RX ADMIN — GABAPENTIN 300 MG: 300 CAPSULE ORAL at 21:16

## 2023-04-04 RX ADMIN — OMEPRAZOLE 20 MG: 20 CAPSULE, DELAYED RELEASE ORAL at 09:16

## 2023-04-04 RX ADMIN — NICOTINE POLACRILEX 4 MG: 4 GUM, CHEWING BUCCAL at 19:55

## 2023-04-04 RX ADMIN — BUSPIRONE HYDROCHLORIDE 7.5 MG: 7.5 TABLET ORAL at 09:16

## 2023-04-04 RX ADMIN — NICOTINE POLACRILEX 4 MG: 4 GUM, CHEWING BUCCAL at 14:14

## 2023-04-04 RX ADMIN — ALBUTEROL SULFATE 2 PUFF: 90 AEROSOL, METERED RESPIRATORY (INHALATION) at 09:15

## 2023-04-04 RX ADMIN — PRENATAL VITAMINS-IRON FUMARATE 27 MG IRON-FOLIC ACID 0.8 MG TABLET 1 TABLET: at 09:16

## 2023-04-04 RX ADMIN — FLUTICASONE PROPIONATE 1 SPRAY: 50 SPRAY, METERED NASAL at 09:16

## 2023-04-04 RX ADMIN — WHITE PETROLATUM: 1.75 OINTMENT TOPICAL at 21:05

## 2023-04-04 ASSESSMENT — ACTIVITIES OF DAILY LIVING (ADL)
ADLS_ACUITY_SCORE: 28
LAUNDRY: WITH SUPERVISION
ADLS_ACUITY_SCORE: 28
DRESS: INDEPENDENT
ADLS_ACUITY_SCORE: 28
HYGIENE/GROOMING: INDEPENDENT
ADLS_ACUITY_SCORE: 28
DRESS: INDEPENDENT
ADLS_ACUITY_SCORE: 28
ORAL_HYGIENE: INDEPENDENT
ADLS_ACUITY_SCORE: 28
ORAL_HYGIENE: INDEPENDENT
HYGIENE/GROOMING: INDEPENDENT
ADLS_ACUITY_SCORE: 28

## 2023-04-04 NOTE — PLAN OF CARE
Pt woke in the lounge after sleeping there last night. Informed pt that staff were working on a plan for her room/roommate. Pt agrees to come to staff if she feels unsafe or that she cannot use her room today. Pt denies SI and SIB. Pt requested prn gabapentin and inhaler per usual with her am medications and said they were helpful. Pt denies lactose this am and diet was changed so she can get milk and pizza on her menu. Pt did agree to use nicotine patch and stated she thought someone was giving her the wrong patch before. Denies hallucination. Pt attended group. Polite and cooperative.     Goal Outcome Evaluation: ongoing

## 2023-04-04 NOTE — PLAN OF CARE
Assessment/Intervention/Current Symtoms and Care Coordination:  -Chart review  -Team meeting     -Called [facility] and referred pt for [place] assessment. CTC to call them if pt discharges before she has assessment in order to update discharge address.     -Writer informed pt of the care conference meeting tomorrow at 10am. She expressed understanding.     Current Symptoms include the following: Psychosis, disorganization, patient is irritable  Precautions: Elopement and Cheeking     Discharge Plan or Goal:  Pending stabilization & development of a safe discharge plan.  Considerations include: IRTS with plan to pursue group home placement     Barriers to Discharge:  Asha presents with disorganized thought process and paranoia. She requires symptom stabilization, medication management, and supportive discharge plan.      Referral Status:  IRTS referrals (initiated by  Sanford)     [facility]: [address] [place] [address] (WR left msg looking for update on 4/4)  [place] [address]  [facility]  Phone:  [phone]  Fax:  [phone]     [facility]: (Declined due to program feels Walter requires higher level of care)  [address] [place] [id number]  Phone: [phone]     Legal Status:  Patient is under an MICD Commitment in [facility]   Case No. [id number]     Contacts:    [facility]  - Sanford Marinelli (phone: [phone], email: [email or web address])    Tyesha Early - commitment  with [facility] ([phone])     Upcoming Meetings/Important Dates:  Wednesday April 5 at 10 am Care conference with community CAMILO Christina and JOAN [facility] Hearing Monday April 10th at 9:30 am     Rationale for SIO/No Roommate Order:  Patient is not on SIO.  Patient has current roommate.

## 2023-04-04 NOTE — PLAN OF CARE
Occupational Therapy Group Note:       04/04/23 1442   Group Therapy Session   Group Attendance attended group session   Time Session Began 1315   Time Session Ended 1400   Total Time (minutes) 45   Total # Attendees 5   Group Type recreation   Group Topic Covered leisure exploration/use of leisure time;structured socialization   Group Session Detail OT Leisure Group   Patient Response/Contribution confronted peers appropriately;cooperative with task;listened actively;offered helpful suggestions to peers   Patient Participation Detail Patient actively engaged in a occupational therapy group with leisure exploration and engagement being the topic and focus. Leisure exploration offered for increased intrinsic motivation to engage in social situations with peers and exercise cognitive skills. Patient was familiar with game and demonstrated good recall of understanding of game. No additional cueing needed. Needed assist with basic mathematical calculations and was receptive to peers providing help. Patient maintained attention for full duration of group. Demonstrated good patience when peer needed increased processing time. Calm, cooperative, pleasant participant.

## 2023-04-04 NOTE — PROGRESS NOTES
"Behavioral Health  Note    Behavioral Health  Spirituality Group Note    [address]    Name:    Asha Arrieta                                                                YOB: 1995    MRN:     [id number]                                                                       Age: 27 year old      Patient attended -led group, which included discussion of spirituality, coping with illness and building resilience. Today's topic was Peace.    Patient attended group for [facility] - spirituality groups are not billed.    The patient actively participated in group discussion and patient demonstrated an appreciation of topic's application for their personal circumstances.    Asha shared that doing tristian art brings her peace as well as being with her children. She recalled a time when she felt peace in her body while pregnant, when she felt full and satisfied. Asha noted that fall is a peaceful time for her, with the changing colors of the leaves and her birthday. She described a special peaceful place: when she and her \"2nd baby daddy\" were in love and enjoying each other's company.      Cori Shah, [facility]  Resident   Pager: [phone]    "

## 2023-04-04 NOTE — PLAN OF CARE
NOC Shift Report     Pt in bed at beginning of shift, breathing quiet and unlabored. Pt slept through shift. Pt slept 6.5 hours.   Pt slept on a bed chair in the lounge area, no issues noted.      No pt complaints or concerns at this time.      No PRNs given. Will continue to monitor.     Goal Outcome Evaluation:   Problem: Sleep Disturbance  Goal: Adequate Sleep/Rest  Outcome: Progressing

## 2023-04-04 NOTE — PLAN OF CARE
Problem: Plan of Care - These are the overarching goals to be used throughout the patient stay.    Goal: Absence of Hospital-Acquired Illness or Injury  Outcome: Progressing   Goal Outcome Evaluation:    Plan of Care Reviewed With: patient    Patient was visible in the milieu. Patient presented with a bright affect and guarded mood. Patient was observed laughing inappropriately occasionally. Patient attended therapy group. Patient listened to music over the head phones.Patient also watched movie with peers. Patient ate 100% dinner and hs snack. Patient requested snacks in between meals.Patient denies pain. Patient denies anxiety, depression, SI, HI, auditory, and visual hallucinations. Contracts for safety. Patient requested prn Gabapentin at hs but will not rate anxiety, patient appeared restless. Patient also requested prn Nicorette gum a couple times. Other prn requested was Trazodone for insomnia. Staff will continue to monitor and follow care plan. Patient denies medication side effects.Patient took shower and did laundry with supervision.

## 2023-04-04 NOTE — PROGRESS NOTES
04/04/23 1821   Group Therapy Session   Group Attendance attended group session   Time Session Began 1600   Time Session Ended 1645   Total Time (minutes) 30   Total # Attendees 5   Group Type life skill   Group Session Detail Group discussion on the power of positive thinking, practice reading affirmations and making an Affirmation Calendar for building self-esteem, reframing negative to positive thoughts, improving self-awareness, creative expression, coping, mood stabilization, reality-based activity, attention to detail, concentration, organization/planning, follow through and socialization.   Patient Participation Detail Pt joined group late, but was still eager to begin work on a calendar.  Pt was able to gather her own supplies and worked independently with a brief introduction and a sample.  Pt sat at a table with therapist, but interacted socially only if asked a direct question as she was listening to her headphones while working.  Pt was polite and considerate of others.  She volunteered to help clean up her area when she was done with her project.  No charge.

## 2023-04-04 NOTE — PROGRESS NOTES
Patient came out of her room because her roommate was loud. Patient declined to go back in her room even after her roommate stopped yelling. ANS was updated. ANS suggested that we look for a recliner. Patient was asked whether she is okay with the offer. Patient was agreeable with the plan. Patient is now sleeping in the recliner in the lounge. Staff will continue to monitor and assist as needed.

## 2023-04-04 NOTE — PROGRESS NOTES
"[facility], [place]   Psychiatric Progress Note  Hospital Day: 14        Interim History:   The patient's care was discussed with the treatment team during the daily team meeting and/or staff's chart notes were reviewed.  Staff report patient has been visible in the milieu, attending some groups, still labile at times, irritable, taking prescribed medications along with some PRNs for anxiety, poor sleep last night due to disruptive roommate.     Upon interview, pt reports mood is \"good\", writer provided positive feedback on patients flexibility over past day due to issues with roommate. Patient stated she is doing \"okay\", denies safety concerns including SI or HI, denies AVH. Asked about another medication other than Seroquel as she states its \"not effective\", reviewed that she has been doing quite well and would recommend continuing this medication, she states the med on LP was \"immediate release\" and reviewed that is what her quetiapine is currently due to wanting the white colored pills instead of yellow. She stated \"oh\". She had requests about being able to have pizza and other items on her menu, will review with RN to try and address. No additional concerns.          Medications:       benzoyl peroxide   Topical At Bedtime     busPIRone  7.5 mg Oral TID     cetirizine  10 mg Oral Daily     fluticasone  1 spray Both Nostrils BID     lactase  6,000 Units Oral TID w/meals     mineral oil-hydrophilic petrolatum   Topical At Bedtime     nicotine  1 patch Transdermal Daily     nicotine   Transdermal Q8H     omeprazole  20 mg Oral Daily     prenatal multivitamin w/iron  1 tablet Oral Daily     QUEtiapine  600 mg Oral At Bedtime          Allergies:     Allergies   Allergen Reactions     Haloperidol Other (See Comments)     Shakes      Morphine Hives     Dust Mite Extract      Watery eyes and runny nose     Pollen Extract      Watery eyes and stuffy nose     Zyprexa [Olanzapine]      Skin " rash, wants to talk to her MD about it          Labs:     No results found for this or any previous visit (from the past 48 hour(s)).       Psychiatric Examination:     BP (!) 136/91 (BP Location: Left arm, Patient Position: Sitting, Cuff Size: Adult Regular)   Pulse 64   Temp 97.9  F (36.6  C) (Oral)   Resp 16   Ht 1.524 m (5')   Wt 88.5 kg (195 lb 3.2 oz)   SpO2 100%   BMI 38.12 kg/m    Weight is 195 lbs 3.2 oz  Body mass index is 38.12 kg/m .    Orthostatic Vitals       Most Recent      Sitting Orthostatic /88 04/04 0900    Sitting Orthostatic Pulse (bpm) 118 04/04 0900    Standing Orthostatic /92 04/04 0900    Standing Orthostatic Pulse (bpm) 125 04/04 0900        Appearance: awake, alert and adequately groomed  Attitude:  more cooperative  Eye Contact:  fair  Mood:  a bit less labile, more periods of calm behavior, less irritability   Affect:  mood congruent, less labile  Speech:  appropriate volume, normal prosody  Language: fluent and intact in English  Psychomotor, Gait, Musculoskeletal:  no evidence of tardive dyskinesia, dystonia, or tics  Thought Process:  tangential, illogical around treatment/medications  Associations:  no loose associations  Thought Content:  no evidence of suicidal ideation or homicidal ideation and denies AVH, appears paranoid/guarded  Insight:  limited  Judgement:  limited  Oriented to:  time, person, and place  Attention Span and Concentration:  limited  Recent and Remote Memory:  limited  Fund of Knowledge:  appropriate    Clinical Global Impressions  First:  Considering your total clinical experience with this particular patient population, how severe are the patient's symptoms at this time?: 7 (03/21/23 1752)  Compared to the patient's condition at the START of treatment, this patient's condition is: 4 (03/21/23 1752)  Most recent:  Considering your total clinical experience with this particular patient population, how severe are the patient's symptoms at this  "time?: 6 (23 1247)  Compared to the patient's condition at the START of treatment, this patient's condition is: 3 (23 124)         Precautions:     Behavioral Orders   Procedures     Cheeking Precautions (behavioral units)     Code 1 - Restrict to Unit     Elopement precautions     Routine Programming     As clinically indicated     Status 15     Every 15 minutes.          Diagnoses:      Schizoaffective disorder, depressed type vs bipolar type, with acute decompensation in psychosis symptoms and some mood instability (provisional)  Unspecified anxiety   Polysubstance use disorder including opioid and methamphetamine   Nicotine use disorder  R/O substance induced psychosis   Seasonal allergies and rhinitis   Asthma   Obesity   Type 2 DM poorly controlled          Assessment & Plan:   Assessment and hospital summary:  This patient is a 27 year old female under commitment in [facility] with history of psychosis and polysubstance use who presented to ED from [facility] due to concerns for worsening psychosis despite medication changes and inability to engage in program. She was discharged from , ED staff informed patients PD was revoked and she is now being admitted under committed status with plan to discharge to [facility] once stable per her [facility] CM. She was not cooperative with admission interview, majority of history obtained from chart review, will continue current medications as noted in chart, ordered admission labs, plan to coordinate with patients CM regarding disposition as patient stabilizes. Patient has continued to show limited insight into psychiatric symptoms and need for treatment, is illogical in requests around medications and willingness to take medications, fixated on the color of medications or concerned they are \"the right one\" which appears based in paranoia/delusions. Appears Lyons had , writer submitted request for a new Lyons 3/28/23.      Inpatient psychiatric " hospitalization is warranted at this time for safety, stabilization, and possible adjustment in medications.    Psychiatric treatment/inteventions:  Medications:   -continue PTA quetiapine (now in IR formulation) at 600mg at bedtime with additional IR 100mg BID PRN for agitation and psychosis (changed from XR due to patient declining to take XR medications due to pills being yellow in color)  -continue PTA buspirone 7.5mg TID for anxiety, plan to increase as indicated/toerlated     -continue PRN benzotropine 1mg BID for possible EPSE   -PRN hydroxyzine 25mg every 4 hour for anxiety  -continue PRN trazodone 100mg at bedtime for sleep   -PRN thorazine 10mg PO or 25mg IM with benadryl 50mg PO or IM TID for agitation/psychosis (pt has olanzapine listed as allergy)  -clonidine 0.1mg BID PRN for anxiety given some recent elevated HR and BPs, hold parameters in place     -also ordered PRN aquaphor for dry skin as PRN vanicream not available. Continue Lactaid for lactose intolerance, nicotine patch and prenatal vitamin per pts request    The risks, benefits, alternatives and side effects have been discussed and are understood by the patient.     Laboratory/Imaging: no new labs ordered      Patient will be treated in therapeutic milieu with appropriate individual and group therapies as described.     Medical treatment/interventions:  Medical concerns: Pt denying acute medical concerns, will continue PTA flonase and zyrtec for allergies, PRN albuterol inhaler for asthma and nicotine gum for nicotine withdrawal. Elevated HgbA1c and abnormal lipid panel, IM consult placed 3/23, per note:   Asha Arrieta is a 27 year old female admitted on 3/15/2023. She has a past medical history of T2DM, schizoaffective disorder (depressed versus bipolar type), polysubstance use disorder (opioid, methamphetamine, tobacco), seasonal allergies, asthma who was admitted after presenting to the ED from [facility] for worsening psychosis.   "Medicine consulted for diabetes management.     Type 2 Diabetes Mellitus, non-insulin-dependent, poorly controlled  A1c this morning 8.1%. Prior to this was 7.4% on 10/17/2022 in Care Everywhere. According to outside medication record, patient had been on Metformin 500 mg daily in the past but stated that she had side effects and would not elaborate further.  Limited data available for review, but glucose trends appear to be elevated above goal (190s-250s).  Patient unwilling to discuss diabetes with writer or \"anyone from this hospital\".  She vehemently denied having diabetes despite writer discussing that her A1c currently categorizes her as having diabetes, and became uncooperative.   - Discussed patient case with Dr. Donal Abrams. Should the patient be open to discussing diabetes with Internal Medicine, we are happy to come back and discuss management with the patient.  - Follow-up with PCP otherwise     Hyperlipidemia, mixed  Lipid panel drawn per psychiatry on admission showing low HDL, high LDL, high triglycerides.  - Follow-up with PCP within 1-2 weeks after discharge     Medicine will sign off. No further recommendations at this time.  Please feel free to reconsult if any new medical issues or concerns.         The patient's care was discussed with the Bedside Nurse and Patient, Primary Team (Dr. Katie Abrams).        Clinically Significant Risk Factors []Expand by Default                         # DMII: A1C = 8.1 % (Ref range: <5.7 %) within past 6 months, PRESENT ON ADMISSION  # Obesity: Estimated body mass index is 37.09 kg/m  as calculated from the following:    Height as of this encounter: 1.524 m (5').    Weight as of this encounter: 86.1 kg (189 lb 14.4 oz)., PRESENT ON ADMISSION             Toby Rendon PA-C  Hospitalist Service      Disposition Plan   Reason for ongoing admission: is unable to care for self due to severe psychosis or luli  [email or web address] vs [facility] vs " [facility], see CTC notes   Discharge Medications: not ordered  Follow-up Appointments: not scheduled  Legal Status: full commitment, appears Lyons , CTC looking into process for filing new Lyons with current commitment, new Lyons request sent to [facility] 3/28, requested thorazine, quetiapine, aripiprazole, risperidone     This document is created with the help of Dragon dictation system.  All grammatical/typing errors or context distortion are unintentional and inherent to software.    Patient has been seen and evaluated by me, Katie Abrams DO.

## 2023-04-04 NOTE — PROGRESS NOTES
04/03/23 1900   Group Therapy Session   Group Attendance attended group session   Time Session Began 1600   Time Session Ended 1645   Total Time (minutes) 35   Total # Attendees 4   Group Type recreation   Group Topic Covered leisure exploration/use of leisure time   Group Session Detail TR leisure group   Patient Response/Contribution cooperative with task   Patient Participation Detail Pt participated in Therapeutic Recreation group with focus on leisure participation, communication skills, and critical thinking. Engaged and focused in the group recreational activity via a group game.  Pt entered group late, but was a full participant throughout the group. Pt appeared to brighten with social interaction.

## 2023-04-05 PROCEDURE — 250N000013 HC RX MED GY IP 250 OP 250 PS 637: Performed by: PSYCHIATRY & NEUROLOGY

## 2023-04-05 PROCEDURE — 99232 SBSQ HOSP IP/OBS MODERATE 35: CPT | Performed by: PSYCHIATRY & NEUROLOGY

## 2023-04-05 PROCEDURE — 124N000002 HC R&B MH UMMC

## 2023-04-05 PROCEDURE — G0177 OPPS/PHP; TRAIN & EDUC SERV: HCPCS

## 2023-04-05 RX ADMIN — BENZOYL PEROXIDE: 50 GEL TOPICAL at 20:49

## 2023-04-05 RX ADMIN — BUSPIRONE HYDROCHLORIDE 7.5 MG: 7.5 TABLET ORAL at 10:04

## 2023-04-05 RX ADMIN — Medication 6000 UNITS: at 18:03

## 2023-04-05 RX ADMIN — WHITE PETROLATUM: 1.75 OINTMENT TOPICAL at 20:49

## 2023-04-05 RX ADMIN — Medication 1 LOZENGE: at 20:58

## 2023-04-05 RX ADMIN — GABAPENTIN 300 MG: 300 CAPSULE ORAL at 10:04

## 2023-04-05 RX ADMIN — BUSPIRONE HYDROCHLORIDE 7.5 MG: 7.5 TABLET ORAL at 19:41

## 2023-04-05 RX ADMIN — NICOTINE POLACRILEX 4 MG: 4 GUM, CHEWING BUCCAL at 20:58

## 2023-04-05 RX ADMIN — BUSPIRONE HYDROCHLORIDE 7.5 MG: 7.5 TABLET ORAL at 14:44

## 2023-04-05 RX ADMIN — NICOTINE POLACRILEX 4 MG: 4 GUM, CHEWING BUCCAL at 16:13

## 2023-04-05 RX ADMIN — Medication 6000 UNITS: at 12:46

## 2023-04-05 RX ADMIN — FLUTICASONE PROPIONATE 1 SPRAY: 50 SPRAY, METERED NASAL at 10:05

## 2023-04-05 RX ADMIN — OMEPRAZOLE 20 MG: 20 CAPSULE, DELAYED RELEASE ORAL at 10:04

## 2023-04-05 RX ADMIN — FLUTICASONE PROPIONATE 1 SPRAY: 50 SPRAY, METERED NASAL at 19:43

## 2023-04-05 RX ADMIN — Medication 6000 UNITS: at 10:04

## 2023-04-05 RX ADMIN — NICOTINE POLACRILEX 4 MG: 4 GUM, CHEWING BUCCAL at 10:03

## 2023-04-05 RX ADMIN — NICOTINE POLACRILEX 4 MG: 4 GUM, CHEWING BUCCAL at 19:40

## 2023-04-05 RX ADMIN — CETIRIZINE HYDROCHLORIDE 10 MG: 10 TABLET, FILM COATED ORAL at 10:04

## 2023-04-05 RX ADMIN — TRAZODONE HYDROCHLORIDE 100 MG: 50 TABLET ORAL at 20:51

## 2023-04-05 RX ADMIN — NICOTINE 1 PATCH: 21 PATCH, EXTENDED RELEASE TRANSDERMAL at 10:03

## 2023-04-05 RX ADMIN — PRENATAL VITAMINS-IRON FUMARATE 27 MG IRON-FOLIC ACID 0.8 MG TABLET 1 TABLET: at 10:04

## 2023-04-05 RX ADMIN — QUETIAPINE FUMARATE 600 MG: 300 TABLET ORAL at 20:51

## 2023-04-05 RX ADMIN — GABAPENTIN 300 MG: 300 CAPSULE ORAL at 19:41

## 2023-04-05 ASSESSMENT — ACTIVITIES OF DAILY LIVING (ADL)
ADLS_ACUITY_SCORE: 28
ORAL_HYGIENE: INDEPENDENT
ADLS_ACUITY_SCORE: 28
HYGIENE/GROOMING: INDEPENDENT
ADLS_ACUITY_SCORE: 28
ADLS_ACUITY_SCORE: 28
DRESS: INDEPENDENT
DRESS: INDEPENDENT
HYGIENE/GROOMING: INDEPENDENT
ADLS_ACUITY_SCORE: 28
ORAL_HYGIENE: INDEPENDENT
ADLS_ACUITY_SCORE: 28
ADLS_ACUITY_SCORE: 28

## 2023-04-05 NOTE — PLAN OF CARE
Assessment/Intervention/Current Symtoms and Care Coordination:  -Chart review  -Team meeting     DIONNE received call from [facility] ([facility]) requesting records for referral- DIONNE discussed with Sanford PAGE- sent requested documents by FAX to [facility] at [phone].    Asha had care conference today via teams with  Sanford and DIONNE. CAMILO explained to Asha long term plan for group home, Asha was accepting of this. Sanford explained short term plan of IRTS or [facility]. Asha was more skeptical about this, she described a situation at an IRTS in the past that was stressful for her. DIONNE and Sanford listened, provided validation and answered questions. Asha was eventually accepting of plan for IRTS and then group  homes. She is aware that her commitment could  or be re-petitioned in July. Sanford emphasized that Asha's ability to cooperate with providers, stay on medications, and not use substances will be a part of that determination.     DIONNE sent referrals to following IRTS:  Zia Lorenzo  [facility]     Current Symptoms include the following: Psychosis, disorganization, patient is irritable  Precautions: Elopement and Cheeking     Discharge Plan or Goal:  Pending stabilization & development of a safe discharge plan.  Considerations include: IRTS with plan to pursue group home placement     Barriers to Discharge:  Asha presents with disorganized thought process and paranoia. She requires symptom stabilization, medication management, and supportive discharge plan.      Referral Status:  IRTS referrals     [facility] (WR left msg looking for update on )  [place] [address]  [facility]  Phone:  [phone]  Fax:  [phone]     [place] [facility]:   [address] [place] [address]  Phone: [phone]     [facility]ia Alvarez Referral sent   [facility] Referral sent  4/5  [facility] Referral sent 4/5   [facility] Referral sent 4/5  [facility]  [facility] Referral sent 4/5  [facility] Referral sent 4/5    Legal Status:  Patient is under an MICD Commitment in [facility]   Case No. [id number]     Contacts:    [facility]  - Sanford Marinelli (phone: [phone], email: [email or web address])    Tyesha Early - commitment  with [facility] ([phone])     Upcoming Meetings/Important Dates:  Wednesday April 5 at 10 am Care conference with community CAMILO Christina and JOAN Lyons Hearing Monday April 10th at 9:30 am     Rationale for SIO/No Roommate Order:  Patient is not on SIO.  Patient has current roommate.

## 2023-04-05 NOTE — PROGRESS NOTES
"[facility], [place]   Psychiatric Progress Note  Hospital Day: 15        Interim History:   The patient's care was discussed with the treatment team during the daily team meeting and/or staff's chart notes were reviewed.  Staff report patient was visible in milieu, attending groups, taking medications as prescribed, later expressed displeasure with having a roommate, engaged in some disruptive behaviors, redirected by staff, slept 6.25 hours.     Upon interview, patient had just finished her care conference, reports it went \"okay\", from her report they discussed going to an IRTS or Group home, she is okay with this plan. She is tolerating medications, denies side effects. Denies SI, HI or AVH. She states she got upset at her roommate last night as she believes her roommate was working with the previous roommate she had that came into her room the previous night and was yelling at her and working against her. Writer provided reassurance that the patients did not know each other and that team is working to make room changes, patient is open to having a different roommate as it worked well previously. No additional concerns.          Medications:       benzoyl peroxide   Topical At Bedtime     busPIRone  7.5 mg Oral TID     cetirizine  10 mg Oral Daily     fluticasone  1 spray Both Nostrils BID     lactase  6,000 Units Oral TID w/meals     mineral oil-hydrophilic petrolatum   Topical At Bedtime     nicotine  1 patch Transdermal Daily     nicotine   Transdermal Q8H     omeprazole  20 mg Oral Daily     prenatal multivitamin w/iron  1 tablet Oral Daily     QUEtiapine  600 mg Oral At Bedtime          Allergies:     Allergies   Allergen Reactions     Haloperidol Other (See Comments)     Shakes      Morphine Hives     Dust Mite Extract      Watery eyes and runny nose     Pollen Extract      Watery eyes and stuffy nose     Zyprexa [Olanzapine]      Skin rash, wants to talk to her MD about it       "    Labs:     Recent Results (from the past 48 hour(s))   Asymptomatic COVID-19 Virus (Coronavirus) by PCR Nasopharyngeal    Collection Time: 04/04/23  8:54 PM    Specimen: Nasopharyngeal; Swab   Result Value Ref Range    SARS CoV2 PCR Negative Negative          Psychiatric Examination:     /87 (BP Location: Left arm)   Pulse 103   Temp 97.9  F (36.6  C) (Oral)   Resp 16   Ht 1.524 m (5')   Wt 88.5 kg (195 lb 3.2 oz)   SpO2 99%   BMI 38.12 kg/m    Weight is 195 lbs 3.2 oz  Body mass index is 38.12 kg/m .    Orthostatic Vitals       Most Recent      Sitting Orthostatic /87 04/05 1157    Sitting Orthostatic Pulse (bpm) 107 04/05 1157    Standing Orthostatic /91 04/05 1157    Standing Orthostatic Pulse (bpm) 112 04/05 1157        Appearance: awake, alert and adequately groomed  Attitude:  more cooperative  Eye Contact:  fair  Mood:  a bit less labile, more periods of calm behavior, less irritability   Affect:  mood congruent, less labile  Speech:  appropriate volume, normal prosody  Language: fluent and intact in English  Psychomotor, Gait, Musculoskeletal:  no evidence of tardive dyskinesia, dystonia, or tics  Thought Process:  tangential, continues to be illogical around treatment/medications at times   Associations:  no loose associations  Thought Content:  no evidence of suicidal ideation or homicidal ideation and denies AVH, appears paranoid/guarded, making paranoid statements   Insight:  limited  Judgement:  limited  Oriented to:  time, person, and place  Attention Span and Concentration:  limited  Recent and Remote Memory:  limited  Fund of Knowledge:  appropriate    Clinical Global Impressions  First:  Considering your total clinical experience with this particular patient population, how severe are the patient's symptoms at this time?: 7 (03/21/23 1752)  Compared to the patient's condition at the START of treatment, this patient's condition is: 4 (03/21/23 1752)  Most  "recent:  Considering your total clinical experience with this particular patient population, how severe are the patient's symptoms at this time?: 6 (03/30/23 1247)  Compared to the patient's condition at the START of treatment, this patient's condition is: 3 (03/30/23 1247)         Precautions:     Behavioral Orders   Procedures     Cheeking Precautions (behavioral units)     Code 1 - Restrict to Unit     Elopement precautions     Routine Programming     As clinically indicated     Status 15     Every 15 minutes.          Diagnoses:      Schizoaffective disorder, depressed type vs bipolar type, with acute decompensation in psychosis symptoms and some mood instability (provisional)  Unspecified anxiety   Polysubstance use disorder including opioid and methamphetamine   Nicotine use disorder  R/O substance induced psychosis   Seasonal allergies and rhinitis   Asthma   Obesity   Type 2 DM poorly controlled          Assessment & Plan:   Assessment and hospital summary:  This patient is a 27 year old female under commitment in [facility] with history of psychosis and polysubstance use who presented to ED from [facility] due to concerns for worsening psychosis despite medication changes and inability to engage in program. She was discharged from , ED staff informed patients PD was revoked and she is now being admitted under committed status with plan to discharge to [facility] once stable per her county CM. She was not cooperative with admission interview, majority of history obtained from chart review, will continue current medications as noted in chart, ordered admission labs, plan to coordinate with patients CM regarding disposition as patient stabilizes. Patient has continued to show limited insight into psychiatric symptoms and need for treatment, is illogical in requests around medications and willingness to take medications, fixated on the color of medications or concerned they are \"the right one\" which appears " based in paranoia/delusions. Appears Lyons had , writer submitted request for a new Lyons 3/28/23.      Inpatient psychiatric hospitalization is warranted at this time for safety, stabilization, and possible adjustment in medications.    Psychiatric treatment/inteventions:  Medications:   -continue PTA quetiapine (now in IR formulation) at 600mg at bedtime with additional IR 100mg BID PRN for agitation and psychosis (changed from XR due to patient declining to take XR medications due to pills being yellow in color)  -continue PTA buspirone 7.5mg TID for anxiety, plan to increase as indicated/toerlated     -continue PRN benzotropine 1mg BID for possible EPSE   -PRN hydroxyzine 25mg every 4 hour for anxiety  -continue PRN trazodone 100mg at bedtime for sleep   -PRN thorazine 10mg PO or 25mg IM with benadryl 50mg PO or IM TID for agitation/psychosis (pt has olanzapine listed as allergy)  -clonidine 0.1mg BID PRN for anxiety given some recent elevated HR and BPs, hold parameters in place     -also ordered PRN aquaphor for dry skin as PRN vanicream not available. Continue Lactaid for lactose intolerance, nicotine patch and prenatal vitamin per pts request    The risks, benefits, alternatives and side effects have been discussed and are understood by the patient.     Laboratory/Imaging: Covid ordered due to exposure on unit; negative      Patient will be treated in therapeutic milieu with appropriate individual and group therapies as described.     Medical treatment/interventions:  Medical concerns: Pt denying acute medical concerns, will continue PTA flonase and zyrtec for allergies, PRN albuterol inhaler for asthma and nicotine gum for nicotine withdrawal. Elevated HgbA1c and abnormal lipid panel, IM consult placed 3/23, per note:   Asha Arrieta is a 27 year old female admitted on 3/15/2023. She has a past medical history of T2DM, schizoaffective disorder (depressed versus bipolar type), polysubstance use  "disorder (opioid, methamphetamine, tobacco), seasonal allergies, asthma who was admitted after presenting to the ED from [facility] for worsening psychosis.  Medicine consulted for diabetes management.     Type 2 Diabetes Mellitus, non-insulin-dependent, poorly controlled  A1c this morning 8.1%. Prior to this was 7.4% on 10/17/2022 in Care Everywhere. According to outside medication record, patient had been on Metformin 500 mg daily in the past but stated that she had side effects and would not elaborate further.  Limited data available for review, but glucose trends appear to be elevated above goal (190s-250s).  Patient unwilling to discuss diabetes with writer or \"anyone from this hospital\".  She vehemently denied having diabetes despite writer discussing that her A1c currently categorizes her as having diabetes, and became uncooperative.   - Discussed patient case with Dr. Donal Abrams. Should the patient be open to discussing diabetes with Internal Medicine, we are happy to come back and discuss management with the patient.  - Follow-up with PCP otherwise     Hyperlipidemia, mixed  Lipid panel drawn per psychiatry on admission showing low HDL, high LDL, high triglycerides.  - Follow-up with PCP within 1-2 weeks after discharge     Medicine will sign off. No further recommendations at this time.  Please feel free to reconsult if any new medical issues or concerns.         The patient's care was discussed with the Bedside Nurse and Patient, Primary Team (Dr. Katie Abrams).        Clinically Significant Risk Factors []Expand by Default                         # DMII: A1C = 8.1 % (Ref range: <5.7 %) within past 6 months, PRESENT ON ADMISSION  # Obesity: Estimated body mass index is 37.09 kg/m  as calculated from the following:    Height as of this encounter: 1.524 m (5').    Weight as of this encounter: 86.1 kg (189 lb 14.4 oz)., PRESENT ON ADMISSION             Toby Rendon PA-C  Hospitalist " Service      Disposition Plan   Reason for ongoing admission: is unable to care for self due to severe psychosis or luli  Discharge location: [facility] facility vs [facility] vs , see CTC notes   Discharge Medications: not ordered  Follow-up Appointments: not scheduled  Legal Status: full commitment, appears Lyons , CTC looking into process for filing new Lyons with current commitment, new Lyons request sent to [facility] 3/28, requested thorazine, quetiapine, aripiprazole, risperidone     This document is created with the help of Dragon dictation system.  All grammatical/typing errors or context distortion are unintentional and inherent to software.    Patient has been seen and evaluated by Katie adams DO.

## 2023-04-05 NOTE — PLAN OF CARE
Problem: Adult Behavioral Health Plan of Care  Goal: Optimized Coping Skills in Response to Life Stressors  Outcome: Progressing     Pt was alert and oriented to place and self throughout shift.  She was intermittently visible in the milieu watching TV and participating in groups.  Pt slept in this morning but then was compliant with her medications and ate meals.  She endorsed anxiety and denied depression.  Pt requested PRN gabapentin for her anxiety with her morning medications along with nicotine gum.  She denied all symptoms of psychosis.  Pt also did not endorse any acute physical health concerns, pain, or side effects to medications on this shift.

## 2023-04-05 NOTE — PLAN OF CARE
"NOC Shift Report     Pt in bed at beginning of shift, breathing quiet and unlabored. Pt came out of the room, approached staff saying that, \" I don't like to have a roommate.\" Patient had no reason, roommate has been sleeping since the beginning of the shift, pt requested to have asleep chair, there was no reason for sleep chair in the lounge, encouraged pt to sleep in her room and room changes will be considered during the day. At around 0100 pt went to the room turned on all lights in the room, opened the door all the way while the roommate is asleep, came out complaining again if she can get a private room, was explained that will be done during the day. The roommate came out complaining of the pt's behavior in the room. Pt was reminded about the unit rules and protocols to respect each other as roommates, agreed to turn off lights, except the bathroom light, currently both sleeping without issues. Staff observing more frequently, room across/near nurse station. Pt slept 6.25 hours.      No PRNs given. Will continue to monitor.     Goal Outcome Evaluation:      Plan of Care Reviewed With: patient    Overall Patient Progress: improvingOverall Patient Progress: improving           "

## 2023-04-05 NOTE — PLAN OF CARE
Problem: Depressive Symptoms  Goal: Depressive Symptoms  Description: Signs and symptoms of listed problems will be absent or manageable.  Outcome: Progressing   Goal Outcome Evaluation:    Plan of Care Reviewed With: patient    Patient was visible on the unit most of the shift. Patient was occasionally hyper-verbal. Patient presented with a bright affect and somber mood. Patient watched TV with peers and attended therapy group. Patient denies pain. Patient denies anxiety, depression, SI, HI, auditory, and visual hallucinations.Contracts for safety. Patient requested multiple prn's; Nicorette gum, Throat lozenge, gabapentin, and Trazodone for insomnia. Staff will continue to monitor and follow care plan.Patient ate 100% dinner and hs snacks.Patient was med compliant denies adverse effects. Patient's routine COVID result was negative.

## 2023-04-05 NOTE — PLAN OF CARE
OCCUPATIONAL THERAPY GROUP NOTE:     04/04/23 1755   Group Therapy Session   Group Attendance attended group session   Time Session Began 1015   Time Session Ended 1145   Total Time (minutes) 90   Total # Attendees 6   Group Type task skill   Group Topic Covered structured socialization;leisure exploration/use of leisure time;problem-solving;coping skills/lifestyle management   Group Session Detail OT Clinic Group x2   Patient Response/Contribution cooperative with task;listened actively;organized;pleasant;engaged;congruent affect   Patient Participation Detail Occupational therapy clinic. Purpose of structured group: exploration/development of positive coping skills, engagement in creative expression and clinical observation of social, cognitive, and kinesthetic performance skills. Pt response: Pt actively participated in occupational therapy clinic. Chosen activity: small tristian dot projects. Independent to initiate, gather materials, sequence and adjust to workspace demands as needed. Demonstrated excellent focus (50 min without interruption, planning, and attention to detail for this moderately complex task. Able to ask for assistance and socialized with peers and staff. Shared that she has never done this type of art, but enjoys it and wants to get some to use outside the hospital. Affect appeared to brighten in interactions.

## 2023-04-06 PROCEDURE — 250N000013 HC RX MED GY IP 250 OP 250 PS 637: Performed by: PSYCHIATRY & NEUROLOGY

## 2023-04-06 PROCEDURE — H2032 ACTIVITY THERAPY, PER 15 MIN: HCPCS

## 2023-04-06 PROCEDURE — G0177 OPPS/PHP; TRAIN & EDUC SERV: HCPCS

## 2023-04-06 PROCEDURE — 124N000002 HC R&B MH UMMC

## 2023-04-06 PROCEDURE — 99232 SBSQ HOSP IP/OBS MODERATE 35: CPT | Performed by: PSYCHIATRY & NEUROLOGY

## 2023-04-06 RX ORDER — MENTHOL AND METHYL SALICYLATE 7.6; 29 G/100G; G/100G
OINTMENT TOPICAL EVERY 6 HOURS PRN
Status: DISCONTINUED | OUTPATIENT
Start: 2023-04-06 | End: 2023-04-06

## 2023-04-06 RX ADMIN — Medication 1 LOZENGE: at 08:54

## 2023-04-06 RX ADMIN — GABAPENTIN 300 MG: 300 CAPSULE ORAL at 21:42

## 2023-04-06 RX ADMIN — Medication 6000 UNITS: at 12:30

## 2023-04-06 RX ADMIN — BENZOYL PEROXIDE: 50 GEL TOPICAL at 21:34

## 2023-04-06 RX ADMIN — NICOTINE POLACRILEX 4 MG: 4 GUM, CHEWING BUCCAL at 14:02

## 2023-04-06 RX ADMIN — BUSPIRONE HYDROCHLORIDE 7.5 MG: 7.5 TABLET ORAL at 21:32

## 2023-04-06 RX ADMIN — WHITE PETROLATUM: 1.75 OINTMENT TOPICAL at 21:34

## 2023-04-06 RX ADMIN — OMEPRAZOLE 20 MG: 20 CAPSULE, DELAYED RELEASE ORAL at 08:50

## 2023-04-06 RX ADMIN — NICOTINE 1 PATCH: 21 PATCH, EXTENDED RELEASE TRANSDERMAL at 08:50

## 2023-04-06 RX ADMIN — TRAZODONE HYDROCHLORIDE 100 MG: 50 TABLET ORAL at 21:42

## 2023-04-06 RX ADMIN — FLUTICASONE PROPIONATE 1 SPRAY: 50 SPRAY, METERED NASAL at 08:51

## 2023-04-06 RX ADMIN — Medication 1 LOZENGE: at 19:07

## 2023-04-06 RX ADMIN — CETIRIZINE HYDROCHLORIDE 10 MG: 10 TABLET, FILM COATED ORAL at 08:50

## 2023-04-06 RX ADMIN — FLUTICASONE PROPIONATE 1 SPRAY: 50 SPRAY, METERED NASAL at 21:33

## 2023-04-06 RX ADMIN — Medication: at 17:20

## 2023-04-06 RX ADMIN — BUSPIRONE HYDROCHLORIDE 7.5 MG: 7.5 TABLET ORAL at 08:50

## 2023-04-06 RX ADMIN — NICOTINE POLACRILEX 4 MG: 4 GUM, CHEWING BUCCAL at 17:19

## 2023-04-06 RX ADMIN — BUSPIRONE HYDROCHLORIDE 7.5 MG: 7.5 TABLET ORAL at 14:02

## 2023-04-06 RX ADMIN — Medication 6000 UNITS: at 08:50

## 2023-04-06 RX ADMIN — NICOTINE POLACRILEX 4 MG: 4 GUM, CHEWING BUCCAL at 19:07

## 2023-04-06 RX ADMIN — QUETIAPINE FUMARATE 600 MG: 300 TABLET ORAL at 21:32

## 2023-04-06 RX ADMIN — PRENATAL VITAMINS-IRON FUMARATE 27 MG IRON-FOLIC ACID 0.8 MG TABLET 1 TABLET: at 08:50

## 2023-04-06 RX ADMIN — Medication 6000 UNITS: at 17:19

## 2023-04-06 RX ADMIN — NICOTINE POLACRILEX 4 MG: 4 GUM, CHEWING BUCCAL at 08:50

## 2023-04-06 RX ADMIN — ALBUTEROL SULFATE 2 PUFF: 90 AEROSOL, METERED RESPIRATORY (INHALATION) at 19:05

## 2023-04-06 ASSESSMENT — ACTIVITIES OF DAILY LIVING (ADL)
ADLS_ACUITY_SCORE: 28
DRESS: STREET CLOTHES;INDEPENDENT
ADLS_ACUITY_SCORE: 28
ADLS_ACUITY_SCORE: 28
LAUNDRY: WITH SUPERVISION
ADLS_ACUITY_SCORE: 28
HYGIENE/GROOMING: INDEPENDENT
ADLS_ACUITY_SCORE: 28
DRESS: STREET CLOTHES;INDEPENDENT
ORAL_HYGIENE: INDEPENDENT
ADLS_ACUITY_SCORE: 28
HYGIENE/GROOMING: INDEPENDENT
ORAL_HYGIENE: INDEPENDENT
ADLS_ACUITY_SCORE: 28
ADLS_ACUITY_SCORE: 28

## 2023-04-06 NOTE — PLAN OF CARE
Pt appears to have slept for 6.5 hours. Pt did not request for any prn medications this shift. Will continue to monitor.      Problem: Suicidal Behavior  Goal: Suicidal Behavior is Absent or Managed  Outcome: Progressing     Problem: Sleep Disturbance  Goal: Adequate Sleep/Rest  Outcome: Progressing   Goal Outcome Evaluation:

## 2023-04-06 NOTE — PLAN OF CARE
04/05/23 1911   Group Therapy Session   Group Attendance attended group session   Time Session Began 1605   Time Session Ended 1700   Total Time (minutes) 55   Total # Attendees 4   Group Type expressive therapy;psychoeducation   Patient Participation Detail Mental health management group focused on developing insight into supports, needs, and life struggles via a calming guided watercolor and safe containment. Pt Response: Pt presented with bright affect. She was engaged in the group brainstorm. Focus appeared good and less energetic than previous encounters. Pt demonstrated limited understanding of the activity - more concrete vs metaphorical. She did not identify past and current barriers and plans for the future.

## 2023-04-06 NOTE — PLAN OF CARE
OCCUPATIONAL THERAPY GROUP NOTE:     04/05/23 1611   Group Therapy Session   Group Attendance attended group session   Time Session Began 1100   Time Session Ended 1145   Total Time (minutes) 45   Total # Attendees 3   Group Type task skill   Group Topic Covered structured socialization;leisure exploration/use of leisure time;problem-solving;coping skills/lifestyle management   Group Session Detail OT Clinic Group   Patient Response/Contribution cooperative with task;listened actively;organized;pleasant;quietly engaged;congruent affect   Patient Participation Detail Occupational therapy clinic. Purpose of structured group: exploration/development of positive coping skills, engagement in creative expression and clinical observation of social, cognitive, and kinesthetic performance skills. Pt response: Pt actively participated in occupational therapy clinic. Chosen activity: small tristian dot projects. Independent to initiate, gather materials, sequence and adjust to workspace demands as needed. Demonstrated good focus (40 min without interruption), planning, and attention to detail for this moderately complex task. Able to ask for assistance and socialized with peers and staff as needed, however she listened to her headphones while she worked and generally kept to herself.  Affect appeared to brighten in interactions.

## 2023-04-06 NOTE — PLAN OF CARE
Assessment/Intervention/Current Symtoms and Care Coordination:  -Chart review  -Team meeting: Nurse reports Asha is doing well with new roommate. WR shared update that Asha was accepted to [facility].    DIONNE received notification from Aydin with CPA and CAMILO Christina that Asha was accepted to [facility] in [place] and is able to admit on Monday following her hearing. [facility] will set up transportation- Asha will receive psychiatric care at the facility. No follow up appointments are needed.    WR spoke with Asha and  [facility] by phone together. Asha was initially upset with plan for [facility] but after hearing that it would like be only until a group home can be found she was accepting.     WR updated [facility] choice , as well as IRTS that Asha was referred to.       Current Symptoms include the following: Psychosis, disorganization, patient is irritable  Precautions: Elopement and Cheeking     Discharge Plan or Goal:  Pending stabilization & development of a safe discharge plan.  Considerations include: IRTS with plan to pursue group home placement     Barriers to Discharge:  Asha presents with disorganized thought process and paranoia. She requires symptom stabilization, medication management, and supportive discharge plan.      Referral Status:  Referrals complete - Asha will discharge to [facility] on Monday 4/10.     Legal Status:  Patient is under an MICD Commitment in [facility]   Case No. [id number]     Contacts:  [facility]  - [facility] (phone: [phone], email: [email or web address])  Tyesha Early - commitment  with [employer] ([phone])     Upcoming Meetings/Important Dates:  Eloy Hearing Monday April 10th at 9:30 am     Rationale for SIO/No Roommate Order:  Patient is not on SIO.  Patient has current roommate.

## 2023-04-06 NOTE — PLAN OF CARE
Problem: Adult Behavioral Health Plan of Care  Goal: Optimized Coping Skills in Response to Life Stressors  Outcome: Progressing     Pt presented as alert and oriented to place and self throughout shift.  She was visible in the milieu during the day, social with select peers and attending OT groups.  Pt was dressed appropriately and showered in the afternoon.  She ate meals and was compliant with her medications.  She did not request or require any PRNs this shift aside from nicotine gum.  Pt denied anxiety and endorsed depression 2/10.  She did not endorse any symptoms of psychosis.  Pt denied any acute physical health concerns, pain, or side effects to medications.  She is also adjusting well to having a new roommate on this shift.

## 2023-04-06 NOTE — PLAN OF CARE
"   04/06/23 1433   Group Therapy Session   Group Attendance attended group session   Time Session Began 1300   Time Session Ended 1345   Total Time (minutes) 45   Total # Attendees 3   Group Type Occupational Therapy   Group Topic Covered balanced lifestyle;coping skills/lifestyle management;relaxation techniques;emotions/expression;self-care activities;structured socialization   Group Session Detail General Health and Coping Group   Patient Participation Detail Intervention: General Health and Coping Group with 2 peers.    Patient Response: Pt participated in Bingo activity with emphasis on healthy coping skills and different emotional regulation and self-care practices to support overall health and wellness through group discussion questions embedded within activity. Was an active participant in the group, offering responses to each of the coping skills questions without prompting. States one activity she would like to do in the future is to go deep sea fishing. When asked what is something that motivates you, patient responded \"changing laws and rules, things like that, that help other people\". When asked whether her children are motivating to her patient responded \"umm, sometimes but kids aren't everything which is why I said the laws part\".     Mood/Affect: Pleasant       Plan: Patient encouraged to maintain attendance for continued ongoing support in working towards occupational therapy goals to support overall treatment/care.          "

## 2023-04-06 NOTE — PLAN OF CARE
Problem: Psychotic Signs/Symptoms  Goal: Improved Behavioral Control (Psychotic Signs/Symptoms)  Outcome: Progressing  Goal: Optimal Cognitive Function (Psychotic Signs/Symptoms)  Intervention: Support and Promote Cognitive Ability  Recent Flowsheet Documentation  Taken 4/5/2023 1800 by Delphine Haynes RN  Trust Relationship/Rapport:    care explained    choices provided   Goal Outcome Evaluation:    Plan of Care Reviewed With: patient    Patient was visible in the milieu. Patient presented with elevated mood. Patient's affect was bright. Patient socialized with select peers. Patient attended therapy group. Patient did bible study with select peers. Patient watched TV with peers. Patient interacted appropriately. Patient's pulse was slightly elevated (108) although she denies anxiety but requests prn Gabapentin. Patient denies depression, SI, HI, auditory, and visual hallucinations. Contracts for safety.Patient was med compliant. Patient denies side effects. Other prn's that patient requested were; Nicorette gum, throat Lozenge and Trazodone. Staff will continue to monitor patient for insomnia and follow care plan.

## 2023-04-06 NOTE — PROGRESS NOTES
[facility], [place]   Psychiatric Progress Note  Hospital Day: 16        Interim History:   The patient's care was discussed with the treatment team during the daily team meeting and/or staff's chart notes were reviewed.  Staff report patient has been visible in the milieu, reporting some anxiety, received PRN gabapentin, taking medications as prescribed, attending some groups, slept 6.5 hours.     Upon interview, pt showed writer the project she was working on in OT group, was bright, proud of work. Agreed to meet in her room, reports she is aware there is a spot for her at [facility] for discharge on Monday, she is accepting of this and then plan to go to a . She is tolerating medications. She denies SI or HI, denies AVH. She feels comfortable with new roommate. She is having some neck/shoulder pain, agreeable to try soft care mattress and icy hot topical. No additional concerns.          Medications:       benzoyl peroxide   Topical At Bedtime     busPIRone  7.5 mg Oral TID     cetirizine  10 mg Oral Daily     fluticasone  1 spray Both Nostrils BID     lactase  6,000 Units Oral TID w/meals     mineral oil-hydrophilic petrolatum   Topical At Bedtime     nicotine  1 patch Transdermal Daily     nicotine   Transdermal Q8H     omeprazole  20 mg Oral Daily     prenatal multivitamin w/iron  1 tablet Oral Daily     QUEtiapine  600 mg Oral At Bedtime          Allergies:     Allergies   Allergen Reactions     Haloperidol Other (See Comments)     Shakes      Morphine Hives     Dust Mite Extract      Watery eyes and runny nose     Pollen Extract      Watery eyes and stuffy nose     Zyprexa [Olanzapine]      Skin rash, wants to talk to her MD about it          Labs:     Recent Results (from the past 48 hour(s))   Asymptomatic COVID-19 Virus (Coronavirus) by PCR Nasopharyngeal    Collection Time: 04/04/23  8:54 PM    Specimen: Nasopharyngeal; Swab   Result Value Ref Range    SARS CoV2 PCR Negative  Negative          Psychiatric Examination:     /80 (BP Location: Left arm)   Pulse 108   Temp 98.5  F (36.9  C) (Oral)   Resp 16   Ht 1.524 m (5')   Wt 88.5 kg (195 lb 3.2 oz)   SpO2 97%   BMI 38.12 kg/m    Weight is 195 lbs 3.2 oz  Body mass index is 38.12 kg/m .    Orthostatic Vitals       Most Recent      Sitting Orthostatic /88 04/06 0900    Sitting Orthostatic Pulse (bpm) 110 04/06 0900    Standing Orthostatic /78 04/06 0900    Standing Orthostatic Pulse (bpm) 115 04/06 0900        Appearance: awake, alert and adequately groomed  Attitude:  more cooperative  Eye Contact:  fair  Mood:  less labile  Affect:  mood congruent, less labile, brighter  Speech:  appropriate volume, normal prosody  Language: fluent and intact in English  Psychomotor, Gait, Musculoskeletal:  no evidence of tardive dyskinesia, dystonia, or tics  Thought Process:  tangential, continues to be illogical around treatment/medications at times   Associations:  no loose associations  Thought Content:  no evidence of suicidal ideation or homicidal ideation and denies AVH, appears paranoid/guarded at times, no paranoid statements today   Insight:  limited  Judgement:  limited  Oriented to:  time, person, and place  Attention Span and Concentration:  limited  Recent and Remote Memory:  limited  Fund of Knowledge:  appropriate    Clinical Global Impressions  First:  Considering your total clinical experience with this particular patient population, how severe are the patient's symptoms at this time?: 7 (03/21/23 1752)  Compared to the patient's condition at the START of treatment, this patient's condition is: 4 (03/21/23 1752)  Most recent:  Considering your total clinical experience with this particular patient population, how severe are the patient's symptoms at this time?: 6 (03/30/23 1247)  Compared to the patient's condition at the START of treatment, this patient's condition is: 3 (03/30/23 1247)         Precautions:  "    Behavioral Orders   Procedures     Cheeking Precautions (behavioral units)     Code 1 - Restrict to Unit     Elopement precautions     Routine Programming     As clinically indicated     Status 15     Every 15 minutes.          Diagnoses:      Schizoaffective disorder, depressed type vs bipolar type, with acute decompensation in psychosis symptoms and some mood instability (provisional)  Unspecified anxiety   Polysubstance use disorder including opioid and methamphetamine   Nicotine use disorder  R/O substance induced psychosis   Seasonal allergies and rhinitis   Asthma   Obesity   Type 2 DM poorly controlled          Assessment & Plan:   Assessment and hospital summary:  This patient is a 27 year old female under commitment in [facility] with history of psychosis and polysubstance use who presented to ED from [facility] due to concerns for worsening psychosis despite medication changes and inability to engage in program. She was discharged from , ED staff informed patients PD was revoked and she is now being admitted under committed status with plan to discharge to [facility] once stable per her [facility] CM. She was not cooperative with admission interview, majority of history obtained from chart review, will continue current medications as noted in chart, ordered admission labs, plan to coordinate with patients CM regarding disposition as patient stabilizes. Patient has continued to show limited insight into psychiatric symptoms and need for treatment, is illogical in requests around medications and willingness to take medications, fixated on the color of medications or concerned they are \"the right one\" which appears based in paranoia/delusions. Appears Lyons had , writer submitted request for a new Lyons 3/28/23.      Inpatient psychiatric hospitalization is warranted at this time for safety, stabilization, and possible adjustment in medications.    Psychiatric treatment/inteventions:  Medications: "   -continue PTA quetiapine (now in IR formulation) at 600mg at bedtime with additional IR 100mg BID PRN for agitation and psychosis (changed from XR due to patient declining to take XR medications due to pills being yellow in color)  -continue PTA buspirone 7.5mg TID for anxiety, plan to increase as indicated/toerlated     -continue PRN benzotropine 1mg BID for possible EPSE   -PRN hydroxyzine 25mg every 4 hour for anxiety  -continue PRN trazodone 100mg at bedtime for sleep   -PRN thorazine 10mg PO or 25mg IM with benadryl 50mg PO or IM TID for agitation/psychosis (pt has olanzapine listed as allergy)  -clonidine 0.1mg BID PRN for anxiety given some recent elevated HR and BPs, hold parameters in place     -also ordered PRN aquaphor for dry skin as PRN vanicream not available. Continue Lactaid for lactose intolerance, nicotine patch and prenatal vitamin per pts request  -PRN icy hot ordered for neck/shoulder pain, also ordered soft care mattress     The risks, benefits, alternatives and side effects have been discussed and are understood by the patient.     Laboratory/Imaging: no new labs ordered     Patient will be treated in therapeutic milieu with appropriate individual and group therapies as described.     Medical treatment/interventions:  Medical concerns: Pt denying acute medical concerns, will continue PTA flonase and zyrtec for allergies, PRN albuterol inhaler for asthma and nicotine gum for nicotine withdrawal. Elevated HgbA1c and abnormal lipid panel, IM consult placed 3/23, per note:   Asha Arrieta is a 27 year old female admitted on 3/15/2023. She has a past medical history of T2DM, schizoaffective disorder (depressed versus bipolar type), polysubstance use disorder (opioid, methamphetamine, tobacco), seasonal allergies, asthma who was admitted after presenting to the ED from [facility] for worsening psychosis.  Medicine consulted for diabetes management.     Type 2 Diabetes Mellitus,  "non-insulin-dependent, poorly controlled  A1c this morning 8.1%. Prior to this was 7.4% on 10/17/2022 in Care Everywhere. According to outside medication record, patient had been on Metformin 500 mg daily in the past but stated that she had side effects and would not elaborate further.  Limited data available for review, but glucose trends appear to be elevated above goal (190s-250s).  Patient unwilling to discuss diabetes with writer or \"anyone from this hospital\".  She vehemently denied having diabetes despite writer discussing that her A1c currently categorizes her as having diabetes, and became uncooperative.   - Discussed patient case with Dr. Donal Abrams. Should the patient be open to discussing diabetes with Internal Medicine, we are happy to come back and discuss management with the patient.  - Follow-up with PCP otherwise     Hyperlipidemia, mixed  Lipid panel drawn per psychiatry on admission showing low HDL, high LDL, high triglycerides.  - Follow-up with PCP within 1-2 weeks after discharge     Medicine will sign off. No further recommendations at this time.  Please feel free to reconsult if any new medical issues or concerns.         The patient's care was discussed with the Bedside Nurse and Patient, Primary Team (Dr. Katie Abrams).        Clinically Significant Risk Factors []Expand by Default                         # DMII: A1C = 8.1 % (Ref range: <5.7 %) within past 6 months, PRESENT ON ADMISSION  # Obesity: Estimated body mass index is 37.09 kg/m  as calculated from the following:    Height as of this encounter: 1.524 m (5').    Weight as of this encounter: 86.1 kg (189 lb 14.4 oz)., PRESENT ON ADMISSION             Toby Rendon PA-C  Hospitalist Service      Disposition Plan   Reason for ongoing admission: is unable to care for self due to severe psychosis or luli  Discharge location: [facility] on Monday see CTC notes   Discharge Medications: not ordered  Follow-up Appointments: not " scheduled  Legal Status: full commitment, appears Lyons , CTC looking into process for filing new Lyons with current commitment, new Lyons request sent to [facility] 3/28, requested thorazine, quetiapine, aripiprazole, risperidone     This document is created with the help of Dragon dictation system.  All grammatical/typing errors or context distortion are unintentional and inherent to software.    Patient has been seen and evaluated by me, Katie Abrams DO.

## 2023-04-06 NOTE — PLAN OF CARE
04/06/23 1212   Individualization/Patient Specific Goals   Patient Personal Strengths expressive of needs;medication/treatment adherence;resilient;community support   Patient Vulnerabilities family/relationship conflict;history of unsuccessful treatment;housing insecurity;lacks insight into illness;occupational insecurity;limited social skills;poor impulse control;substance abuse/addiction   Interprofessional Rounds   Participants nursing;CTC;psychiatrist   Behavioral Team Discussion   Participants Rosalind Chi RN, Nurys Hsu [facility]   Progress moderate   Anticipated length of stay 3 weeks   Continued Stay Criteria/Rationale Candace require safe transferr to step down level of care - will d/c next Monday to [facility] following her Lyons hearing.   Plan Will d/c to [facility] on Monday.   Rationale for change in precautions or plan Saskia symptoms have improved to point that she is appropriate for [facility].   Anticipated Discharge Disposition psychiatric hospital     PRECAUTIONS AND SAFETY    Behavioral Orders   Procedures    Cheeking Precautions (behavioral units)    Code 1 - Restrict to Unit    Elopement precautions    Routine Programming     As clinically indicated    Status 15     Every 15 minutes.       Safety  Safety WDL: WDL  Patient Location: [facility]  Observed Behavior: calm, sitting, walking  Observed Behavior (Comment): Socializing with roommate  Safety Measures: environmental rounds completed  Diversional Activity: music, television  Suicidality: Status 15, Minimal personal belongings in room  Assault: status 15  Elopement Assessment: Statements about wanting to leave  Elopement Interventions: status 15, signs posted on unit entrance / exit doors  Sexual: status 15

## 2023-04-06 NOTE — PLAN OF CARE
04/06/23 1234   Group Therapy Session   Group Attendance attended group session   Time Session Began 1015   Time Session Ended 1145   Total Time (minutes) 75   Total # Attendees 6   Group Type Occupational Therapy   Group Topic Covered balanced lifestyle;coping skills/lifestyle management;leisure exploration/use of leisure time;relaxation techniques   Group Session Detail OT Clinic Group   Patient Participation Detail Intervention: OT Clinic with 5 peers. Pt participated in a OT Clinic group to facilitate coping skills exploration and creative expression through personally meaningful activities, and to encourage utilization of these healthy coping skills to promote overall health and wellness. Group included clinical observation of social, cognitive and kinesthetic performance skills to inform treatment and safe discharge planning.    Patient Response: Joined clinic group with another peer who is new to the unit and took initiative to introduce this peer to a project they were working on. Explained to this peer the materials needed, the methods used to complete it and assisted this peer in setting up their project. Afterwards, patient initiated working on her own version of this project while working alongside this new peer. Was intermittently social with this peer regarding the project, other than this was listening to music on headphones for duration of time in group. Left group a little early to meet with provider, returning at the end to assist with cleanup.     Mood/Affect:  Pleasant      Plan: Patient encouraged to maintain attendance for continued ongoing support in working towards occupational therapy goals to support overall treatment/care.

## 2023-04-06 NOTE — PLAN OF CARE
OCCUPATIONAL THERAPY GROUP NOTE:     04/03/23 1626   Group Therapy Session   Group Attendance attended group session   Time Session Began 1015   Time Session Ended 1145   Total Time (minutes) 90   Total # Attendees 6   Group Type task skill   Group Topic Covered structured socialization;leisure exploration/use of leisure time;problem-solving;coping skills/lifestyle management   Group Session Detail OT Clinic Group x2   Patient Response/Contribution cooperative with task;organized;listened actively   Patient Participation Detail Occupational therapy clinic. Purpose of structured group: exploration/development of positive coping skills, engagement in creative expression and clinical observation of social, cognitive, and kinesthetic performance skills. Pt response: Pt actively participated in occupational therapy clinic. Chosen activity: completing a bandana stenciling/coloring project. Independent to initiate, gather materials, sequence and adjust to workspace demands as needed. Demonstrated excellent focus (60 min without interruption), planning, and attention to detail for this moderately complex task. Able to ask for assistance and socialized with peers and staff as needed. Future-oriented in conversation, and discussed her plans to try tristian dot during tomorrow's clinic group. Affect appeared to brighten in interactions.

## 2023-04-07 PROCEDURE — 124N000002 HC R&B MH UMMC

## 2023-04-07 PROCEDURE — 250N000013 HC RX MED GY IP 250 OP 250 PS 637: Performed by: PSYCHIATRY & NEUROLOGY

## 2023-04-07 PROCEDURE — G0177 OPPS/PHP; TRAIN & EDUC SERV: HCPCS

## 2023-04-07 PROCEDURE — 99232 SBSQ HOSP IP/OBS MODERATE 35: CPT | Performed by: PSYCHIATRY & NEUROLOGY

## 2023-04-07 RX ADMIN — GABAPENTIN 300 MG: 300 CAPSULE ORAL at 17:06

## 2023-04-07 RX ADMIN — GABAPENTIN 300 MG: 300 CAPSULE ORAL at 23:03

## 2023-04-07 RX ADMIN — Medication 6000 UNITS: at 11:48

## 2023-04-07 RX ADMIN — Medication: at 10:33

## 2023-04-07 RX ADMIN — BUSPIRONE HYDROCHLORIDE 7.5 MG: 7.5 TABLET ORAL at 19:17

## 2023-04-07 RX ADMIN — BUSPIRONE HYDROCHLORIDE 7.5 MG: 7.5 TABLET ORAL at 13:14

## 2023-04-07 RX ADMIN — BUSPIRONE HYDROCHLORIDE 7.5 MG: 7.5 TABLET ORAL at 08:30

## 2023-04-07 RX ADMIN — BENZOYL PEROXIDE: 50 GEL TOPICAL at 20:46

## 2023-04-07 RX ADMIN — FLUTICASONE PROPIONATE 1 SPRAY: 50 SPRAY, METERED NASAL at 08:30

## 2023-04-07 RX ADMIN — FLUTICASONE PROPIONATE 1 SPRAY: 50 SPRAY, METERED NASAL at 19:17

## 2023-04-07 RX ADMIN — QUETIAPINE FUMARATE 600 MG: 300 TABLET ORAL at 20:45

## 2023-04-07 RX ADMIN — NICOTINE POLACRILEX 4 MG: 4 GUM, CHEWING BUCCAL at 18:44

## 2023-04-07 RX ADMIN — CETIRIZINE HYDROCHLORIDE 10 MG: 10 TABLET, FILM COATED ORAL at 08:30

## 2023-04-07 RX ADMIN — WHITE PETROLATUM: 1.75 OINTMENT TOPICAL at 20:46

## 2023-04-07 RX ADMIN — Medication 6000 UNITS: at 08:30

## 2023-04-07 RX ADMIN — TRAZODONE HYDROCHLORIDE 100 MG: 50 TABLET ORAL at 20:46

## 2023-04-07 RX ADMIN — NICOTINE POLACRILEX 4 MG: 4 GUM, CHEWING BUCCAL at 17:06

## 2023-04-07 RX ADMIN — Medication 6000 UNITS: at 17:06

## 2023-04-07 RX ADMIN — OMEPRAZOLE 20 MG: 20 CAPSULE, DELAYED RELEASE ORAL at 08:30

## 2023-04-07 RX ADMIN — NICOTINE POLACRILEX 4 MG: 4 GUM, CHEWING BUCCAL at 20:48

## 2023-04-07 RX ADMIN — PRENATAL VITAMINS-IRON FUMARATE 27 MG IRON-FOLIC ACID 0.8 MG TABLET 1 TABLET: at 08:30

## 2023-04-07 RX ADMIN — NICOTINE 1 PATCH: 21 PATCH, EXTENDED RELEASE TRANSDERMAL at 08:31

## 2023-04-07 ASSESSMENT — ACTIVITIES OF DAILY LIVING (ADL)
ADLS_ACUITY_SCORE: 28

## 2023-04-07 NOTE — PLAN OF CARE
Goal Outcome Evaluation:    Plan of Care Reviewed With: patient      Problem: Adult Behavioral Health Plan of Care  Goal: Plan of Care Review  Outcome: Progressing  Flowsheets (Taken 4/6/2023 4106)  Patient Agreement with Plan of Care: agrees   Pt presented with a bright affect, calm mood. Pt endorsed back/shoulder pain 4/10, PRN Bengay was effective. Denied all metal health symptoms and contracted for safety. Observed reading in her room and in the lounge, also watching TV with peers, social with select peers. Getting a long with roommate. Pt noted listening to music via headphones. Compliant with all scheduled medications, no side effects observed or reported. Pt requested & received PRN Nicotine x 2 , albuterol & cough lozenge, all helpful. Pt also received PRN Trazodone &Gabapentin with HS medications per request. Pt was cooperative and pleasant through out the shift, no safety concerns observed or reported.

## 2023-04-07 NOTE — PROGRESS NOTES
04/06/23 1900   Group Therapy Session   Time Session Began 1600   Time Session Ended 1645   Total Time (minutes) 30   Total # Attendees 5   Group Type expressive therapy   Group Topic Covered relaxation techniques;leisure exploration/use of leisure time;self-care activities   Group Session Detail Relaxation   Patient Response/Contribution cooperative with task   Patient Participation Detail Cooperatively engaged in Evening Music Relaxation group to decrease anxiety and promote well-being.   Asha exhibited an affect on the flat side, and did not brighten much upon approach.  She did participate calmly, needing no redirections.

## 2023-04-07 NOTE — PLAN OF CARE
Goal Outcome Evaluation:        Patient was in bed at the beginning of the shift, breathing quietly and unlabored.   Pt slept 6.25 hrs  No concerns reported or noted this shift.  Will continue to Monitor.  PRNs:None

## 2023-04-07 NOTE — PLAN OF CARE
Goal Outcome Evaluation:    Plan of Care Reviewed With: patient    Problem: Psychotic Signs/Symptoms  Goal: Improved Behavioral Control (Psychotic Signs/Symptoms)  Outcome: Progressing  Flowsheets (Taken 4/7/2023 1402)  Mutually Determined Action Steps (Improved Behavioral Control): verbalizes gratifying activity     Problem: Anxiety  Goal: Anxiety Reduction or Resolution  Outcome: Progressing     Problem: Suicidal Behavior  Goal: Suicidal Behavior is Absent or Managed  Outcome: Progressing  Flowsheets (Taken 4/7/2023 1402)  Mutually Determined Action Steps (Suicidal Behavior Absent/Managed): shares suicidal thoughts  Individualized Action Step (Suicidal Behavior Absent/Managed): denies suicidal thoughts  Patient presents with a bright and full range affect today, calm mood and pleasant. Patient is attending groups on the unit, interacts and socialize with peers. Patient requested and receive Bengay for her back pain which was effective.   Pt denies SI/SIB/AVH, denies depression and anxiety.   Patient's court hearing is on Monday 04/10/2023. Patient will probably discharge after the court hearing.     -NEW ORDERS: None  -SLEEP: Good  -Appetite: Good  -PRN: Bengay for back pain

## 2023-04-07 NOTE — PLAN OF CARE
04/07/23 1820   Group Therapy Session   Group Attendance attended group session   Time Session Began 1600   Time Session Ended 1655   Total Time (minutes) 55   Total # Attendees 5   Group Type psychotherapeutic   Group Topic Covered coping skills/lifestyle management;emotions/expression   Group Session Detail CTC-[facility] Discussion Questions-Group members discussed questions about their values, how they have changed, future values, and how they differ from others   Patient Response/Contribution cooperative with task;listened actively;organized;verbalizations were off topic   Patient Participation Detail Patient presented to group was talkative, engaged, and checked in feeling good

## 2023-04-07 NOTE — PROGRESS NOTES
"[facility], [place]   Psychiatric Progress Note  Hospital Day: 17        Interim History:   The patient's care was discussed with the treatment team during the daily team meeting and/or staff's chart notes were reviewed.  Staff report patient has been visible in the milieu, attending groups, taking medications as prescribed, receiving some PRNs for nicotine and cough, getting along with roommate, slept 6.25 hours.     Upon interview, patient reports that she is doing \"fine\", tolerating medications, denies having any SI or HI, denies AVH. Again discussed willingness to start medications for blood sugar, patient continues to decline \"no I\"m good\", even with education. Denies having any other concerns she wishes to discuss, waiting to discharge to [facility]. Has been going well with current roommate.          Medications:       benzoyl peroxide   Topical At Bedtime     busPIRone  7.5 mg Oral TID     cetirizine  10 mg Oral Daily     fluticasone  1 spray Both Nostrils BID     lactase  6,000 Units Oral TID w/meals     mineral oil-hydrophilic petrolatum   Topical At Bedtime     nicotine  1 patch Transdermal Daily     nicotine   Transdermal Q8H     omeprazole  20 mg Oral Daily     prenatal multivitamin w/iron  1 tablet Oral Daily     QUEtiapine  600 mg Oral At Bedtime          Allergies:     Allergies   Allergen Reactions     Haloperidol Other (See Comments)     Shakes      Morphine Hives     Dust Mite Extract      Watery eyes and runny nose     Pollen Extract      Watery eyes and stuffy nose     Zyprexa [Olanzapine]      Skin rash, wants to talk to her MD about it          Labs:     No results found for this or any previous visit (from the past 48 hour(s)).       Psychiatric Examination:     /88   Pulse 113   Temp 99.1  F (37.3  C) (Oral)   Resp 18   Ht 1.524 m (5')   Wt 88.5 kg (195 lb 3.2 oz)   SpO2 99%   BMI 38.12 kg/m    Weight is 195 lbs 3.2 oz  Body mass index is 38.12 " "kg/m .    Orthostatic Vitals       Most Recent      Sitting Orthostatic /88 04/06 0900    Sitting Orthostatic Pulse (bpm) 110 04/06 0900    Standing Orthostatic /99 04/07 1023    Standing Orthostatic Pulse (bpm) 106 04/07 1023        Appearance: awake, alert and adequately groomed  Attitude:  somewhat cooperative  Eye Contact:  fair  Mood:  less labile \"fine\"  Affect:  mood congruent  Speech:  appropriate volume, normal prosody  Language: fluent and intact in English  Psychomotor, Gait, Musculoskeletal:  no evidence of tardive dyskinesia, dystonia, or tics  Thought Process:  tangential, continues to be illogical around treatment/medications at times   Associations:  no loose associations  Thought Content:  no evidence of suicidal ideation or homicidal ideation and denies AVH, appears paranoid/guarded at times, no paranoid statements today   Insight:  limited  Judgement:  limited  Oriented to:  time, person, and place  Attention Span and Concentration:  limited  Recent and Remote Memory:  limited  Fund of Knowledge:  appropriate    Clinical Global Impressions  First:  Considering your total clinical experience with this particular patient population, how severe are the patient's symptoms at this time?: 7 (03/21/23 1752)  Compared to the patient's condition at the START of treatment, this patient's condition is: 4 (03/21/23 1752)  Most recent:  Considering your total clinical experience with this particular patient population, how severe are the patient's symptoms at this time?: 6 (04/07/23 1447)  Compared to the patient's condition at the START of treatment, this patient's condition is: 3 (04/07/23 1447)         Precautions:     Behavioral Orders   Procedures     Cheeking Precautions (behavioral units)     Code 1 - Restrict to Unit     Elopement precautions     Routine Programming     As clinically indicated     Status 15     Every 15 minutes.          Diagnoses:      Schizoaffective disorder, depressed " "type vs bipolar type, with acute decompensation in psychosis symptoms and some mood instability (provisional)  Unspecified anxiety   Polysubstance use disorder including opioid and methamphetamine   Nicotine use disorder  R/O substance induced psychosis   Seasonal allergies and rhinitis   Asthma   Obesity   Type 2 DM poorly controlled          Assessment & Plan:   Assessment and hospital summary:  This patient is a 27 year old female under commitment in [facility] with history of psychosis and polysubstance use who presented to ED from [facility] due to concerns for worsening psychosis despite medication changes and inability to engage in program. She was discharged from , ED staff informed patients PD was revoked and she is now being admitted under committed status with plan to discharge to [facility] once stable per her [facility] CM. She was not cooperative with admission interview, majority of history obtained from chart review, will continue current medications as noted in chart, ordered admission labs, plan to coordinate with patients CM regarding disposition as patient stabilizes. Patient has continued to show limited insight into psychiatric symptoms and need for treatment, is illogical in requests around medications and willingness to take medications, fixated on the color of medications or concerned they are \"the right one\" which appears based in paranoia/delusions. Appears Lyons had , writer submitted request for a new Lyons 3/28/23.      Inpatient psychiatric hospitalization is warranted at this time for safety, stabilization, and possible adjustment in medications.    Psychiatric treatment/inteventions:  Medications:   -continue PTA quetiapine (now in IR formulation) at 600mg at bedtime with additional IR 100mg BID PRN for agitation and psychosis (changed from XR due to patient declining to take XR medications due to pills being yellow in color)  -continue PTA buspirone 7.5mg TID for anxiety, " plan to increase as indicated/toerlated     -continue PRN benzotropine 1mg BID for possible EPSE   -PRN hydroxyzine 25mg every 4 hour for anxiety  -continue PRN trazodone 100mg at bedtime for sleep   -PRN thorazine 10mg PO or 25mg IM with benadryl 50mg PO or IM TID for agitation/psychosis (pt has olanzapine listed as allergy)  -clonidine 0.1mg BID PRN for anxiety given some recent elevated HR and BPs, hold parameters in place     -also ordered PRN aquaphor for dry skin as PRN vanicream not available. Continue Lactaid for lactose intolerance, nicotine patch and prenatal vitamin per pts request  -PRN icy hot ordered for neck/shoulder pain, also ordered soft care mattress     The risks, benefits, alternatives and side effects have been discussed and are understood by the patient.     Laboratory/Imaging: no new labs ordered     Patient will be treated in therapeutic milieu with appropriate individual and group therapies as described.     Medical treatment/interventions:  Medical concerns: Pt denying acute medical concerns, will continue PTA flonase and zyrtec for allergies, PRN albuterol inhaler for asthma and nicotine gum for nicotine withdrawal. Elevated HgbA1c and abnormal lipid panel, IM consult placed 3/23, per note:   Asha Arrieta is a 27 year old female admitted on 3/15/2023. She has a past medical history of T2DM, schizoaffective disorder (depressed versus bipolar type), polysubstance use disorder (opioid, methamphetamine, tobacco), seasonal allergies, asthma who was admitted after presenting to the ED from [facility] for worsening psychosis.  Medicine consulted for diabetes management.     Type 2 Diabetes Mellitus, non-insulin-dependent, poorly controlled  A1c this morning 8.1%. Prior to this was 7.4% on 10/17/2022 in Care Everywhere. According to outside medication record, patient had been on Metformin 500 mg daily in the past but stated that she had side effects and would not elaborate further.  Limited  "data available for review, but glucose trends appear to be elevated above goal (190s-250s).  Patient unwilling to discuss diabetes with writer or \"anyone from this hospital\".  She vehemently denied having diabetes despite writer discussing that her A1c currently categorizes her as having diabetes, and became uncooperative.   - Discussed patient case with Dr. Donal Abrams. Should the patient be open to discussing diabetes with Internal Medicine, we are happy to come back and discuss management with the patient.  - Follow-up with PCP otherwise     Hyperlipidemia, mixed  Lipid panel drawn per psychiatry on admission showing low HDL, high LDL, high triglycerides.  - Follow-up with PCP within 1-2 weeks after discharge     Medicine will sign off. No further recommendations at this time.  Please feel free to reconsult if any new medical issues or concerns.         The patient's care was discussed with the Bedside Nurse and Patient, Primary Team (Dr. Katie Abrams).        Clinically Significant Risk Factors []Expand by Default                         # DMII: A1C = 8.1 % (Ref range: <5.7 %) within past 6 months, PRESENT ON ADMISSION  # Obesity: Estimated body mass index is 37.09 kg/m  as calculated from the following:    Height as of this encounter: 1.524 m (5').    Weight as of this encounter: 86.1 kg (189 lb 14.4 oz)., PRESENT ON ADMISSION             Toby Rendon PA-C  Hospitalist Service      Disposition Plan   Reason for ongoing admission: is unable to care for self due to severe psychosis or luli  Discharge location: [facility] on Tuesday see CTC notes   Discharge Medications: not ordered  Follow-up Appointments: not scheduled  Legal Status: full commitment, appears Lyons , CTC looking into process for filing new Lyons with current commitment, new Lyons request sent to [facility] 3/28, requested thorazine, quetiapine, aripiprazole, risperidone     This document is created with the help of Johnny" dictation system.  All grammatical/typing errors or context distortion are unintentional and inherent to software.    Patient has been seen and evaluated by me, Katie Abrams DO.

## 2023-04-07 NOTE — PLAN OF CARE
OCCUPATIONAL THERAPY GROUP NOTE:     04/07/23 1529   Group Therapy Session   Group Attendance attended group session   Time Session Began 1015   Time Session Ended 1115   Total Time (minutes) 60   Total # Attendees 2   Group Type task skill   Group Topic Covered structured socialization;leisure exploration/use of leisure time;problem-solving;coping skills/lifestyle management   Group Session Detail OT Clinic Group   Patient Response/Contribution cooperative with task;listened actively;organized;pleasant;engaged;congruent affect   Patient Participation Detail Occupational therapy clinic. Purpose of structured group: exploration/development of positive coping skills, engagement in creative expression and clinical observation of social, cognitive, and kinesthetic performance skills. Pt response: Pt actively participated in occupational therapy clinic. Chosen activity: small tristian dot projects. Independent to initiate, gather materials, sequence and adjust to workspace demands as needed. Demonstrated excellent focus (55 min without interruption), planning, and attention to detail for this moderately complex task. Able to ask for assistance and socialized with peers and staff as needed, however she generally kept to herself and listened to her headphones. Affect appeared to brighten in interactions.

## 2023-04-07 NOTE — PLAN OF CARE
Assessment/Intervention/Current Symtoms and Care Coordination:  -Chart review  -Team meeting: nurse reports that Asha was bright, calm this morning, complained of back/shoulder pain utilizing PRN's. She is taking her medications and is cooperative with cares.      WR received email from  [facility] stating the [facility] is not able to take Asha until Tuesday. They require that Asha have secure transport, CM [facility] set up ride through [facility], they will come to  Asha at 9 am on Tuesday April 11th. WR inquired about where medications should be sent/or filled here, [facility] said he would get back to WR when he got answer. WR met with Asha and let her know change in plans, Asha was accepting.    Current Symptoms include the following: Psychosis, disorganization, patient is irritable  Precautions: Elopement and Cheeking     Discharge Plan or Goal:  Pending stabilization & development of a safe discharge plan.  Considerations include: IRTS with plan to pursue group home placement     Barriers to Discharge:  Asha presents with disorganized thought process and paranoia. She requires symptom stabilization, medication management, and supportive discharge plan.      Referral Status:  Referrals complete - Asha will discharge to [facility] on Tuesday 4/11..     Legal Status:  Patient is under an MICD Commitment in [facility]   Case No. [id number]     Contacts:  [facility]  - [facility] (phone: [phone], email: [email or web address])  Tyesha Early - commitment  with [facility] ([phone])     Upcoming Meetings/Important Dates:  [facility] Hearing Monday April 10th at 9:30 am     Rationale for SIO/No Roommate Order:  Patient is not on SIO.  Patient has current roommate.

## 2023-04-08 PROCEDURE — 250N000013 HC RX MED GY IP 250 OP 250 PS 637: Performed by: PSYCHIATRY & NEUROLOGY

## 2023-04-08 PROCEDURE — H2032 ACTIVITY THERAPY, PER 15 MIN: HCPCS

## 2023-04-08 PROCEDURE — 124N000002 HC R&B MH UMMC

## 2023-04-08 RX ADMIN — FLUTICASONE PROPIONATE 1 SPRAY: 50 SPRAY, METERED NASAL at 20:17

## 2023-04-08 RX ADMIN — BENZOYL PEROXIDE: 50 GEL TOPICAL at 20:17

## 2023-04-08 RX ADMIN — CETIRIZINE HYDROCHLORIDE 10 MG: 10 TABLET, FILM COATED ORAL at 08:23

## 2023-04-08 RX ADMIN — FLUTICASONE PROPIONATE 1 SPRAY: 50 SPRAY, METERED NASAL at 08:23

## 2023-04-08 RX ADMIN — NICOTINE POLACRILEX 2 MG: 4 GUM, CHEWING BUCCAL at 12:27

## 2023-04-08 RX ADMIN — IBUPROFEN 600 MG: 600 TABLET ORAL at 20:23

## 2023-04-08 RX ADMIN — PRENATAL VITAMINS-IRON FUMARATE 27 MG IRON-FOLIC ACID 0.8 MG TABLET 1 TABLET: at 08:23

## 2023-04-08 RX ADMIN — BUSPIRONE HYDROCHLORIDE 7.5 MG: 7.5 TABLET ORAL at 20:17

## 2023-04-08 RX ADMIN — IBUPROFEN 600 MG: 600 TABLET ORAL at 01:08

## 2023-04-08 RX ADMIN — BUSPIRONE HYDROCHLORIDE 7.5 MG: 7.5 TABLET ORAL at 13:50

## 2023-04-08 RX ADMIN — OMEPRAZOLE 20 MG: 20 CAPSULE, DELAYED RELEASE ORAL at 08:23

## 2023-04-08 RX ADMIN — Medication 6000 UNITS: at 11:02

## 2023-04-08 RX ADMIN — Medication 6000 UNITS: at 08:23

## 2023-04-08 RX ADMIN — WHITE PETROLATUM: 1.75 OINTMENT TOPICAL at 20:17

## 2023-04-08 RX ADMIN — QUETIAPINE FUMARATE 600 MG: 300 TABLET ORAL at 20:17

## 2023-04-08 RX ADMIN — NICOTINE 1 PATCH: 21 PATCH, EXTENDED RELEASE TRANSDERMAL at 08:23

## 2023-04-08 RX ADMIN — BUSPIRONE HYDROCHLORIDE 7.5 MG: 7.5 TABLET ORAL at 08:23

## 2023-04-08 RX ADMIN — TRAZODONE HYDROCHLORIDE 100 MG: 50 TABLET ORAL at 20:23

## 2023-04-08 RX ADMIN — Medication 6000 UNITS: at 20:17

## 2023-04-08 RX ADMIN — GABAPENTIN 300 MG: 300 CAPSULE ORAL at 20:23

## 2023-04-08 ASSESSMENT — ACTIVITIES OF DAILY LIVING (ADL)
ADLS_ACUITY_SCORE: 28

## 2023-04-08 NOTE — PLAN OF CARE
Goal Outcome Evaluation:  Plan of Care Reviewed With: patient       Problem: Psychotic Signs/Symptoms  Goal: Improved Behavioral Control (Psychotic Signs/Symptoms)  4/7/2023 2132 by Shamar Stewart RN  Outcome: Progressing  Flowsheets (Taken 4/7/2023 2132)  Mutually Determined Action Steps (Improved Behavioral Control):    identifies symptoms triggers    verbalizes gratifying activity     Problem: Suicidal Behavior  Goal: Suicidal Behavior is Absent or Managed  4/7/2023 2132 by Shamar Stewart RN  Outcome: Progressing  Flowsheets (Taken 4/7/2023 2132)  Mutually Determined Action Steps (Suicidal Behavior Absent/Managed): shares suicidal thoughts  Individualized Action Step (Suicidal Behavior Absent/Managed): denies suicidal thoughts      Patient affect is flat at times but bright and full range majority of the times. Patient has a calm mood,  pleasant and cooperative. Patient is attending and participating in groups on the unit. Patient interacts and socialize with peers. Patient is compliant with medications.  Pt denies SI/SIB/AVH, denies depression and endorse anxiety 6/10. Patient requested and received Gabapentin 300 mg PRN X2 and Trazodone 100 mg at bed time.   Patient took a shower this evening.   Patient's court hearing is on Monday 04/10/2023. Patient will probably discharge after the court hearing.   No behavioral concerns this shift.     -NEW ORDERS: None  -SLEEP: Good  -Appetite: Good  -PRN: Gabapentin 300 mg X2 for anxiety, Trazodone 100 mg, nicotine gum

## 2023-04-08 NOTE — PLAN OF CARE
Problem: Sleep Disturbance  Goal: Adequate Sleep/Rest  Outcome: Progressing     Pt woke up around 0100 and requested for PRN Ibuprofen for her headache rated 4/10. Pt received PRN Ibuprofen at 0108. Pt appeared to slept 6.5 hours based on Q 15 mins round. Breathing even and unlabored.

## 2023-04-09 PROCEDURE — 250N000013 HC RX MED GY IP 250 OP 250 PS 637: Performed by: PSYCHIATRY & NEUROLOGY

## 2023-04-09 PROCEDURE — 250N000011 HC RX IP 250 OP 636: Performed by: PSYCHIATRY & NEUROLOGY

## 2023-04-09 PROCEDURE — 124N000002 HC R&B MH UMMC

## 2023-04-09 RX ADMIN — CETIRIZINE HYDROCHLORIDE 10 MG: 10 TABLET, FILM COATED ORAL at 08:20

## 2023-04-09 RX ADMIN — NICOTINE POLACRILEX 4 MG: 4 GUM, CHEWING BUCCAL at 18:46

## 2023-04-09 RX ADMIN — NICOTINE 1 PATCH: 21 PATCH, EXTENDED RELEASE TRANSDERMAL at 08:20

## 2023-04-09 RX ADMIN — Medication 6000 UNITS: at 10:18

## 2023-04-09 RX ADMIN — FLUTICASONE PROPIONATE 1 SPRAY: 50 SPRAY, METERED NASAL at 08:20

## 2023-04-09 RX ADMIN — ONDANSETRON 4 MG: 4 TABLET, ORALLY DISINTEGRATING ORAL at 10:00

## 2023-04-09 RX ADMIN — NICOTINE POLACRILEX 4 MG: 2 LOZENGE ORAL at 12:55

## 2023-04-09 RX ADMIN — NICOTINE POLACRILEX 4 MG: 4 GUM, CHEWING BUCCAL at 21:44

## 2023-04-09 RX ADMIN — BENZOYL PEROXIDE: 50 GEL TOPICAL at 20:33

## 2023-04-09 RX ADMIN — QUETIAPINE FUMARATE 600 MG: 300 TABLET ORAL at 20:33

## 2023-04-09 RX ADMIN — BUSPIRONE HYDROCHLORIDE 7.5 MG: 7.5 TABLET ORAL at 13:00

## 2023-04-09 RX ADMIN — GABAPENTIN 300 MG: 300 CAPSULE ORAL at 21:45

## 2023-04-09 RX ADMIN — WHITE PETROLATUM: 1.75 OINTMENT TOPICAL at 20:33

## 2023-04-09 RX ADMIN — ALBUTEROL SULFATE 2 PUFF: 90 AEROSOL, METERED RESPIRATORY (INHALATION) at 09:58

## 2023-04-09 RX ADMIN — TRAZODONE HYDROCHLORIDE 100 MG: 50 TABLET ORAL at 21:45

## 2023-04-09 RX ADMIN — BUSPIRONE HYDROCHLORIDE 7.5 MG: 7.5 TABLET ORAL at 20:33

## 2023-04-09 RX ADMIN — Medication 6000 UNITS: at 17:37

## 2023-04-09 RX ADMIN — OMEPRAZOLE 20 MG: 20 CAPSULE, DELAYED RELEASE ORAL at 08:20

## 2023-04-09 RX ADMIN — PRENATAL VITAMINS-IRON FUMARATE 27 MG IRON-FOLIC ACID 0.8 MG TABLET 1 TABLET: at 08:20

## 2023-04-09 RX ADMIN — FLUTICASONE PROPIONATE 1 SPRAY: 50 SPRAY, METERED NASAL at 20:33

## 2023-04-09 RX ADMIN — Medication 6000 UNITS: at 13:00

## 2023-04-09 RX ADMIN — NICOTINE POLACRILEX 4 MG: 4 GUM, CHEWING BUCCAL at 10:00

## 2023-04-09 RX ADMIN — BUSPIRONE HYDROCHLORIDE 7.5 MG: 7.5 TABLET ORAL at 08:20

## 2023-04-09 ASSESSMENT — ACTIVITIES OF DAILY LIVING (ADL)
ADLS_ACUITY_SCORE: 29
ADLS_ACUITY_SCORE: 28
ADLS_ACUITY_SCORE: 29
ADLS_ACUITY_SCORE: 28
ADLS_ACUITY_SCORE: 29
ADLS_ACUITY_SCORE: 28
HYGIENE/GROOMING: INDEPENDENT
DRESS: STREET CLOTHES;INDEPENDENT
ADLS_ACUITY_SCORE: 28
ADLS_ACUITY_SCORE: 28
ORAL_HYGIENE: INDEPENDENT
ADLS_ACUITY_SCORE: 28

## 2023-04-09 NOTE — PLAN OF CARE
Goal Outcome Evaluation:    Plan of Care Reviewed With: patient    Problem: Psychotic Signs/Symptoms  Goal: Improved Behavioral Control (Psychotic Signs/Symptoms)  Outcome: Progressing  Flowsheets (Taken 4/9/2023 1358)  Mutually Determined Action Steps (Improved Behavioral Control): verbalizes gratifying activity     Problem: Anxiety  Goal: Anxiety Reduction or Resolution  Outcome: Progressing     Problem: Suicidal Behavior  Goal: Suicidal Behavior is Absent or Managed  Outcome: Progressing  Flowsheets (Taken 4/9/2023 1351)  Mutually Determined Action Steps (Suicidal Behavior Absent/Managed): shares suicidal thoughts    Patient present with a bright affect, mood is calm and elated at times. Patient is pleasant, cooperative and compliant with medications. Patient interacts and socialize with selective peers.  Patient ate 100% on both breakfast and lunch  Pt denies SI/SIB/AVH, denies depression and anxiety this shift.   Patient requested and received abuterol inhaler prn, Zofran 4 mg for nausea which were effective to manage her symptoms.   Patient's court hearing is on tomorrow Monday 04/10/2023. Patient will probably discharge after the court hearing.   No behavioral concerns this shift.     -NEW ORDERS: None  -MED Side effect: Denies  -SLEEP: Good  -Appetite: Good  -PRN: abuterol inhaler prn, Zofran 4 mg for nausea,

## 2023-04-09 NOTE — PROGRESS NOTES
04/08/23 1900   Group Therapy Session   Group Attendance attended group session   Time Session Began 1600   Time Session Ended 1655   Total Time (minutes) 45   Total # Attendees 5   Group Type recreation   Group Topic Covered leisure exploration/use of leisure time   Group Session Detail TR leisure group   Patient Response/Contribution cooperative with task   Patient Participation Detail Pt participated in a structured Therapeutic Recreation group with a focus on leisure participation, stress reduction, and social engagement via an active group game. Pt remained focused and engaged throughout full duration of group.  Pt was a quiet participant, minimally interacting with others. Pt had her headphones on throughout the group.

## 2023-04-09 NOTE — PLAN OF CARE
Pt appeared to sleep soundly through the night. No PRN medications were administered, and no concerns were noted.     Problem: Sleep Disturbance  Goal: Adequate Sleep/Rest  Outcome: Progressing

## 2023-04-09 NOTE — PLAN OF CARE
Goal Outcome Evaluation:    Plan of Care Reviewed With: patient    Problem: Psychotic Signs/Symptoms  Goal: Improved Behavioral Control (Psychotic Signs/Symptoms)  Outcome: Progressing  Flowsheets (Taken 4/8/2023 2147)  Mutually Determined Action Steps (Improved Behavioral Control): verbalizes gratifying activity     Problem: Anxiety  Goal: Anxiety Reduction or Resolution  Outcome: Progressing     Problem: Suicidal Behavior  Goal: Suicidal Behavior is Absent or Managed  Outcome: Progressing  Flowsheets (Taken 4/8/2023 2147)  Mutually Determined Action Steps (Suicidal Behavior Absent/Managed): (Denies suicidal thoughts) shares suicidal thoughts    Patient has been out in the milieu most part of the morning and evening shift watching TV in the lounge. Patient present with a calm mood bright affect,  pleasant, cooperative and compliant with medications. Patient continue to attend and participate in groups on the unit. Patient interacts and socialize with selective peers.  Pt denies SI/SIB/AVH, denies depression and anxiety.  Patient took a shower this evening.   Patient requested and received Gabapentin 300 mg PRN and Trazodone 100 mg at bed time.   Patient's court hearing is on Monday 04/10/2023. Patient will probably discharge after the court hearing.   No behavioral concerns this shift.     -NEW ORDERS: None  -SLEEP: Good  -Appetite: Good  -PRN: Gabapentin 300 mg for anxiety, Trazodone 100 mg, nicotine gum

## 2023-04-10 PROCEDURE — G0177 OPPS/PHP; TRAIN & EDUC SERV: HCPCS

## 2023-04-10 PROCEDURE — 250N000013 HC RX MED GY IP 250 OP 250 PS 637: Performed by: PSYCHIATRY & NEUROLOGY

## 2023-04-10 PROCEDURE — 99233 SBSQ HOSP IP/OBS HIGH 50: CPT | Performed by: PSYCHIATRY & NEUROLOGY

## 2023-04-10 PROCEDURE — 124N000002 HC R&B MH UMMC

## 2023-04-10 RX ORDER — ONDANSETRON 4 MG/1
4 TABLET, ORALLY DISINTEGRATING ORAL EVERY 6 HOURS PRN
COMMUNITY
Start: 2023-04-10

## 2023-04-10 RX ORDER — ALBUTEROL SULFATE 90 UG/1
1-2 AEROSOL, METERED RESPIRATORY (INHALATION) EVERY 4 HOURS PRN
Qty: 18 G | COMMUNITY
Start: 2023-04-10

## 2023-04-10 RX ORDER — MINERAL OIL/HYDROPHIL PETROLAT
OINTMENT (GRAM) TOPICAL AT BEDTIME
COMMUNITY
Start: 2023-04-10

## 2023-04-10 RX ORDER — ACETAMINOPHEN 325 MG/1
650 TABLET ORAL EVERY 4 HOURS PRN
COMMUNITY
Start: 2023-04-10

## 2023-04-10 RX ORDER — PRENATAL VIT/IRON FUM/FOLIC AC 27MG-0.8MG
1 TABLET ORAL DAILY
Qty: 90 TABLET | Refills: 3 | COMMUNITY
Start: 2023-04-11

## 2023-04-10 RX ORDER — GABAPENTIN 300 MG/1
300 CAPSULE ORAL 3 TIMES DAILY PRN
COMMUNITY
Start: 2023-04-10

## 2023-04-10 RX ORDER — MAGNESIUM HYDROXIDE/ALUMINUM HYDROXICE/SIMETHICONE 120; 1200; 1200 MG/30ML; MG/30ML; MG/30ML
30 SUSPENSION ORAL EVERY 4 HOURS PRN
COMMUNITY
Start: 2023-04-10

## 2023-04-10 RX ORDER — NICOTINE 21 MG/24HR
1 PATCH, TRANSDERMAL 24 HOURS TRANSDERMAL DAILY
COMMUNITY
Start: 2023-04-11

## 2023-04-10 RX ORDER — CHOLECALCIFEROL (VITAMIN D3) 125 MCG
6000 CAPSULE ORAL
COMMUNITY
Start: 2023-04-10

## 2023-04-10 RX ORDER — MINERAL OIL/HYDROPHIL PETROLAT
OINTMENT (GRAM) TOPICAL
COMMUNITY
Start: 2023-04-10

## 2023-04-10 RX ORDER — LANOLIN ALCOHOL/MO/W.PET/CERES
3 CREAM (GRAM) TOPICAL
COMMUNITY
Start: 2023-04-10

## 2023-04-10 RX ORDER — QUETIAPINE FUMARATE 100 MG/1
100 TABLET, FILM COATED ORAL 2 TIMES DAILY PRN
COMMUNITY
Start: 2023-04-10

## 2023-04-10 RX ORDER — BENZOYL PEROXIDE 5 G/100G
GEL TOPICAL AT BEDTIME
COMMUNITY
Start: 2023-04-10

## 2023-04-10 RX ORDER — QUETIAPINE FUMARATE 300 MG/1
600 TABLET, FILM COATED ORAL AT BEDTIME
COMMUNITY
Start: 2023-04-10

## 2023-04-10 RX ORDER — BENZTROPINE MESYLATE 1 MG/1
1 TABLET ORAL 2 TIMES DAILY PRN
COMMUNITY
Start: 2023-04-10

## 2023-04-10 RX ORDER — TRAZODONE HYDROCHLORIDE 50 MG/1
50-100 TABLET, FILM COATED ORAL
COMMUNITY
Start: 2023-04-10

## 2023-04-10 RX ORDER — IBUPROFEN 600 MG/1
600 TABLET, FILM COATED ORAL 3 TIMES DAILY PRN
COMMUNITY
Start: 2023-04-10

## 2023-04-10 RX ORDER — HYDROXYZINE HYDROCHLORIDE 25 MG/1
25 TABLET, FILM COATED ORAL EVERY 4 HOURS PRN
COMMUNITY
Start: 2023-04-10

## 2023-04-10 RX ORDER — AMOXICILLIN 250 MG
1 CAPSULE ORAL 2 TIMES DAILY PRN
COMMUNITY
Start: 2023-04-10

## 2023-04-10 RX ADMIN — PRENATAL VITAMINS-IRON FUMARATE 27 MG IRON-FOLIC ACID 0.8 MG TABLET 1 TABLET: at 08:52

## 2023-04-10 RX ADMIN — FLUTICASONE PROPIONATE 1 SPRAY: 50 SPRAY, METERED NASAL at 08:51

## 2023-04-10 RX ADMIN — BUSPIRONE HYDROCHLORIDE 7.5 MG: 7.5 TABLET ORAL at 20:27

## 2023-04-10 RX ADMIN — BUSPIRONE HYDROCHLORIDE 7.5 MG: 7.5 TABLET ORAL at 08:52

## 2023-04-10 RX ADMIN — GABAPENTIN 300 MG: 300 CAPSULE ORAL at 17:19

## 2023-04-10 RX ADMIN — OMEPRAZOLE 20 MG: 20 CAPSULE, DELAYED RELEASE ORAL at 08:52

## 2023-04-10 RX ADMIN — CETIRIZINE HYDROCHLORIDE 10 MG: 10 TABLET, FILM COATED ORAL at 08:52

## 2023-04-10 RX ADMIN — Medication 6000 UNITS: at 08:52

## 2023-04-10 RX ADMIN — BUSPIRONE HYDROCHLORIDE 7.5 MG: 7.5 TABLET ORAL at 14:05

## 2023-04-10 RX ADMIN — WHITE PETROLATUM: 1.75 OINTMENT TOPICAL at 20:29

## 2023-04-10 RX ADMIN — BENZOYL PEROXIDE: 50 GEL TOPICAL at 20:29

## 2023-04-10 RX ADMIN — NICOTINE POLACRILEX 4 MG: 4 GUM, CHEWING BUCCAL at 14:07

## 2023-04-10 RX ADMIN — QUETIAPINE FUMARATE 600 MG: 300 TABLET ORAL at 20:26

## 2023-04-10 RX ADMIN — GABAPENTIN 300 MG: 300 CAPSULE ORAL at 08:53

## 2023-04-10 RX ADMIN — NICOTINE POLACRILEX 4 MG: 4 GUM, CHEWING BUCCAL at 17:18

## 2023-04-10 RX ADMIN — Medication 6000 UNITS: at 17:19

## 2023-04-10 RX ADMIN — TRAZODONE HYDROCHLORIDE 100 MG: 50 TABLET ORAL at 20:27

## 2023-04-10 RX ADMIN — FLUTICASONE PROPIONATE 1 SPRAY: 50 SPRAY, METERED NASAL at 20:29

## 2023-04-10 RX ADMIN — GABAPENTIN 300 MG: 300 CAPSULE ORAL at 20:27

## 2023-04-10 ASSESSMENT — ACTIVITIES OF DAILY LIVING (ADL)
ADLS_ACUITY_SCORE: 29
HYGIENE/GROOMING: INDEPENDENT
DRESS: INDEPENDENT
DRESS: INDEPENDENT
ADLS_ACUITY_SCORE: 29
ORAL_HYGIENE: INDEPENDENT
ADLS_ACUITY_SCORE: 29
ORAL_HYGIENE: INDEPENDENT
ADLS_ACUITY_SCORE: 29
HYGIENE/GROOMING: INDEPENDENT

## 2023-04-10 NOTE — PLAN OF CARE
04/10/23 1824   Group Therapy Session   Group Attendance attended group session   Time Session Began 1600   Time Session Ended 1700   Total Time (minutes) 60   Total # Attendees 5   Group Type task skill;community   Patient Participation Detail OT: Cognitive activity via leisure task for attention, sequencing, communication, new learning, retention, reality-orientation, social engagement, leisure exploration and coping with symptoms.  Pt Response: Pt presented with elevated mood. Appeared eager to report her anticipated discharge tomorrow. Pt was able to recall the steps of this familiar game and communicate the game process to her peers. Once engaged in the game though,  pt occasionally had some difficulty sequencing the turn taking aspect, but was receptive to gentle cueing from staff/peers. Pt appeared to enjoy the abstract leisure task, evidenced by brightened affect and laughter with peers.             NEUROLOGY NOTE:                             Damaso Santos (: 1943) is seen for   Chief Complaint   Patient presents with   • Seizures     follow up    on 2019  Date of last visit:  1/15/2019    Nancy Jordan MD     HISTORY:    No seizures, no TIA, no diplopia. No problems with Keppra.  No recurrence of cancer.  No new illnesses.     PAST MEDICAL, SURGICAL, FAMILY AND SOCIAL HISTORY:  Patient Active Problem List   Diagnosis   • Arteriosclerotic coronary artery disease   • Benign essential HTN   • Dyslipidemia, goal LDL below 70   • Cancer of tonsil, palatine (CMS/HCC)   • Carotid disease, bilateral (CMS/HCC)   • Epilepsy with partial complex seizures, with status epilepticus (CMS/HCC)       PAST MEDICAL HX:    Orthostatic hypotension                                       H/O acute renal failure                                       Cluster headache                                              H/O deep venous thrombosis                                    Gastritis                                                     GERD (gastroesophageal reflux disease)                        Myasthenia gravis (CMS/HCC)                                   Syncope                                                       PAST SURGICAL HX:    APPENDECTOMY                                                  CERVICAL FUSION                                               LUMBAR FUSION                                                 Family History   Problem Relation Age of Onset   • Heart disease Mother    • Heart disease Father    • Hypertension Father    • Congestive Heart Failure Paternal Grandfather      Review of patient's family status indicates:    Mother                                           Father                                           Paternal Grandfather                                       Social History     Socioeconomic History   • Marital status:      Spouse name: Not on file   • Number  of children: Not on file   • Years of education: Not on file   • Highest education level: Not on file   Occupational History   • Occupation: retired   Social Needs   • Financial resource strain: Not on file   • Food insecurity:     Worry: Not on file     Inability: Not on file   • Transportation needs:     Medical: Not on file     Non-medical: Not on file   Tobacco Use   • Smoking status: Former Smoker   • Smokeless tobacco: Former User   Substance and Sexual Activity   • Alcohol use: Yes     Alcohol/week: 16.8 oz     Types: 28 Shots of liquor per week   • Drug use: Not Currently   • Sexual activity: Not on file   Lifestyle   • Physical activity:     Days per week: Not on file     Minutes per session: Not on file   • Stress: Not on file   Relationships   • Social connections:     Talks on phone: Not on file     Gets together: Not on file     Attends [employer]ism service: Not on file     Active member of club or organization: Not on file     Attends meetings of clubs or organizations: Not on file     Relationship status:    • Intimate partner violence:     Fear of current or ex partner: Not on file     Emotionally abused: Not on file     Physically abused: Not on file     Forced sexual activity: Not on file   Other Topics Concern   • Not on file   Social History Narrative   • Not on file       ALLERGIES:    ALLERGIES:   Allergen Reactions   • Ampicillin SWELLING   • Lorazepam Other (See Comments)       MEDICATIONS:    Current Outpatient Medications   Medication Sig Dispense Refill   • fluticasone (FLONASE) 50 MCG/ACT nasal spray Spray 2 sprays in each nostril.     • hydrochlorothiazide (HYDRODIURIL) 25 MG tablet TAKE ONE TABLET BY MOUTH ONCE PER DAY     • mirtazapine (REMERON) 15 MG tablet Take 15 mg by mouth nightly.     • aspirin 325 MG tablet Take 325 mg by mouth daily.     • omeprazole (PRILOSEC) 20 MG capsule Take 20 mg by mouth daily.     • oxyCODONE, IMM REL, (ROXICODONE) 5 MG immediate release tablet  Take 5 mg by mouth every 4 hours as needed.     • atorvastatin (LIPITOR) 40 MG tablet TAKE ONE TABLET BY MOUTH IN THE MORNING     • levETIRAcetam (KEPPRA) 500 MG tablet TAKE ONE TABLET BY MOUTH TWICE DAILY     • gabapentin (NEURONTIN) 100 MG capsule TAKE 2 CAPSULES BY MOUTH TWICE DAILY 360 capsule 3     No current facility-administered medications for this visit.        (per patient report or based on medication list in [facility])    REVIEW OF SYSTEMS:  Review of Systems  Systems reviewed,no new problems    PHYSICAL EXAM:  Vitals: Blood pressure 129/84, pulse 95, height 5' 9\" (1.753 m), weight 74.7 kg (164 lb 10.9 oz).  Constitutional: alert and well nourished.   Head and Face: atraumatic, no deformities.   Neck: normal appearing neck and supple neck . Mass on right.   Cardiovascular: normal rate, no murmurs were heard and regular rhythm.   Neurologic:   Mental status: The patient's orientation, memory, attention, language and fund of knowledge were normal.   Cranial Nerves: Cranial Nerve II: visual acuity and visual fields were intact.  Cranial Nerves III, IV, and VI: the extraocular motions were intact, no nystagmus or ptosis.  Cranial Nerve V: sensation to the face and masseter strength were intact.  Cranial Nerve VII: facial strength was intact bilaterally.  Cranial Nerve VIII: Abnormal.  and Decreased right hearing. Cranial Nerves IX and X: there was normal movement of the soft palate and normal gag.  Cranial Nerve XI: shoulder shrug was intact bilaterally.  Cranial Nerve XII: there was no tongue deviation with protrusion.    Muscle Strength/Tone:. Muscle strength and tone were normal.   Reflexes: Normal DTRs.   Sensation: No sensory deficits to vibration, position, light touch, and pin prick.   Gait and Station: Normal Gait.       IMPRESSION/PLAN:    (G40.201) Partial symptomatic epilepsy with complex partial seizures, not intractable, with status epilepticus (CMS/[facility])  (primary encounter diagnosis)    (I65.23)  Bilateral carotid artery stenosis    Doing well.  Continue Keppra.  Discussed that it is reasonable for him to continue on a low dose of gabapentin.  Call if has any problems.    Orders Placed This Encounter   • fluticasone (FLONASE) 50 MCG/ACT nasal spray   • hydrochlorothiazide (HYDRODIURIL) 25 MG tablet     Return in about 6 months (around 11/6/2019).    Greater than 50% of the visit was spent counseling regarding above issues; total time spent 25 minutes.      Merrill Cavanaugh MD  [facility]  [address] - [address] [place] [address]  Phone: [phone]  Fax: [phone]

## 2023-04-10 NOTE — PLAN OF CARE
Problem: Plan of Care - These are the overarching goals to be used throughout the patient stay.    Goal: Plan of Care Review  Description: The Plan of Care Review/Shift note should be completed every shift.  The Outcome Evaluation is a brief statement about your assessment that the patient is improving, declining, or no change.  This information will be displayed automatically on your shift note.  Outcome: Progressing     Problem: Psychotic Signs/Symptoms  Goal: Improved Behavioral Control (Psychotic Signs/Symptoms)  Outcome: Progressing   Goal Outcome Evaluation:    Plan of Care Reviewed With: patient          Patient was visible in the milieu, interacting with select peers, playing games with peers. Presents with a full range affect with peers, mood is calm and sometimes excitable with a loud bizarre laugh. Patient was irritable with staff RN each time patient was approached. Patient denied all mental health symptoms but appears tensed each time the staff RN approaches her for assessment. Patient denied pain, requested and received Nicotine gum this shift. Patient denied any concern with bowel and bladder, ate 100% of dinner and snacks. Medication compliant PRN Trazodone and Gabapentin were given to the patient at HS. VSS, will continue to follow the plan of care.    /65 (BP Location: Left arm)   Pulse 101   Temp 98.1  F (36.7  C) (Oral)   Resp 16   Ht 1.524 m (5')   Wt 88.1 kg (194 lb 3.2 oz)   SpO2 99%   BMI 37.93 kg/m

## 2023-04-10 NOTE — PLAN OF CARE
Assessment/Intervention/Current Symtoms and Care Coordination:  -Chart review  -Team meeting: nurse reports Asha was irritable this morning, had court at 9:30, is taking her medications, ate all her breakfast.     WR coordinated with [employer] court which required additional information from provider regarding medications on Lyons.     WR emailed with  [facility] regarding medications and PD, [facility] clarified that Asha will not need a PD due to transferring to another hospital. He stated that MAR can be sent to [facility]    WR received notice for CM that  will be coming later tomorrow for transport then was anticipated - they will come at 2 PM    Current Symptoms include the following: Psychosis, disorganization, patient is irritable  Precautions: Elopement and Cheeking     Discharge Plan or Goal:  Pending stabilization & development of a safe discharge plan.  Considerations include: IRTS with plan to pursue group home placement     Barriers to Discharge:  Asha presents with disorganized thought process and paranoia. She requires symptom stabilization, medication management, and supportive discharge plan.      Referral Status:  Referrals complete - Asha will discharge to [facility] on Tuesday 4/11 at 2:00PM.     Legal Status:  Patient is under an MICD Commitment in [facility]   Case No. [id number]     Contacts:    [facility]  - Sanford Marinelli (phone: [phone], email: [email or web address])    Tyesha Early - commitment  with [employer] ([phone])        Rationale for SIO/No Roommate Order:  Patient is not on SIO.  Patient has current roommate.

## 2023-04-10 NOTE — PROGRESS NOTES
"[facility]   Psychiatric Progress Note  Hospital Day: 20        Interim History:   The patient's care was discussed with the treatment team during the daily team meeting and/or staff's chart notes were reviewed.  Staff report patient visible in milieu, dismissive in some interactions, taking medications as prescribed, has court hearing today, has been appropriate with getting new roommate, plan for discharge tomorrow morning.     Upon interview, pt reports mood was \"fine\" over weekend, denies SI, HI or AVH. She is tolerating medications. Continues to not be interested in medications for BG or BP, aware of recommendations. She is aware she has court today regarding Lyons order. Also aware discharge will be tomorrow to [facility]. No additional concerns.     Writer received messages through [employer] inbox that call was received from [facility]/, writer attempted to return call x2 and left message with more direct call back numbers. Prepared and submitted document regarding Lyons medication requests from [facility]. See CTC notes for additional details.          Medications:       benzoyl peroxide   Topical At Bedtime     busPIRone  7.5 mg Oral TID     cetirizine  10 mg Oral Daily     fluticasone  1 spray Both Nostrils BID     lactase  6,000 Units Oral TID w/meals     mineral oil-hydrophilic petrolatum   Topical At Bedtime     nicotine  1 patch Transdermal Daily     nicotine   Transdermal Q8H     omeprazole  20 mg Oral Daily     prenatal multivitamin w/iron  1 tablet Oral Daily     QUEtiapine  600 mg Oral At Bedtime          Allergies:     Allergies   Allergen Reactions     Haloperidol Other (See Comments)     Shakes      Morphine Hives     Dust Mite Extract      Watery eyes and runny nose     Pollen Extract      Watery eyes and stuffy nose     Zyprexa [Olanzapine]      Skin rash, wants to talk to her MD about it          Labs:     No results found for this or any " "previous visit (from the past 48 hour(s)).       Psychiatric Examination:     /65 (BP Location: Left arm)   Pulse 101   Temp 98.1  F (36.7  C) (Oral)   Resp 16   Ht 1.524 m (5')   Wt 88.1 kg (194 lb 3.2 oz)   SpO2 99%   BMI 37.93 kg/m    Weight is 194 lbs 3.2 oz  Body mass index is 37.93 kg/m .    Orthostatic Vitals       Most Recent      Sitting Orthostatic /88 04/10 0853    Sitting Orthostatic Pulse (bpm) 119 04/10 0853    Standing Orthostatic /90 04/10 0853    Standing Orthostatic Pulse (bpm) 132 04/10 0853        Appearance: awake, alert and adequately groomed  Attitude:  somewhat cooperative  Eye Contact:  fair  Mood:  less labile \"doing good\"  Affect:  mood congruent  Speech:  appropriate volume, normal prosody  Language: fluent and intact in English  Psychomotor, Gait, Musculoskeletal:  no evidence of tardive dyskinesia, dystonia, or tics  Thought Process:  more goal oriented, continues to be illogical around treatment/medications at times   Associations:  no loose associations  Thought Content:  no evidence of suicidal ideation or homicidal ideation and denies AVH, appears paranoid/guarded at times, no paranoid statements today   Insight:  limited  Judgement:  limited  Oriented to:  time, person, and place  Attention Span and Concentration:  limited  Recent and Remote Memory:  limited  Fund of Knowledge:  appropriate    Clinical Global Impressions  First:  Considering your total clinical experience with this particular patient population, how severe are the patient's symptoms at this time?: 7 (03/21/23 1752)  Compared to the patient's condition at the START of treatment, this patient's condition is: 4 (03/21/23 1752)  Most recent:  Considering your total clinical experience with this particular patient population, how severe are the patient's symptoms at this time?: 6 (04/07/23 1447)  Compared to the patient's condition at the START of treatment, this patient's condition is: 3 " "(23 [id number])         Precautions:     Behavioral Orders   Procedures     Cheeking Precautions (behavioral units)     Code 1 - Restrict to Unit     Elopement precautions     Routine Programming     As clinically indicated     Status 15     Every 15 minutes.          Diagnoses:      Schizoaffective disorder, depressed type vs bipolar type, with acute decompensation in psychosis symptoms and some mood instability (provisional)  Unspecified anxiety   Polysubstance use disorder including opioid and methamphetamine   Nicotine use disorder  R/O substance induced psychosis   Seasonal allergies and rhinitis   Asthma   Obesity   Type 2 DM poorly controlled          Assessment & Plan:   Assessment and hospital summary:  This patient is a 27 year old female under commitment in [facility] with history of psychosis and polysubstance use who presented to ED from [facility] due to concerns for worsening psychosis despite medication changes and inability to engage in program. She was discharged from , ED staff informed patients PD was revoked and she is now being admitted under committed status with plan to discharge to [facility] once stable per her [facility] CM. She was not cooperative with admission interview, majority of history obtained from chart review, will continue current medications as noted in chart, ordered admission labs, plan to coordinate with patients CM regarding disposition as patient stabilizes. Patient has continued to show limited insight into psychiatric symptoms and need for treatment, is illogical in requests around medications and willingness to take medications, fixated on the color of medications or concerned they are \"the right one\" which appears based in paranoia/delusions. Appears Lyons had , writer submitted request for a new Lynos 3/28/23.      Inpatient psychiatric hospitalization is warranted at this time for safety, stabilization, and possible adjustment in medications.    Psychiatric " treatment/inteventions:  Medications:   -continue PTA quetiapine (now in IR formulation) at 600mg at bedtime with additional IR 100mg BID PRN for agitation and psychosis (changed from XR due to patient declining to take XR medications due to pills being yellow in color)  -continue PTA buspirone 7.5mg TID for anxiety, plan to increase as indicated/toerlated     -continue PRN benzotropine 1mg BID for possible EPSE   -PRN hydroxyzine 25mg every 4 hour for anxiety  -continue PRN trazodone 100mg at bedtime for sleep   -PRN thorazine 10mg PO or 25mg IM with benadryl 50mg PO or IM TID for agitation/psychosis (pt has olanzapine listed as allergy)  -clonidine 0.1mg BID PRN for anxiety given some recent elevated HR and BPs, hold parameters in place     -also ordered PRN aquaphor for dry skin as PRN vanicream not available. Continue Lactaid for lactose intolerance, nicotine patch and prenatal vitamin per pts request  -PRN icy hot ordered for neck/shoulder pain, also ordered soft care mattress     The risks, benefits, alternatives and side effects have been discussed and are understood by the patient.     Laboratory/Imaging: no new labs ordered     Patient will be treated in therapeutic milieu with appropriate individual and group therapies as described.     Medical treatment/interventions:  Medical concerns: Pt denying acute medical concerns, will continue PTA flonase and zyrtec for allergies, PRN albuterol inhaler for asthma and nicotine gum for nicotine withdrawal. Elevated HgbA1c and abnormal lipid panel, IM consult placed 3/23, per note:   Asha Arrieta is a 27 year old female admitted on 3/15/2023. She has a past medical history of T2DM, schizoaffective disorder (depressed versus bipolar type), polysubstance use disorder (opioid, methamphetamine, tobacco), seasonal allergies, asthma who was admitted after presenting to the ED from [facility] for worsening psychosis.  Medicine consulted for diabetes  "management.     Type 2 Diabetes Mellitus, non-insulin-dependent, poorly controlled  A1c this morning 8.1%. Prior to this was 7.4% on 10/17/2022 in Care Everywhere. According to outside medication record, patient had been on Metformin 500 mg daily in the past but stated that she had side effects and would not elaborate further.  Limited data available for review, but glucose trends appear to be elevated above goal (190s-250s).  Patient unwilling to discuss diabetes with writer or \"anyone from this hospital\".  She vehemently denied having diabetes despite writer discussing that her A1c currently categorizes her as having diabetes, and became uncooperative.   - Discussed patient case with Dr. Donal Abrams. Should the patient be open to discussing diabetes with Internal Medicine, we are happy to come back and discuss management with the patient.  - Follow-up with PCP otherwise     Hyperlipidemia, mixed  Lipid panel drawn per psychiatry on admission showing low HDL, high LDL, high triglycerides.  - Follow-up with PCP within 1-2 weeks after discharge     Medicine will sign off. No further recommendations at this time.  Please feel free to reconsult if any new medical issues or concerns.         The patient's care was discussed with the Bedside Nurse and Patient, Primary Team (Dr. Katie Abrams).        Clinically Significant Risk Factors []Expand by Default                         # DMII: A1C = 8.1 % (Ref range: <5.7 %) within past 6 months, PRESENT ON ADMISSION  # Obesity: Estimated body mass index is 37.09 kg/m  as calculated from the following:    Height as of this encounter: 1.524 m (5').    Weight as of this encounter: 86.1 kg (189 lb 14.4 oz)., PRESENT ON ADMISSION             Toby Rendon PA-C  Hospitalist Service      Disposition Plan   Reason for ongoing admission: is unable to care for self due to severe psychosis or luli  Discharge location: [facility] on Tuesday see CTC notes   Discharge Medications: " not ordered, does not require meds on discharge, just med orders  Follow-up Appointments: not scheduled, going to [facility]  Legal Status: full commitment, appears Lyons , CTC looking into process for filing new Lyons with current commitment, new Lyons request sent to [facility] 3/28, requested thorazine, quetiapine, aripiprazole, risperidone     >50min total time that was spent in counseling and coordination of care with staff, reviewing medical record, educating patient about treatment options, side effects and benefits and alternative treatments for medications, providing supportive therapy and redirection regarding above symptoms. Also time spent clarifying Lyons, preparing documentation regarding medications and doses.      This document is created with the help of Dragon dictation system.  All grammatical/typing errors or context distortion are unintentional and inherent to software.    Patient has been seen and evaluated by Katie adams DO.

## 2023-04-10 NOTE — PLAN OF CARE
Pt has been in the milieu. Social with select peers. Pt watched TV with peers. Pt has been heard laughing with peers. Pt was tense and irritable with writer this am. Guarded. Poor insight and judgement. Pt ate breakfast and lunch in the lounge. Pt was medication compliant except refused nicotine patch and noon lactase dose.  Pt requested PRN gabapentin with am medications and stated it was helpful. Pt will discharge tomorrow at 2pm.  PT attended group.     Goal Outcome Evaluation: ongoing

## 2023-04-10 NOTE — PLAN OF CARE
Goal Outcome Evaluation:  Problem: Sleep Disturbance  Goal: Adequate Sleep/Rest  Outcome: Progressing      Patient slept 6.5 during the shift. Safety checks done every 15 minutes. No PRN were given. No concern were noted.

## 2023-04-10 NOTE — PLAN OF CARE
Goal Outcome Evaluation:    Plan of Care Reviewed With: patient          Pt visible in milieu. Affect blunted. Eye contact appropriate. Guarded initially on approach, but later pleasant, calm, cooperative. Attended and participated in group. Appropriate interactions with peers. Watches TV and listens to headphones as distraction. Requested/received gabapentin 300 mg po PRN and nicotine gum early in shift. Appetite good. Hygiene adequate. Pt aware of discharge planned for 2 PM tomorrow. Denied SI or any other safety concerns. Compliant with scheduled medications. Requested/received Trazodone 100 mg po PRN and gabapentin 300 mg po PRN at HS. Continue with current treatment plan and recommendations. Continue to monitor and reassess symptoms. Monitor response to medications. Monitor progress towards treatment goals. Encourage groups and participation.

## 2023-04-10 NOTE — PLAN OF CARE
OCCUPATIONAL THERAPY GROUP NOTE:     04/10/23 1258   Group Therapy Session   Group Attendance attended group session   Time Session Began 1030   Time Session Ended 1200   Total Time (minutes) 90   Total # Attendees 6   Group Type task skill   Group Topic Covered structured socialization;leisure exploration/use of leisure time;problem-solving;coping skills/lifestyle management   Group Session Detail OT Clinic Group x2   Patient Response/Contribution cooperative with task;listened actively;organized   Patient Participation Detail Occupational therapy clinic. Purpose of structured group: exploration/development of positive coping skills, engagement in creative expression and clinical observation of social, cognitive, and kinesthetic performance skills. Pt response: Pt actively participated in occupational therapy clinic. Chosen activity: small tristian dot projects. Independent to initiate, gather materials, sequence and adjust to workspace demands as needed. Demonstrated excellent focus (80 min without interruption), planning, and attention to detail for this moderately complex task. Able to ask for assistance as needed, however she generally kept to herself and listened to her headphones while she worked. Future-oriented in conversation, and her affect appeared to brighten in interactions.

## 2023-04-11 VITALS
SYSTOLIC BLOOD PRESSURE: 125 MMHG | HEART RATE: 114 BPM | DIASTOLIC BLOOD PRESSURE: 75 MMHG | OXYGEN SATURATION: 98 % | WEIGHT: 195.7 LBS | HEIGHT: 60 IN | BODY MASS INDEX: 38.42 KG/M2 | RESPIRATION RATE: 16 BRPM | TEMPERATURE: 98.6 F

## 2023-04-11 PROCEDURE — 250N000013 HC RX MED GY IP 250 OP 250 PS 637: Performed by: PSYCHIATRY & NEUROLOGY

## 2023-04-11 PROCEDURE — 99239 HOSP IP/OBS DSCHRG MGMT >30: CPT | Performed by: PSYCHIATRY & NEUROLOGY

## 2023-04-11 RX ADMIN — PRENATAL VITAMINS-IRON FUMARATE 27 MG IRON-FOLIC ACID 0.8 MG TABLET 1 TABLET: at 08:46

## 2023-04-11 RX ADMIN — BUSPIRONE HYDROCHLORIDE 7.5 MG: 7.5 TABLET ORAL at 08:40

## 2023-04-11 RX ADMIN — CETIRIZINE HYDROCHLORIDE 10 MG: 10 TABLET, FILM COATED ORAL at 08:40

## 2023-04-11 RX ADMIN — Medication 6000 UNITS: at 08:40

## 2023-04-11 RX ADMIN — OMEPRAZOLE 20 MG: 20 CAPSULE, DELAYED RELEASE ORAL at 08:40

## 2023-04-11 RX ADMIN — FLUTICASONE PROPIONATE 1 SPRAY: 50 SPRAY, METERED NASAL at 08:40

## 2023-04-11 ASSESSMENT — ACTIVITIES OF DAILY LIVING (ADL)
HYGIENE/GROOMING: INDEPENDENT
DRESS: INDEPENDENT
ADLS_ACUITY_SCORE: 29
ORAL_HYGIENE: INDEPENDENT

## 2023-04-11 NOTE — DISCHARGE SUMMARY
Psychiatric Discharge Summary    Asha Arrieta MRN# [id number]   Age: 27 year old YOB: 1995     Date of Admission:  3/15/2023  Date of Discharge:  4/11/2023 11:36 AM  Admitting Physician:  Katie Abrams DO  Discharge Physician:  Katie Abrams DO         Event Leading to Hospitalization:   Asha is a 27 year old female under commitment in [facility] with history of psychosis and polysubstance use who presented to ED from [facility] due to concerns for worsening psychosis despite medication changes and inability to engage in program.      Chart review completed including ED notes, DEC assessment, notes from recent time at [facility], notes through [facility] that could be assessed, limited psychiatric history noted in chart, have reviewed outpatient psychiatrst Dr. May's notes including most recent from 3/9/23 and 3/14/23. On 3/9 her quetiapine dose was increased to further target concerns for ongoing psychosis including deluional thoughts she is pregnant and peers are plotting against her family. Quetiapine increased to XR 600mg at bedtime and IR 100mg BID PRN. On 3/14 still noting some psychosis symptoms and delusional thoughts but reporting more anxiety and therefore buspirone was added.      Per ED Physician note on 3/15: Asha Arrieta is a 27 year old female with a past medical history significant for bipolar type schizoaffective disorder, opioid abuse, methamphetamine abuse who presents to the Emergency Department for mental health evaluation. Patient was seen by DEC . Per , patient is here from [facility] as they state they treat for substance use and do not want to treat the patient for mental health. Patient states she had an appointment today and was meeting with her PCP on her phone. [facility] did not know the patient had an appointment today could not see it on their schedule and thought she was having delusional thinking when she said  she had an appointment. They said that the patient was not participating in programming, the patient said she was participating but wanted to go to her appointment then come back. Situation escalated and patient's colored pencils were dumped on the floor. Patient said she felt unsafe, so she was sent here.     Per DEC assessment on 3/15: Patient reports ongoing conflict with the [facility] staff regarding her level of participation.  Patient admits she did not read a purple information packet upon her arrival, but has also felt that when she does speak up in groups her opinions are not valued.  Patient reports she had a doctor's appointment this morning which she forgot to tell staff about.  Patient left group to take the doctor's appointment virtually.  Patient reports the [facility] staff said she did not have an appointment because it was not showing up in [employer], but this appointment was outside of the [employer] system.  Patient reports during this verbal argument with staff, she dropped her beverage and colored pencils. Patient felt that she could have accidentally hurt herself with the colored pencils all over the ground.  Patient reports telling them she did not feel safe because of this, but reports she did not spill these pencils on purpose.  Patient reports other clients helped her  the pencils which made her feel better.  Patient reports the [facility] staff nonetheless felt she needed to be evaluated in our emergency department due to feeling unsafe.  Patient reports she wants to complete [facility], but they have been trying to kick her out since she arrived.  Patient  reports frustration that staff allegedly told her they would come back to get her but are now refusing to accept her back.     Significant Clinical and Psychosocial History  Patient is currently under provisional discharge from civil commitment in [facility]. Patient was mot recently at [facility], followed  "by [facility]. Patient then transferred to our [facility] where she has been in treatment for the past 2 weeks. Patient has about 2 weeks left in the program and would like to complete it. Patient would otherwise be homeless and unemployed. Patient has no history of inpatient mental health admissions visible in medical record. Patient reports having therapists Micheal Ring and Lashanda Paz at [facility]. Patient has psychiatry and primary care in [place] with providers listed in current care team below.     Collateral Information  Per [facility] therapist Delphine, patient has been inappropriate for their program from the beginning because they are not a mental health treatment program, only substance use.  Patient is reportedly out of their scope and ability to treat.  Patient is reportedly constantly interrupting group, disruptive, loud, and aggressive.  Patient has made paranoid comments such as someone is sneaking into her room to give her tuberculosis. Patient has also made comments since her arrival including:\"my children and grandchildren were in detox with me and I now see them inside the other people here,\" and \"people here are being grown for their skin to help clean the insides of other people.\" Patient's therapist reports patient made a comment that she felt unsafe today, so they brought her to the emergency department in order to seek alternative level of care such as \"BEC\".  Patient's therapist reports the  is looking for an  IRTS.       spoke with patient's [facility]  who reports they are looking for an IRTS placement, but this will not be secured overnight.  If patient is not allowed back at [facility], they would like her to board in our emergency department as she is vulnerable in the community due to her mental health and substance use.  Patient's  reports plan to revoke her provisional discharge " "from civil commitment.     Per , patient does appear to be making some paranoid comments, including saying that she is worried someone is coming in her room at night. However, she is not acutely psychotic, not hallucinating, and is very organized. Patient is not suicidal or homicidal.     Per [facility] records, patient saw her psychiatrist yesterday and there were concerns for ongoing paranoia, and patient's lack of involvement in her treatment program. Patient's psychosis was initially felt to be substance-induced or exacerbated by substance use, but now likely is an emerging primary psych illness consistent with schizoaffective disorder. Patient is prescribed quetiapine  mg HS, and 100 mg BID prn anxiety and paranoia.        Upon interview in [facility] prior to admission to [facility]: Patient was in room watching TV, writer introduced self attempting to review information as above regarding patients MH cand CD history along with events that led to admission, patient stated that she didn't want to \"talk about that bullshit\" and feels that she has been \"under a commitment for too damn long\" and wants to have a different  due to being upset that her PD was revoked. She expressed frustration with being discharged from [facility] and the care she was receiving in the [facility]. She stated her medications were incorrect but refused to review them with writer ot correct them stated she \"isn' talking about this shit anymore\", writer continued to attempt to engage her, she denied safety concerns including SI or HI, denied AVH. She has no concerns with her mental health other than not being given correct medications, she denied physical health concerns, when writer again attempted to get more history she stated she was done talking, closed her eyes, crossed her arms over her chest and would not respond to writer any longer despite several minutes of writer attempting to engage with her therefore interview terminated.  "       See Admission note by Katie Abrams DO on 3/21/23 for additional details.          DIagnoses:     Schizoaffective disorder, depressed type vs bipolar type, with acute decompensation in psychosis symptoms and some mood instability (provisional)  Unspecified anxiety   Polysubstance use disorder including opioid and methamphetamine   Nicotine use disorder  R/O substance induced psychosis   Seasonal allergies and rhinitis   Asthma   Obesity   Type 2 DM poorly controlled   Elevated BPs         Labs:     Recent Results (from the past 672 hour(s))   Comprehensive metabolic panel    Collection Time: 03/16/23  6:12 PM   Result Value Ref Range    Sodium 136 136 - 145 mmol/L    Potassium 4.4 3.4 - 5.3 mmol/L    Chloride 99 98 - 107 mmol/L    Carbon Dioxide (CO2) 26 22 - 29 mmol/L    Anion Gap 11 7 - 15 mmol/L    Urea Nitrogen 9.1 6.0 - 20.0 mg/dL    Creatinine 0.70 0.51 - 0.95 mg/dL    Calcium 9.8 8.6 - 10.0 mg/dL    Glucose 251 (H) 70 - 99 mg/dL    Alkaline Phosphatase 140 (H) 35 - 104 U/L    AST 23 10 - 35 U/L    ALT 36 (H) 10 - 35 U/L    Protein Total 7.0 6.4 - 8.3 g/dL    Albumin 4.0 3.5 - 5.2 g/dL    Bilirubin Total 0.2 <=1.2 mg/dL    GFR Estimate >90 >60 mL/min/1.73m2   CBC with platelets and differential    Collection Time: 03/16/23  6:12 PM   Result Value Ref Range    WBC Count 9.9 4.0 - 11.0 10e3/uL    RBC Count 4.68 3.80 - 5.20 10e6/uL    Hemoglobin 11.9 11.7 - 15.7 g/dL    Hematocrit 38.3 35.0 - 47.0 %    MCV 82 78 - 100 fL    MCH 25.4 (L) 26.5 - 33.0 pg    MCHC 31.1 (L) 31.5 - 36.5 g/dL    RDW 14.5 10.0 - 15.0 %    Platelet Count 260 150 - 450 10e3/uL    % Neutrophils 70 %    % Lymphocytes 22 %    % Monocytes 5 %    % Eosinophils 2 %    % Basophils 1 %    % Immature Granulocytes 0 %    NRBCs per 100 WBC 0 <1 /100    Absolute Neutrophils 6.9 1.6 - 8.3 10e3/uL    Absolute Lymphocytes 2.2 0.8 - 5.3 10e3/uL    Absolute Monocytes 0.5 0.0 - 1.3 10e3/uL    Absolute Eosinophils 0.2 0.0 - 0.7 10e3/uL    Absolute  Basophils 0.1 0.0 - 0.2 10e3/uL    Absolute Immature Granulocytes 0.0 <=0.4 10e3/uL    Absolute NRBCs 0.0 10e3/uL   HCG qualitative urine    Collection Time: 03/21/23  3:59 PM   Result Value Ref Range    hCG Urine Qualitative Negative Negative   Drug abuse screen 1 urine (ED)    Collection Time: 03/21/23  4:00 PM   Result Value Ref Range    Amphetamines Urine Screen Negative Screen Negative    Barbituates Urine Screen Negative Screen Negative    Benzodiazepine Urine Screen Negative Screen Negative    Cannabinoids Urine Screen Negative Screen Negative    Cocaine Urine Screen Negative Screen Negative    Opiates Urine Screen Negative Screen Negative   Comprehensive metabolic panel    Collection Time: 03/22/23  8:27 AM   Result Value Ref Range    Sodium 136 136 - 145 mmol/L    Potassium 4.0 3.4 - 5.3 mmol/L    Chloride 102 98 - 107 mmol/L    Carbon Dioxide (CO2) 22 22 - 29 mmol/L    Anion Gap 12 7 - 15 mmol/L    Urea Nitrogen 12.2 6.0 - 20.0 mg/dL    Creatinine 0.60 0.51 - 0.95 mg/dL    Calcium 9.3 8.6 - 10.0 mg/dL    Glucose 142 (H) 70 - 99 mg/dL    Alkaline Phosphatase 124 (H) 35 - 104 U/L    AST 19 10 - 35 U/L    ALT 26 10 - 35 U/L    Protein Total 6.6 6.4 - 8.3 g/dL    Albumin 3.7 3.5 - 5.2 g/dL    Bilirubin Total 0.3 <=1.2 mg/dL    GFR Estimate >90 >60 mL/min/1.73m2   Hemoglobin A1c    Collection Time: 03/22/23  8:27 AM   Result Value Ref Range    Hemoglobin A1C 8.1 (H) <5.7 %   Lipid panel    Collection Time: 03/22/23  8:27 AM   Result Value Ref Range    Cholesterol 192 <200 mg/dL    Triglycerides 152 (H) <150 mg/dL    Direct Measure HDL 31 (L) >=50 mg/dL    LDL Cholesterol Calculated 131 (H) <=100 mg/dL    Non HDL Cholesterol 161 (H) <130 mg/dL   TSH with free T4 reflex and/or T3 as indicated    Collection Time: 03/22/23  8:27 AM   Result Value Ref Range    TSH 0.94 0.30 - 4.20 uIU/mL   CBC with platelets and differential    Collection Time: 03/22/23  8:27 AM   Result Value Ref Range    WBC Count 7.9 4.0 - 11.0  10e3/uL    RBC Count 4.63 3.80 - 5.20 10e6/uL    Hemoglobin 11.8 11.7 - 15.7 g/dL    Hematocrit 37.1 35.0 - 47.0 %    MCV 80 78 - 100 fL    MCH 25.5 (L) 26.5 - 33.0 pg    MCHC 31.8 31.5 - 36.5 g/dL    RDW 14.2 10.0 - 15.0 %    Platelet Count 230 150 - 450 10e3/uL    % Neutrophils 64 %    % Lymphocytes 26 %    % Monocytes 7 %    % Eosinophils 2 %    % Basophils 1 %    % Immature Granulocytes 0 %    NRBCs per 100 WBC 0 <1 /100    Absolute Neutrophils 5.1 1.6 - 8.3 10e3/uL    Absolute Lymphocytes 2.0 0.8 - 5.3 10e3/uL    Absolute Monocytes 0.5 0.0 - 1.3 10e3/uL    Absolute Eosinophils 0.2 0.0 - 0.7 10e3/uL    Absolute Basophils 0.1 0.0 - 0.2 10e3/uL    Absolute Immature Granulocytes 0.0 <=0.4 10e3/uL    Absolute NRBCs 0.0 10e3/uL   Asymptomatic COVID-19 Virus (Coronavirus) by PCR Nasopharyngeal    Collection Time: 04/04/23  8:54 PM    Specimen: Nasopharyngeal; Swab   Result Value Ref Range    SARS CoV2 PCR Negative Negative              Consults and medical concerns addressed:   Pt denying acute medical concerns on admission, continued PTA flonase and zyrtec for allergies, PRN albuterol inhaler for asthma and nicotine gum for nicotine withdrawal. Elevated HgbA1c and abnormal lipid panel, IM consult placed 3/23, per note:   Asha Arrieta is a 27 year old female admitted on 3/15/2023. She has a past medical history of T2DM, schizoaffective disorder (depressed versus bipolar type), polysubstance use disorder (opioid, methamphetamine, tobacco), seasonal allergies, asthma who was admitted after presenting to the ED from [facility] for worsening psychosis.  Medicine consulted for diabetes management.     Type 2 Diabetes Mellitus, non-insulin-dependent, poorly controlled  A1c this morning 8.1%. Prior to this was 7.4% on 10/17/2022 in Care Everywhere. According to outside medication record, patient had been on Metformin 500 mg daily in the past but stated that she had side effects and would not elaborate further.  Limited  "data available for review, but glucose trends appear to be elevated above goal (190s-250s).  Patient unwilling to discuss diabetes with writer or \"anyone from this hospital\".  She vehemently denied having diabetes despite writer discussing that her A1c currently categorizes her as having diabetes, and became uncooperative.   - Discussed patient case with Dr. Donal Abrams. Should the patient be open to discussing diabetes with Internal Medicine, we are happy to come back and discuss management with the patient.  - Follow-up with PCP otherwise     Hyperlipidemia, mixed  Lipid panel drawn per psychiatry on admission showing low HDL, high LDL, high triglycerides.  - Follow-up with PCP within 1-2 weeks after discharge     Medicine will sign off. No further recommendations at this time.  Please feel free to reconsult if any new medical issues or concerns.         The patient's care was discussed with the Bedside Nurse and Patient, Primary Team (Dr. Katie Abrams).        Clinically Significant Risk Factors []?Expand by Default                          # DMII: A1C = 8.1 % (Ref range: <5.7 %) within past 6 months, PRESENT ON ADMISSION  # Obesity: Estimated body mass index is 37.09 kg/m  as calculated from the following:    Height as of this encounter: 1.524 m (5').    Weight as of this encounter: 86.1 kg (189 lb 14.4 oz)., PRESENT ON ADMISSION             Toby Rendon PA-C  Hospitalist Service           Hospital Course:   Asha Arrieta is a 27 year old female under commitment in [facility] with history of psychosis and polysubstance use who presented to ED from [facility] due to concerns for worsening psychosis despite medication changes and inability to engage in program. She was discharged from , ED staff informed patients PD was revoked and she is now being admitted under committed status with plan to discharge to [facility] once stable per her county CM. She was not cooperative with admission " "interview, majority of history obtained from chart review, continued current medications as noted in chart, ordered admission labs, plan to coordinate with patients CM regarding disposition as patient stabilized. Patient continued to show limited insight into psychiatric symptoms and need for treatment, is illogical in requests around medications and willingness to take medications, fixated on the color of medications or concerned they are \"the right one\" which appears based in paranoia/delusions. Appears Lyons had , submitted request for a new Lyons 3/28/23. Patient also not willing to cooperate with recommendations by IM regarding medical concerns, see consults above.     Asha Arrieta did participate in groups and was visible in the milieu. The patient's symptoms of psychosis improved, she continued to labile in mood at times but overall improvement. Was accepted to [facility] for discharge. Lyons hearing also completed prior to discharge. Today Asha Arrieta reports having no thoughts of harming self or others. In addition, she has notable risk factors for self-harm, including age, single status, psychosis, substance abuse and previous suicide attempts. However, risk is mitigated by commitment to family, ability to volunteer a safety plan, history of seeking help when needed and patient has been future oriented throughout admission. Therefore, based on all available evidence including the factors cited above, she does not appear to be at imminent risk for self-harm, does not meet criteria for a 72-hr hold, and therefore remains appropriate for ongoing outpatient level of care.     Asha Arrieta was discharged to [facility]. At the time of discharge Asha Arrieta was determined to not be a danger to herself or others.          Discharge Medications:     Current Discharge Medication List      START taking these medications    Details   acetaminophen (TYLENOL) 325 MG tablet Take 2 tablets (650 mg) by " mouth every 4 hours as needed for mild pain (to moderate pain)      alum & mag hydroxide-simethicone (MAALOX) 200-200-20 MG/5ML SUSP suspension Take 30 mLs by mouth every 4 hours as needed for indigestion      benzocaine-menthol (CHLORASEPTIC) 6-10 MG lozenge Place 1 lozenge inside cheek every hour as needed for sore throat or other (cough)      benzoyl peroxide 5 % topical gel Apply topically At Bedtime      benztropine (COGENTIN) 1 MG tablet Take 1 tablet (1 mg) by mouth 2 times daily as needed for movement disorders      gabapentin (NEURONTIN) 300 MG capsule Take 1 capsule (300 mg) by mouth 3 times daily as needed for other (anxiety)      hydrOXYzine (ATARAX) 25 MG tablet Take 1 tablet (25 mg) by mouth every 4 hours as needed for anxiety      ibuprofen (ADVIL/MOTRIN) 600 MG tablet Take 1 tablet (600 mg) by mouth 3 times daily as needed for inflammatory pain, mild pain or moderate pain      lactase (LACTAID) 3000 UNIT tablet Take 2 tablets (6,000 Units) by mouth 3 times daily (with meals)      melatonin 3 MG tablet Take 1 tablet (3 mg) by mouth nightly as needed for sleep      !! mineral oil-hydrophilic petrolatum (AQUAPHOR) external ointment Apply topically At Bedtime      !! mineral oil-hydrophilic petrolatum (AQUAPHOR) external ointment Apply topically every hour as needed for dry skin      nicotine (NICODERM CQ) 21 MG/24HR 24 hr patch Place 1 patch onto the skin daily      Prenatal Vit-Fe Fumarate-FA (PRENATAL MULTIVITAMIN W/IRON) 27-0.8 MG tablet Take 1 tablet by mouth daily  Qty: 90 tablet, Refills: 3      senna-docusate (SENOKOT-S/PERICOLACE) 8.6-50 MG tablet Take 1 tablet by mouth 2 times daily as needed for constipation      traZODone (DESYREL) 50 MG tablet Take 1-2 tablets ( mg) by mouth nightly as needed for sleep (may repeat after 60 minutes)       !! - Potential duplicate medications found. Please discuss with provider.      CONTINUE these medications which have CHANGED    Details   albuterol  (PROAIR HFA/PROVENTIL HFA/VENTOLIN HFA) 108 (90 Base) MCG/ACT inhaler Inhale 1-2 puffs into the lungs every 4 hours as needed for wheezing  Qty: 18 g    Comments: Pharmacy may dispense brand covered by insurance (Proair, or proventil or ventolin or generic albuterol inhaler)      ondansetron (ZOFRAN ODT) 4 MG ODT tab Take 1 tablet (4 mg) by mouth every 6 hours as needed for nausea or vomiting      !! QUEtiapine (SEROQUEL) 100 MG tablet Take 1 tablet (100 mg) by mouth 2 times daily as needed (psychosis or agitation)      !! QUEtiapine (SEROQUEL) 300 MG tablet Take 2 tablets (600 mg) by mouth At Bedtime       !! - Potential duplicate medications found. Please discuss with provider.      CONTINUE these medications which have NOT CHANGED    Details   cetirizine (ZYRTEC) 10 MG tablet Take 10 mg by mouth      fluticasone (FLONASE) 50 MCG/ACT nasal spray INSTILL 1 SPRAY IN EACH NOSTRIL TWICE A DAY      nicotine (NICORETTE) 4 MG gum Place 1 each (4 mg) inside cheek every hour as needed for smoking cessation  Qty: 110 each, Refills: 1    Comments: [facility] delivery, mint flavor  Associated Diagnoses: Nicotine dependence      omeprazole (PRILOSEC) 20 MG DR capsule Take 1 capsule by mouth daily      busPIRone (BUSPAR) 7.5 MG tablet Take 7.5 mg by mouth 3 times daily         STOP taking these medications       QUEtiapine ER (SEROQUEL XR) 300 MG 24 hr tablet Comments:   Reason for Stopping:                    Psychiatric Examination:   Appearance:  awake, alert and adequately groomed  Attitude:  somewhat cooperative  Eye Contact:  fair  Mood:  irritable, still some lability but improved from admission  Affect:  mood congruent  Speech:  clear, coherent  Psychomotor Behavior:  no evidence of tardive dyskinesia, dystonia, or tics  Thought Process:  illogical around treatment/medications, goal oriented  Associations:  no loose associations  Thought Content:  no evidence of suicidal ideation or homicidal ideation and denies AVH,  did not appear responding, does appear to still have some paranoia  Insight:  limited  Judgment:  limited  Oriented to:  time, person, and place  Attention Span and Concentration:  fair  Recent and Remote Memory:  fair  Language: English, fluent  Fund of Knowledge: appropriate  Muscle Strength and Tone: normal  Gait and Station: Normal         Discharge Plan:   Health Care Follow-up:      You are being transferred to the [facility] at:     [address] [place] [address]  [id number]  Visiting hours:  Weekdays: 4-8 p.m.  Weekends & Holidays: 2-8 p.m.     Attend all scheduled appointments with your outpatient providers. Call at least 24 hours in advance if you need to reschedule an appointment to ensure continued access to your outpatient providers.     Attestation:  Patient has been seen and evaluated by me, Katie Abrams DO on day of discharge. 40 minutes were spent in coordination of discharge planning.

## 2023-04-11 NOTE — PLAN OF CARE
Pt was escorted off the unit to [facility] to go [facility] in [place]. Pt verbalized readiness for discharge. Pt verbalized understanding of discharge plan including discharge medications and follow up appointments. Pt denies SI and SIB. Pt was medication compliant except refused nicotine patch. Ate breakfast. Irritable with writer. PT showered. Pt left with personal items. Pt left with medications from home and was given to [facility] and nurse was made aware of this from the [facility]. Report was given to nurse at [facility].     Goal Outcome Evaluation: discharged

## 2023-04-11 NOTE — PLAN OF CARE
Goal Outcome Evaluation:    Problem: Sleep Disturbance  Goal: Adequate Sleep/Rest  Outcome: Progressing     Pt appeared a sleep for 6.5 hours during the 15 minute safety checks. Plan for pt to discharge today at 2 pm. No safety concerns observed.

## 2023-04-11 NOTE — PLAN OF CARE
Assessment/Intervention/Current Symtoms and Care Coordination:  -Chart review  -Team meeting: nurse reports that Asha is doing well, taking medications, ready for discharge.    [facility] called and stated that they would  Asha at 11 am.      Current Symptoms include the following: Psychosis, disorganization, patient is irritable  Precautions: Elopement and Cheeking     Discharge Plan or Goal:  Asha will discharge today to [facility] in [facility].     Referral Status:  Referrals complete      Legal Status:  Patient is under an MICD Commitment in [facility]   Case No. [id number]     Contacts:  [facility]  - [facility]elli (phone: [phone], email: [email or web address])  yTesha Early - commitment  with [facility] ([phone])        Rationale for SIO/No Roommate Order:  Patient is not on SIO.  Patient has current roommate.

## 2024-01-16 NOTE — PLAN OF CARE
Addended by: CATERINA SUTHERLAND on: 1/16/2024 07:00 AM     Modules accepted: Level of Service     OCCUPATIONAL THERAPY GROUP NOTE:     03/31/23 1645   Group Therapy Session   Group Attendance attended group session   Time Session Began 1015   Time Session Ended 1145   Total Time (minutes) 90   Total # Attendees 6   Group Type task skill   Group Topic Covered structured socialization;leisure exploration/use of leisure time;problem-solving;coping skills/lifestyle management   Group Session Detail OT Clinic Group x2   Patient Response/Contribution cooperative with task;listened actively;organized;pleasant;engaged;congruent affect   Patient Participation Detail Occupational therapy clinic. Purpose of structured group: exploration/development of positive coping skills, engagement in creative expression and clinical observation of social, cognitive, and kinesthetic performance skills. Pt response: Pt actively participated in occupational therapy clinic. Chosen activity: working on a detailed stenciling canvas bag. Independent to initiate, gather materials, sequence and adjust to workspace demands as needed. Demonstrated excellent focus (60 min without interruption), planning, and attention to detail for this moderately complex task. Able to ask for assistance and socialized with peers and staff as needed, however she generally kept to herself and listened to her headphones while she worked. Affect appeared to brighten in interactions.

## 2024-10-01 NOTE — GROUP NOTE
"Group Therapy Documentation    PATIENT'S NAME: Asha Arrieta  MRN:   [id number]  :   1995  ACCT. NUMBER: [id number]  DATE OF SERVICE: 3/07/23  START TIME:  9:00 AM  END TIME: 11:00 AM  FACILITATOR(S): Lashanda aPz LADC  TOPIC: BEH Group Therapy  Number of patients attending the group:  10  Group Length:  2 Hours    Group Therapy Type: Recovery strategies    Summary of Group / Topics Discussed:    Recovery Principles    Patients completed daily check ins and the question of the day.     Patients presented assignments and shared feedback.    Patients engaged in reading and processing daily meditations.    Patients celebrated with a peer, the completion of the program.      Group Attendance:  Attended group session    Patient's response to the group topic/interactions:  did not discuss personal experience and did not share thoughts verbally    Patient appeared to be Passively Engaging     Client specific details:  Asha attended AM small group therapy. Patient struggled with engagement, this was evident by her getting up and going in and out of group several times. Patient had a hard time identifying a feeling, and stated, \" I am feeling alright, and okay.\" Writer attempted to help patient clarify and narrow towards a feeling, but patient was not able to beyond, feeling \"in aw\". Patient struggled with tracking discussion, and her response was not related to the check in question, and did not make sense in coordination with group. Patient did not offer up any feedback to peers who shared assignments.       " PAST MEDICAL HISTORY:  No pertinent past medical history

## 2024-10-31 NOTE — GROUP NOTE
Group Therapy Documentation    PATIENT'S NAME: Asha Arrieta  MRN:   [id number]  :   1995  ACCT. NUMBER: [id number]  DATE OF SERVICE: 3/05/23  START TIME:  8:45 AM  END TIME: 10:30 AM  FACILITATOR(S): Shoshana Sharma  TOPIC: BEH Group Therapy  Number of patients attending the group:  24    Group Length:  1.5 Hours    Group Therapy Type: Recovery strategies, Emotion processing, and Daily living/independence skills    Summary of Group / Topics Discussed:    Balanced lifestyle and Self-care activities      Group Attendance:  Attended group session    Patient's response to the group topic/interactions:  cooperative with task    Patient appeared to be Actively participating, Attentive and Engaged.        Client specific details:  Patient attended group lecture and was attentive and participative.      
Psychoeducation Group Documentation    PATIENT'S NAME: Asha Arrieta  MRN:   [id number]  :   1995  ACCT. NUMBER: [id number]  DATE OF SERVICE: 3/05/23  START TIME: 12:30 PM  END TIME:  1:30 PM  FACILITATOR(S): Jamel Romero LADC; Shoshana Sharma  TOPIC: BEH Pyschoeducation  Number of patients attending the group:  10  Group Length:  1 Hours    Skills Group Therapy Type: Recovery skills    Summary of Group / Topics Discussed:    Relapse prevention skills          Group Attendance:  Attended group session    Patient's response to the group topic/interactions:  cooperative with task    Patient appeared to be Attentive and Engaged.         Client specific details:  The patient participated in the afternoon lecture on recovery resources.        
Dr Batres